# Patient Record
Sex: FEMALE | Race: WHITE | NOT HISPANIC OR LATINO | Employment: OTHER | URBAN - METROPOLITAN AREA
[De-identification: names, ages, dates, MRNs, and addresses within clinical notes are randomized per-mention and may not be internally consistent; named-entity substitution may affect disease eponyms.]

---

## 2017-11-22 ENCOUNTER — OFFICE VISIT (OUTPATIENT)
Dept: URGENT CARE | Facility: CLINIC | Age: 77
End: 2017-11-22
Payer: MEDICARE

## 2017-11-22 VITALS
RESPIRATION RATE: 19 BRPM | HEIGHT: 61 IN | OXYGEN SATURATION: 96 % | WEIGHT: 124 LBS | HEART RATE: 79 BPM | TEMPERATURE: 98 F | SYSTOLIC BLOOD PRESSURE: 148 MMHG | DIASTOLIC BLOOD PRESSURE: 92 MMHG | BODY MASS INDEX: 23.41 KG/M2

## 2017-11-22 DIAGNOSIS — B02.9 HERPES ZOSTER WITHOUT COMPLICATION: Primary | ICD-10-CM

## 2017-11-22 PROCEDURE — 99213 OFFICE O/P EST LOW 20 MIN: CPT | Mod: S$GLB,,, | Performed by: FAMILY MEDICINE

## 2017-11-22 RX ORDER — VALACYCLOVIR HYDROCHLORIDE 1 G/1
1000 TABLET, FILM COATED ORAL 3 TIMES DAILY
Qty: 21 TABLET | Refills: 0 | Status: SHIPPED | OUTPATIENT
Start: 2017-11-22 | End: 2018-05-01

## 2017-11-22 RX ORDER — HYDROCODONE BITARTRATE AND ACETAMINOPHEN 5; 325 MG/1; MG/1
1 TABLET ORAL 3 TIMES DAILY PRN
Qty: 30 TABLET | Refills: 0 | Status: SHIPPED | OUTPATIENT
Start: 2017-11-22 | End: 2017-12-02

## 2017-11-23 NOTE — PROGRESS NOTES
"Subjective:       Patient ID: Gayathri Mack is a 77 y.o. female.    Vitals:  height is 5' 1" (1.549 m) and weight is 56.2 kg (124 lb). Her oral temperature is 97.5 °F (36.4 °C). Her blood pressure is 148/92 (abnormal) and her pulse is 79. Her respiration is 19 and oxygen saturation is 96%.     Chief Complaint: Rash (rash under her lt. breast that she noticed today)    Rash   This is a new problem. The current episode started today. The problem has been gradually worsening since onset. The affected locations include the chest. The rash is characterized by pain, redness, itchiness and burning. Associated symptoms include fatigue. Pertinent negatives include no fever, joint pain, shortness of breath or sore throat.     Review of Systems   Constitution: Positive for fatigue. Negative for chills and fever.   HENT: Negative for sore throat.    Respiratory: Negative for shortness of breath.    Skin: Positive for itching and rash.   Musculoskeletal: Negative for joint pain.       Objective:      Physical Exam   Constitutional: She appears well-developed and well-nourished. No distress.   HENT:   Right Ear: External ear normal.   Left Ear: External ear normal.   Mouth/Throat: Oropharynx is clear and moist. No oropharyngeal exudate.   Cardiovascular: Normal rate, regular rhythm and normal heart sounds.    Pulmonary/Chest: Effort normal and breath sounds normal. No respiratory distress. She has no wheezes.   Skin: Rash noted. Rash is vesicular.        Vitals reviewed.      Assessment:       1. Herpes zoster without complication        Plan:         Herpes zoster without complication    Other orders  -     valACYclovir (VALTREX) 1000 MG tablet; Take 1 tablet (1,000 mg total) by mouth 3 (three) times daily.  Dispense: 21 tablet; Refill: 0  -     hydrocodone-acetaminophen 5-325mg (NORCO) 5-325 mg per tablet; Take 1 tablet by mouth 3 (three) times daily as needed for Pain.  Dispense: 30 tablet; Refill: 0      "

## 2017-11-26 ENCOUNTER — TELEPHONE (OUTPATIENT)
Dept: URGENT CARE | Facility: CLINIC | Age: 77
End: 2017-11-26

## 2018-05-01 ENCOUNTER — OFFICE VISIT (OUTPATIENT)
Dept: URGENT CARE | Facility: CLINIC | Age: 78
End: 2018-05-01
Payer: MEDICARE

## 2018-05-01 VITALS
HEART RATE: 85 BPM | TEMPERATURE: 97 F | RESPIRATION RATE: 16 BRPM | BODY MASS INDEX: 23.41 KG/M2 | OXYGEN SATURATION: 97 % | DIASTOLIC BLOOD PRESSURE: 82 MMHG | WEIGHT: 124 LBS | SYSTOLIC BLOOD PRESSURE: 124 MMHG | HEIGHT: 61 IN

## 2018-05-01 DIAGNOSIS — J32.9 SINUSITIS, UNSPECIFIED CHRONICITY, UNSPECIFIED LOCATION: ICD-10-CM

## 2018-05-01 DIAGNOSIS — J06.9 UPPER RESPIRATORY TRACT INFECTION, UNSPECIFIED TYPE: Primary | ICD-10-CM

## 2018-05-01 PROCEDURE — 99201 PR OFFICE/OUTPT VISIT,NEW,LEVL I: CPT | Mod: S$GLB,,, | Performed by: NURSE PRACTITIONER

## 2018-05-01 RX ORDER — PREDNISONE 20 MG/1
TABLET ORAL
Qty: 10 TABLET | Refills: 0 | Status: SHIPPED | OUTPATIENT
Start: 2018-05-01 | End: 2020-09-20 | Stop reason: ALTCHOICE

## 2018-05-01 RX ORDER — PROMETHAZINE HYDROCHLORIDE 6.25 MG/5ML
6.25 SYRUP ORAL
Qty: 120 ML | Refills: 1 | Status: SHIPPED | OUTPATIENT
Start: 2018-05-01 | End: 2019-05-01

## 2018-05-01 RX ORDER — AZITHROMYCIN 250 MG/1
TABLET, FILM COATED ORAL
Qty: 6 TABLET | Refills: 0 | Status: SHIPPED | OUTPATIENT
Start: 2018-05-01 | End: 2020-09-20 | Stop reason: ALTCHOICE

## 2018-05-01 RX ORDER — ALBUTEROL SULFATE 90 UG/1
2 AEROSOL, METERED RESPIRATORY (INHALATION) EVERY 6 HOURS PRN
Qty: 8 G | Refills: 0 | Status: SHIPPED | OUTPATIENT
Start: 2018-05-01 | End: 2020-09-20

## 2018-05-01 NOTE — PROGRESS NOTES
"Subjective:       Patient ID: Gayathri Mack is a 77 y.o. female.    Vitals:  height is 5' 1" (1.549 m) and weight is 56.2 kg (124 lb). Her oral temperature is 97.1 °F (36.2 °C). Her blood pressure is 124/82 and her pulse is 85. Her respiration is 16 and oxygen saturation is 97%.     Chief Complaint: Cough    Started 3 days, has had sinus prob every 1-2 years.   States she has yellow productive cough    Going out of town Friday.   Smoker for years       Cough   This is a new problem. The current episode started in the past 7 days. The problem has been gradually worsening. The cough is productive of sputum. Associated symptoms include chills, headaches, nasal congestion and postnasal drip. Pertinent negatives include no chest pain, ear pain, eye redness, fever, myalgias, sore throat, shortness of breath or wheezing. Nothing aggravates the symptoms. She has tried nothing for the symptoms. The treatment provided no relief. There is no history of asthma, COPD or pneumonia.     Review of Systems   Constitution: Positive for chills. Negative for fever and malaise/fatigue.   HENT: Positive for postnasal drip. Negative for congestion, ear pain, hoarse voice and sore throat.    Eyes: Negative for discharge and redness.   Cardiovascular: Negative for chest pain, dyspnea on exertion and leg swelling.   Respiratory: Positive for cough and sputum production. Negative for shortness of breath and wheezing.    Musculoskeletal: Negative for myalgias.   Gastrointestinal: Negative for abdominal pain and nausea.   Neurological: Positive for headaches.       Objective:      Physical Exam   Constitutional: She is oriented to person, place, and time. She appears well-developed and well-nourished. She is cooperative.  Non-toxic appearance. She does not appear ill. No distress.   HENT:   Head: Normocephalic and atraumatic.   Right Ear: Tympanic membrane, external ear and ear canal normal.   Left Ear: Tympanic membrane, external ear and ear " canal normal.   Nose: No mucosal edema, rhinorrhea or nasal deformity. No epistaxis. Right sinus exhibits frontal sinus tenderness. Right sinus exhibits no maxillary sinus tenderness. Left sinus exhibits frontal sinus tenderness. Left sinus exhibits no maxillary sinus tenderness.   Mouth/Throat: Uvula is midline, oropharynx is clear and moist and mucous membranes are normal. Normal dentition. No uvula swelling. No posterior oropharyngeal erythema.   Eyes: Conjunctivae and lids are normal. No scleral icterus.   Sclera clear bilat   Neck: Trachea normal, full passive range of motion without pain and phonation normal. Neck supple.   Cardiovascular: Normal rate, regular rhythm, normal heart sounds, intact distal pulses and normal pulses.    Pulmonary/Chest: Effort normal and breath sounds normal. No respiratory distress.   Abdominal: Normal appearance.   Musculoskeletal: Normal range of motion. She exhibits no edema or deformity.   Neurological: She is alert and oriented to person, place, and time. She exhibits normal muscle tone. Coordination normal.   Skin: Skin is warm, dry and intact. She is not diaphoretic. No pallor.   Psychiatric: She has a normal mood and affect. Her speech is normal and behavior is normal. Judgment and thought content normal. Cognition and memory are normal.   Nursing note and vitals reviewed.      Assessment:       1. Upper respiratory tract infection, unspecified type    2. Sinusitis, unspecified chronicity, unspecified location        Plan:         Upper respiratory tract infection, unspecified type    Sinusitis, unspecified chronicity, unspecified location    Other orders  -     albuterol 90 mcg/actuation inhaler; Inhale 2 puffs into the lungs every 6 (six) hours as needed for Wheezing. Rescue  Dispense: 8 g; Refill: 0  -     predniSONE (DELTASONE) 20 MG tablet; Take 40 mg for 2 days, Take 30 mg for 2 days, Take 20 mg for 2 days, Take 10 mg for 2 days  Dispense: 10 tablet; Refill: 0  -      azithromycin (ZITHROMAX Z-XENA) 250 MG tablet; Take 2 tablets (500 mg) on  Day 1,  followed by 1 tablet (250 mg) once daily on Days 2 through 5.  Dispense: 6 tablet; Refill: 0  -     promethazine (PHENERGAN) 6.25 mg/5 mL syrup; Take 5 mLs (6.25 mg total) by mouth every 4 to 6 hours as needed for Nausea (cough).  Dispense: 120 mL; Refill: 1    discussed home relief

## 2018-05-01 NOTE — PATIENT INSTRUCTIONS
Symptomatic treatment: (consider the following unless you are allergic or cannot take the following suggestions)  Alternate Tylenol and Ibuprofen every 3 hrs for pain and fever control   For a sore throat try salt water gargles, honey/lemon water to soothe throat  Cepachol helps to numb the discomfort in throat  Cold-eeze helps to reduce the duration of Upper Respiratory Infection symptoms  Elderberry to reduce duration of URI symptoms  Nasal saline spray reduces inflammation and dryness  Warm face compresses/hot showers as often as you can to open sinuses   Vicks vapor rub at night  Flonase OTC or Nasacort OTC (nasal steroid)  Stay hydrated with increased water intake and simple foods like chicken noodle soup help hydrate and soothe the throat  Drink pedialyte, gatorade or propel.   Delsym helps with coughing at night  Zyrtec or Claritin during the day   Benadryl at night may help if allergy component   You can try breathe right strips at night to help you breathe   A cool mist humidifier in bedroom may help with cough and relieve stuffy nose.   Zantac will help if there is reflux from the post nasal drip         If you experience any:  Chest pain, shortness of breath, wheezing or difficulty breathing  Severe headache, face, neck or ear pain  New rash  Fever over 101.5º F (38.6 C) for more than three days  Confusion, behavior change or seizure  Severe weakness or dizziness  Go to ER

## 2018-05-04 ENCOUNTER — TELEPHONE (OUTPATIENT)
Dept: URGENT CARE | Facility: CLINIC | Age: 78
End: 2018-05-04

## 2018-12-20 ENCOUNTER — OFFICE VISIT (OUTPATIENT)
Dept: URGENT CARE | Facility: CLINIC | Age: 78
End: 2018-12-20
Payer: MEDICARE

## 2018-12-20 VITALS
SYSTOLIC BLOOD PRESSURE: 129 MMHG | TEMPERATURE: 98 F | OXYGEN SATURATION: 96 % | BODY MASS INDEX: 23.41 KG/M2 | DIASTOLIC BLOOD PRESSURE: 90 MMHG | HEART RATE: 103 BPM | WEIGHT: 124 LBS | HEIGHT: 61 IN

## 2018-12-20 DIAGNOSIS — J06.9 ACUTE URI: Primary | ICD-10-CM

## 2018-12-20 PROCEDURE — 99213 OFFICE O/P EST LOW 20 MIN: CPT | Mod: S$GLB,,, | Performed by: FAMILY MEDICINE

## 2018-12-20 RX ORDER — AZITHROMYCIN 250 MG/1
250 TABLET, FILM COATED ORAL DAILY
Qty: 6 TABLET | Refills: 0 | Status: SHIPPED | OUTPATIENT
Start: 2018-12-20 | End: 2018-12-26

## 2018-12-20 NOTE — PATIENT INSTRUCTIONS
Please return here or go to the Emergency Department for any concerns or worsening of condition.  If you were prescribed antibiotics, please take them to completion.  If you were prescribed a narcotic medication, do not drive or operate heavy equipment or machinery while taking these medications.  Please follow up with your primary care doctor or specialist as needed.  If you  smoke, please stop smoking.  Symptomatic treatment:    Tylenol every 4 hours  Ibuprofen ever 6 hours  salt water gargles  Cepachol helps to numb the discomfort  Chloroseptic spray  Nasal saline spray reduces inflammation and dryness  Warm face compresses as often as you can  Vicks vapor rub at night  Flonase OTC or Nasacort OTC  Simple foods like chicken noodle soup help  Delsym helps with coughing at night  Zyrtec/Claritin during the day   Rest as much as you can    Mucinex DM for cough

## 2018-12-20 NOTE — PROGRESS NOTES
"Subjective:       Patient ID: Gayathri Mack is a 78 y.o. female.    Vitals:  height is 5' 1" (1.549 m) and weight is 56.2 kg (124 lb). Her oral temperature is 98.2 °F (36.8 °C). Her blood pressure is 129/90 (abnormal) and her pulse is 103. Her oxygen saturation is 96%.     Chief Complaint: URI    x5 days, No OTC medications today.      URI    This is a new problem. The current episode started in the past 7 days. The problem has been gradually worsening. There has been no fever. Associated symptoms include congestion, coughing, sneezing and wheezing. Pertinent negatives include no ear pain, nausea, rash, sinus pain, sore throat or vomiting. She has tried nothing for the symptoms. The treatment provided no relief.       Constitution: Positive for chills. Negative for sweating, fatigue and fever.   HENT: Positive for congestion. Negative for ear pain, sinus pain, sinus pressure, sore throat and voice change.    Neck: Negative for painful lymph nodes.   Eyes: Negative for eye redness.   Respiratory: Positive for cough, sputum production and wheezing. Negative for chest tightness, bloody sputum, COPD, shortness of breath, stridor and asthma.    Gastrointestinal: Negative for nausea and vomiting.   Musculoskeletal: Negative for muscle ache.   Skin: Negative for rash.   Allergic/Immunologic: Positive for sneezing. Negative for seasonal allergies and asthma.   Hematologic/Lymphatic: Negative for swollen lymph nodes.       Objective:      Physical Exam   Constitutional: She appears well-developed and well-nourished.   HENT:   Head: Normocephalic and atraumatic.   Right Ear: External ear normal.   Left Ear: External ear normal.   Nose: Nose normal.   Mouth/Throat: Oropharynx is clear and moist. No oropharyngeal exudate.   Eyes: Conjunctivae are normal. Right eye exhibits no discharge. Left eye exhibits no discharge.   Cardiovascular: Normal rate, regular rhythm and normal heart sounds.   Pulmonary/Chest: Effort normal and " breath sounds normal. She has no wheezes.   Skin: Skin is warm and dry.   Psychiatric: She has a normal mood and affect. Her behavior is normal.   Vitals reviewed.      Assessment:       1. Acute URI        Plan:         Acute URI    Other orders  -     azithromycin (ZITHROMAX Z-XENA) 250 MG tablet; Take 1 tablet (250 mg total) by mouth once daily. TWO TABS DAY ONE, ONE TAB DAYS TWO-FIVE for 6 days  Dispense: 6 tablet; Refill: 0    mucinex dm for cough

## 2018-12-23 ENCOUNTER — TELEPHONE (OUTPATIENT)
Dept: URGENT CARE | Facility: CLINIC | Age: 78
End: 2018-12-23

## 2019-04-25 ENCOUNTER — OFFICE VISIT (OUTPATIENT)
Dept: URGENT CARE | Facility: CLINIC | Age: 79
End: 2019-04-25
Payer: MEDICARE

## 2019-04-25 VITALS
OXYGEN SATURATION: 96 % | BODY MASS INDEX: 23.41 KG/M2 | HEART RATE: 88 BPM | SYSTOLIC BLOOD PRESSURE: 150 MMHG | TEMPERATURE: 97 F | DIASTOLIC BLOOD PRESSURE: 91 MMHG | WEIGHT: 124 LBS | RESPIRATION RATE: 16 BRPM | HEIGHT: 61 IN

## 2019-04-25 DIAGNOSIS — Z20.89 EXPOSURE TO PNEUMONIA: Primary | ICD-10-CM

## 2019-04-25 DIAGNOSIS — R06.2 WHEEZING: ICD-10-CM

## 2019-04-25 PROCEDURE — 99214 PR OFFICE/OUTPT VISIT, EST, LEVL IV, 30-39 MIN: ICD-10-PCS | Mod: S$GLB,,, | Performed by: FAMILY MEDICINE

## 2019-04-25 PROCEDURE — 99214 OFFICE O/P EST MOD 30 MIN: CPT | Mod: S$GLB,,, | Performed by: FAMILY MEDICINE

## 2019-04-25 RX ORDER — PREDNISONE 20 MG/1
20 TABLET ORAL DAILY
Qty: 5 TABLET | Refills: 0 | Status: SHIPPED | OUTPATIENT
Start: 2019-04-25 | End: 2019-04-30

## 2019-04-25 RX ORDER — ALBUTEROL SULFATE 90 UG/1
2 AEROSOL, METERED RESPIRATORY (INHALATION) EVERY 6 HOURS PRN
Qty: 1 INHALER | Refills: 2 | Status: SHIPPED | OUTPATIENT
Start: 2019-04-25 | End: 2020-09-20

## 2019-04-25 RX ORDER — AZITHROMYCIN 250 MG/1
TABLET, FILM COATED ORAL
Qty: 6 TABLET | Refills: 0 | Status: SHIPPED | OUTPATIENT
Start: 2019-04-25 | End: 2020-09-20 | Stop reason: ALTCHOICE

## 2019-04-25 RX ORDER — BENZONATATE 100 MG/1
100 CAPSULE ORAL EVERY 6 HOURS PRN
Qty: 30 CAPSULE | Refills: 1 | Status: SHIPPED | OUTPATIENT
Start: 2019-04-25 | End: 2020-04-24

## 2019-04-25 NOTE — PROGRESS NOTES
"Subjective:       Patient ID: Gayathri Mack is a 78 y.o. female.    Vitals:  height is 5' 1" (1.549 m) and weight is 56.2 kg (124 lb). Her oral temperature is 97.1 °F (36.2 °C). Her blood pressure is 150/91 (abnormal) and her pulse is 88. Her respiration is 16 and oxygen saturation is 96%.     Chief Complaint: Cough    Pt states she started with sore throat, but it is resolved now, then she started with cough, headaches on Tuesday. Pt also states she was expose to  pneumonia last Friday from daughter.     Cough   This is a new problem. The current episode started in the past 7 days. The problem has been gradually worsening. The problem occurs constantly. Associated symptoms include headaches, nasal congestion and wheezing. Pertinent negatives include no chills, ear pain, eye redness, fever, hemoptysis, myalgias, rash, sore throat or shortness of breath. Nothing aggravates the symptoms. Treatments tried: nyquil capsules. The treatment provided no relief.       Constitution: Positive for fatigue. Negative for chills, sweating and fever.   HENT: Positive for congestion and sinus pressure. Negative for ear pain, sinus pain, sore throat and voice change.    Neck: Negative for painful lymph nodes.   Eyes: Negative for eye redness.   Respiratory: Positive for chest tightness, cough and wheezing. Negative for sputum production, bloody sputum, COPD, shortness of breath, stridor and asthma.    Gastrointestinal: Negative for nausea and vomiting.   Musculoskeletal: Negative for muscle ache.   Skin: Negative for rash.   Allergic/Immunologic: Negative for seasonal allergies and asthma.   Neurological: Positive for headaches.   Hematologic/Lymphatic: Negative for swollen lymph nodes.       Objective:      Physical Exam   Constitutional: She is oriented to person, place, and time. She appears well-developed and well-nourished. She is cooperative.  Non-toxic appearance. She appears ill. No distress.   HENT:   Head: Normocephalic and " atraumatic.   Right Ear: Hearing, tympanic membrane, external ear and ear canal normal.   Left Ear: Hearing, tympanic membrane, external ear and ear canal normal.   Nose: Mucosal edema present. No rhinorrhea or nasal deformity. No epistaxis. Right sinus exhibits no maxillary sinus tenderness and no frontal sinus tenderness. Left sinus exhibits no maxillary sinus tenderness and no frontal sinus tenderness.   Mouth/Throat: Uvula is midline, oropharynx is clear and moist and mucous membranes are normal. No trismus in the jaw. Normal dentition. No uvula swelling. No posterior oropharyngeal erythema.   Eyes: Conjunctivae and lids are normal. No scleral icterus.   Sclera clear bilat   Neck: Trachea normal, full passive range of motion without pain and phonation normal. Neck supple.   Cardiovascular: Normal rate, regular rhythm, normal heart sounds, intact distal pulses and normal pulses.   Pulmonary/Chest: Effort normal. No respiratory distress. She has wheezes.   Abdominal: Soft. Normal appearance and bowel sounds are normal. She exhibits no distension. There is no tenderness.   Musculoskeletal: Normal range of motion. She exhibits no edema or deformity.   Neurological: She is alert and oriented to person, place, and time. She exhibits normal muscle tone. Coordination normal.   Skin: Skin is warm, dry and intact. She is not diaphoretic. No pallor.   Psychiatric: She has a normal mood and affect. Her speech is normal and behavior is normal. Judgment and thought content normal. Cognition and memory are normal.   Nursing note and vitals reviewed.      Assessment:       1. Exposure to pneumonia    2. Wheezing        Plan:         Exposure to pneumonia    Wheezing    Other orders  -     azithromycin (ZITHROMAX) 250 MG tablet; Take 2 pills on day one, then one pill each day until completed  Dispense: 6 tablet; Refill: 0  -     albuterol (PROVENTIL/VENTOLIN HFA) 90 mcg/actuation inhaler; Inhale 2 puffs into the lungs every 6  (six) hours as needed for Wheezing. Rescue  Dispense: 1 Inhaler; Refill: 2  -     benzonatate (TESSALON PERLES) 100 MG capsule; Take 1 capsule (100 mg total) by mouth every 6 (six) hours as needed for Cough.  Dispense: 30 capsule; Refill: 1  -     predniSONE (DELTASONE) 20 MG tablet; Take 1 tablet (20 mg total) by mouth once daily. for 5 days  Dispense: 5 tablet; Refill: 0        Pocket script given to patient in case symptoms fail to improve or worsen. Pt advised on risk of taking medication too soon and verbalized understanding.    Explained r/b and patient v/u to fill only if symptoms progress or worsen after maximizing home remedies sheet given and/or explained during encounter.

## 2019-04-28 ENCOUNTER — TELEPHONE (OUTPATIENT)
Dept: URGENT CARE | Facility: CLINIC | Age: 79
End: 2019-04-28

## 2020-09-20 ENCOUNTER — OFFICE VISIT (OUTPATIENT)
Dept: URGENT CARE | Facility: CLINIC | Age: 80
End: 2020-09-20
Payer: MEDICARE

## 2020-09-20 VITALS
TEMPERATURE: 98 F | HEIGHT: 61 IN | HEART RATE: 70 BPM | DIASTOLIC BLOOD PRESSURE: 87 MMHG | WEIGHT: 123.88 LBS | BODY MASS INDEX: 23.39 KG/M2 | OXYGEN SATURATION: 94 % | SYSTOLIC BLOOD PRESSURE: 140 MMHG | RESPIRATION RATE: 18 BRPM

## 2020-09-20 DIAGNOSIS — Z87.09 HISTORY OF COPD: ICD-10-CM

## 2020-09-20 DIAGNOSIS — J06.9 ACUTE URI: ICD-10-CM

## 2020-09-20 DIAGNOSIS — R05.9 COUGH: Primary | ICD-10-CM

## 2020-09-20 DIAGNOSIS — R06.2 WHEEZING: ICD-10-CM

## 2020-09-20 LAB
CTP QC/QA: YES
SARS-COV-2 RDRP RESP QL NAA+PROBE: NEGATIVE

## 2020-09-20 PROCEDURE — 71046 X-RAY EXAM CHEST 2 VIEWS: CPT | Mod: S$GLB,,, | Performed by: RADIOLOGY

## 2020-09-20 PROCEDURE — 99214 PR OFFICE/OUTPT VISIT, EST, LEVL IV, 30-39 MIN: ICD-10-PCS | Mod: S$GLB,,, | Performed by: PHYSICIAN ASSISTANT

## 2020-09-20 PROCEDURE — 71046 XR CHEST PA AND LATERAL: ICD-10-PCS | Mod: S$GLB,,, | Performed by: RADIOLOGY

## 2020-09-20 PROCEDURE — U0002: ICD-10-PCS | Mod: QW,S$GLB,, | Performed by: PHYSICIAN ASSISTANT

## 2020-09-20 PROCEDURE — U0002 COVID-19 LAB TEST NON-CDC: HCPCS | Mod: QW,S$GLB,, | Performed by: PHYSICIAN ASSISTANT

## 2020-09-20 PROCEDURE — 99214 OFFICE O/P EST MOD 30 MIN: CPT | Mod: S$GLB,,, | Performed by: PHYSICIAN ASSISTANT

## 2020-09-20 RX ORDER — LISINOPRIL 20 MG/1
TABLET ORAL
COMMUNITY
Start: 2020-06-26 | End: 2021-06-14 | Stop reason: SDUPTHER

## 2020-09-20 RX ORDER — ALBUTEROL SULFATE 90 UG/1
2 AEROSOL, METERED RESPIRATORY (INHALATION) EVERY 6 HOURS PRN
Qty: 18 G | Refills: 3 | Status: SHIPPED | OUTPATIENT
Start: 2020-09-20 | End: 2021-02-08

## 2020-09-20 RX ORDER — PREDNISONE 10 MG/1
TABLET ORAL
Qty: 18 TABLET | Refills: 0 | Status: SHIPPED | OUTPATIENT
Start: 2020-09-20 | End: 2021-06-14

## 2020-09-20 RX ORDER — IPRATROPIUM BROMIDE AND ALBUTEROL SULFATE 2.5; .5 MG/3ML; MG/3ML
3 SOLUTION RESPIRATORY (INHALATION) EVERY 6 HOURS PRN
Qty: 25 VIAL | Refills: 2 | Status: SHIPPED | OUTPATIENT
Start: 2020-09-20 | End: 2020-12-15 | Stop reason: SDUPTHER

## 2020-09-20 RX ORDER — ALENDRONATE SODIUM 70 MG/1
70 TABLET ORAL
COMMUNITY
Start: 2020-06-15 | End: 2021-06-14 | Stop reason: SDUPTHER

## 2020-09-20 RX ORDER — CITALOPRAM 20 MG/1
20 TABLET, FILM COATED ORAL DAILY
COMMUNITY
End: 2021-06-14

## 2020-09-20 NOTE — PROGRESS NOTES
"Subjective:       Patient ID: Gayathri Mack is a 80 y.o. female.    Vitals:  height is 5' 1" (1.549 m) and weight is 56.2 kg (123 lb 14.4 oz). Her temperature is 97.5 °F (36.4 °C). Her blood pressure is 140/87 (abnormal) and her pulse is 70. Her respiration is 18 and oxygen saturation is 94% (abnormal).     Chief Complaint: COVID-19 Concerns    Pt presents to the clinic for COVID testing. Pt c/o fatigue, wheezing and dry cough, x 4 weeks. Pt is taking breathing treatments 2x day.     Cough  This is a new problem. The current episode started 1 to 4 weeks ago. The problem has been unchanged. The cough is non-productive (sometimes mucus). Associated symptoms include shortness of breath and wheezing. Pertinent negatives include no chills, ear pain, eye redness, fever, headaches, hemoptysis, myalgias, nasal congestion, postnasal drip, rash or sore throat. Nothing aggravates the symptoms. She has tried ipratropium inhaler for the symptoms. The treatment provided no relief.       Constitution: Negative for chills, sweating, fatigue and fever.   HENT: Positive for sinus pressure. Negative for ear pain, congestion, postnasal drip, sinus pain, sore throat and voice change.    Neck: Negative for painful lymph nodes.   Eyes: Negative for eye redness.   Respiratory: Positive for cough, shortness of breath and wheezing. Negative for chest tightness, sputum production, bloody sputum, COPD, stridor and asthma.    Gastrointestinal: Negative for nausea, vomiting and diarrhea.   Musculoskeletal: Negative for muscle ache.   Skin: Negative for rash.   Allergic/Immunologic: Negative for seasonal allergies and asthma.   Neurological: Negative for headaches.   Hematologic/Lymphatic: Negative for swollen lymph nodes.       Objective:      Physical Exam   Constitutional: She is oriented to person, place, and time. She appears well-developed. She is cooperative.  Non-toxic appearance. She does not appear ill. No distress.   HENT:   Head: " Normocephalic and atraumatic.   Ears:   Right Ear: Hearing, tympanic membrane, external ear and ear canal normal.   Left Ear: Hearing, tympanic membrane, external ear and ear canal normal.   Nose: Nose normal. No mucosal edema, rhinorrhea or nasal deformity. No epistaxis. Right sinus exhibits no maxillary sinus tenderness and no frontal sinus tenderness. Left sinus exhibits no maxillary sinus tenderness and no frontal sinus tenderness.   Mouth/Throat: Uvula is midline, oropharynx is clear and moist and mucous membranes are normal. No trismus in the jaw. Normal dentition. No uvula swelling. No oropharyngeal exudate, posterior oropharyngeal edema or posterior oropharyngeal erythema.   Eyes: Conjunctivae and lids are normal. No scleral icterus.   Neck: Trachea normal, full passive range of motion without pain and phonation normal. Neck supple. No neck rigidity. No edema and no erythema present.   Cardiovascular: Normal rate, regular rhythm, normal heart sounds and normal pulses.   Pulmonary/Chest: Effort normal. No respiratory distress. She has no decreased breath sounds. She has wheezes in the right upper field, the right middle field, the left upper field and the left middle field. She has no rhonchi.   Abdominal: Normal appearance.   Musculoskeletal: Normal range of motion.         General: No deformity.   Neurological: She is alert and oriented to person, place, and time. She exhibits normal muscle tone. Coordination normal.   Skin: Skin is warm, dry, intact, not diaphoretic and not pale. Psychiatric: Her speech is normal and behavior is normal. Judgment and thought content normal.   Nursing note and vitals reviewed.        Assessment:       1. Cough    2. Wheezing    3. Acute URI    4. History of COPD        Plan:         Cough  -     POCT COVID-19 Rapid Screening  -     XR CHEST PA AND LATERAL; Future; Expected date: 09/20/2020  -     albuterol-ipratropium (DUO-NEB) 2.5 mg-0.5 mg/3 mL nebulizer solution; Take 3  mLs by nebulization every 6 (six) hours as needed for Wheezing. Rescue  Dispense: 25 vial; Refill: 2  -     predniSONE (DELTASONE) 10 MG tablet; Take two pills po x 5 days, 1 pill po x 5 days, 1/2 pill po until empty. Start 24 hours after injection.  Dispense: 18 tablet; Refill: 0  -     albuterol (PROVENTIL/VENTOLIN HFA) 90 mcg/actuation inhaler; Inhale 2 puffs into the lungs every 6 (six) hours as needed for Wheezing. Rescue  Dispense: 18 g; Refill: 3    Wheezing  -     XR CHEST PA AND LATERAL; Future; Expected date: 09/20/2020  -     albuterol-ipratropium (DUO-NEB) 2.5 mg-0.5 mg/3 mL nebulizer solution; Take 3 mLs by nebulization every 6 (six) hours as needed for Wheezing. Rescue  Dispense: 25 vial; Refill: 2  -     predniSONE (DELTASONE) 10 MG tablet; Take two pills po x 5 days, 1 pill po x 5 days, 1/2 pill po until empty. Start 24 hours after injection.  Dispense: 18 tablet; Refill: 0  -     albuterol (PROVENTIL/VENTOLIN HFA) 90 mcg/actuation inhaler; Inhale 2 puffs into the lungs every 6 (six) hours as needed for Wheezing. Rescue  Dispense: 18 g; Refill: 3    Acute URI  -     XR CHEST PA AND LATERAL; Future; Expected date: 09/20/2020  -     albuterol-ipratropium (DUO-NEB) 2.5 mg-0.5 mg/3 mL nebulizer solution; Take 3 mLs by nebulization every 6 (six) hours as needed for Wheezing. Rescue  Dispense: 25 vial; Refill: 2  -     predniSONE (DELTASONE) 10 MG tablet; Take two pills po x 5 days, 1 pill po x 5 days, 1/2 pill po until empty. Start 24 hours after injection.  Dispense: 18 tablet; Refill: 0  -     albuterol (PROVENTIL/VENTOLIN HFA) 90 mcg/actuation inhaler; Inhale 2 puffs into the lungs every 6 (six) hours as needed for Wheezing. Rescue  Dispense: 18 g; Refill: 3    History of COPD  -     XR CHEST PA AND LATERAL; Future; Expected date: 09/20/2020  -     albuterol-ipratropium (DUO-NEB) 2.5 mg-0.5 mg/3 mL nebulizer solution; Take 3 mLs by nebulization every 6 (six) hours as needed for Wheezing. Rescue   Dispense: 25 vial; Refill: 2  -     predniSONE (DELTASONE) 10 MG tablet; Take two pills po x 5 days, 1 pill po x 5 days, 1/2 pill po until empty. Start 24 hours after injection.  Dispense: 18 tablet; Refill: 0  -     albuterol (PROVENTIL/VENTOLIN HFA) 90 mcg/actuation inhaler; Inhale 2 puffs into the lungs every 6 (six) hours as needed for Wheezing. Rescue  Dispense: 18 g; Refill: 3    All applicable EKG, medical records, labs, imaging reviewed in Epic and discussed with patient.   Xr Chest Pa And Lateral    Result Date: 9/20/2020  EXAMINATION: XR CHEST PA AND LATERAL CLINICAL HISTORY: Cough TECHNIQUE: PA and lateral views of the chest were performed. COMPARISON: None FINDINGS: The lungs are clear, with normal appearance of pulmonary vasculature and no pleural effusion or pneumothorax. The cardiac silhouette is normal in size. The hilar and mediastinal contours are unremarkable. Bones are intact.     No acute abnormality. Electronically signed by: Jumana Galvan MD Date:    09/20/2020 Time:    14:23   Results for orders placed or performed in visit on 09/20/20   POCT COVID-19 Rapid Screening   Result Value Ref Range    POC Rapid COVID Negative Negative     Acceptable Yes       Patient Instructions       COPD Flare    You have had a flare-up of your COPD.  COPD, or chronic obstructive pulmonary disease, is a common lung disease. It causes your airways to become irritated and narrower. This makes it harder for you to breathe. Emphysema and chronic bronchitis are both types of COPD. This is a chronic condition, which means you always have it. Sometimes it gets worse. When this happens, it is called a flare-up.  Symptoms of COPD  People with COPD may have symptoms most of the time. In a flare-up, your symptoms get worse. These symptoms may mean you are having a flare-up:  · Shortness of breath, shallow or rapid breathing, or wheezing that gets worse  · Lung infection  · Cough that gets worse  · More  mucus, thicker mucus or mucus of a different color  · Tiredness, decreased energy, or trouble doing your usual activities  · Fever  · Chest tightness  · Your symptoms dont get better even when you use your usual medicines, inhalers, and nebulizer  · Trouble talking  · You feel confused  Causes of flare-ups  Unfortunately, a flare-up can happen even though you did everything right, and you followed your doctors instructions. Some causes of flare-ups are:  · Smoking or secondhand smoke  · Colds, the flu, or respiratory infections  · Air pollution  · Sudden change in the weather  · Dust, irritating chemicals, or strong fumes  · Not taking your medicines as prescribed  Home care  Here are some things you can do at home to treat a flare-up:  · Try not to panic. This makes it harder to breathe, and keeps you from doing the right things.  · Dont smoke or be around others who are smoking.  · Try to drink more fluids than usual during a flare-up, unless your doctor has told you not to because of heart and kidney problems. More fluids can help loosen the mucus.  · Use your inhalers and nebulizer, if you have one, as you have been told to.  · If you were given antibiotics, take them until they are used up or your doctor tells you to stop. Its important to finish the antibiotics, even though you feel better. This will make sure the infection has cleared.  · If you were given prednisone or another steroid, finish it even if you feel better.  Preventing a flare-up  Even though flare-ups happen, the best way to treat one is to prevent it before it starts. Here are some pointers:  · Dont smoke or be around others who are smoking.  · Take your medicines as you have been told.  · Talk with your doctor about getting a flu shot every year. Also find out if you need a pneumonia shot.  · If there is a weather advisory warning to stay indoors, try to stay inside when possible.  · Try to eat healthy and get plenty of sleep.  · Try to  avoid things that usually set you off, like dust, chemical fumes, hairsprays, or strong perfumes.  Follow-up care  Follow up with your healthcare provider, or as advised.  If a culture was done, you will be told if your treatment needs to be changed. You can call as directed for the results.  If X-rays were done, you will be notified of any new findings that may affect your care.  Call 911  Call 911 if any of these occur:  · You have trouble breathing  · You feel confused or its difficult to wake you up  · You faint or lose consciousness  · You have a rapid heart rate  · You have new pain in your chest, arm, shoulder, neck or upper back  When to seek medical advice  Call your healthcare provider right away if any of these occur:  · Wheezing or shortness of breath gets worse  · You need to use your inhalers more often than usual without relief  · Fever of 100.4°F (38ºC) or higher, or as directed by your healthcare provider  · Coughing up lots of dark-colored or bloody mucus (sputum)  · Chest pain with each breath  · You do not start to get better within 24 hours  · Swelling of your ankles gets worse  · Dizziness or weakness  Date Last Reviewed: 9/1/2016  © 1357-1702 [employer] [address] [place] [address], [place] [address]. All rights reserved. This information is not intended as a substitute for professional medical care. Always follow your healthcare professional's instructions.      Using a Nebulizer (Adult)    A nebulizer turns medicine into a mist. You breathe the mist in through a mask or a mouthpiece. To use your nebulizer, follow the steps below.  With a mask  · Put the correct dose of medicine in the cup. Attach the top as directed.  · Connect one end of the tubing to the cup and the other end to the nebulizer.  · Attach the mask to the cup.  · Place the mask over your nose and mouth. Make sure it fits securely and comfortably.  · Turn on the nebulizer.  · Take slow, deep breaths, keeping the  nebulizer upright, until all the medicine is gone. This takes 10-15 minutes.  Be sure to follow directions you are given for cleaning the nebulizer and the mask.   With a mouthpiece    · Put the correct dose of medicine in the cup. Attach the top as directed.  · Connect one end of the tubing to the cup and the other end to the nebulizer.  · Attach the mouthpiece to the cup.  · Put the mouthpiece between your teeth and close your lips around it. Keep your tongue below the mouthpiece.  · Turn on the nebulizer.  · Take slow, deep breaths through the mouthpiece, keeping the nebulizer upright, until all the medicine is gone. This takes 10-15 minutes.  Be sure to follow directions you are given for cleaning the nebulizer and the mouthpiece.   Date Last Reviewed: 10/1/2016  © 5591-3540 [employer] [address] [place] [address]. All rights reserved. This information is not intended as a substitute for professional medical care. Always follow your healthcare professional's instructions.    Thank you for enrolling in [facility]. Please follow the instructions below to securely access your online medical record. [facility] allows you to send messages to your doctor, view your test results, renew your prescriptions, schedule appointments, and more.     How Do I Sign Up?  1. In your Internet browser, go to [email or web address].  2. In the lower right of the page, click the Activate Now link located under the Have Access Code? Title.  3. Enter your [facility] Access Code exactly as it appears below. You will not need to use this code after youve completed the sign-up process. If you do not sign up before the expiration date, you must request a new code.  [facility] Access Code: Activation code not generated  Current Patient Portal Status: Account disabled    4. Enter Date of Birth (mm/dd/yyyy) as indicated and click the Next button. You will be taken to the next sign-up page.  5. Create a [facility] ID. This will  be your new [facility] login ID and cannot be changed, so think of one that is secure and easy to remember.  6. Create a [facility] password.  Your password must be at least 8 characters long and contain at least 1 letter and 1 number.  You can change your password at any time.  7. Enter your Password Reset Question and Answer, then click the Next button.   8. Enter your e-mail address. You will receive e-mail notification when new information is available in [facility].  9. Click Sign Up. You can now view your medical record.     Additional Information  If you have questions, you can email [email or web address] or call [phone]  to talk to our [facility] staff. Remember, [facility] is NOT to be used for urgent needs. For medical emergencies, dial 911.     If not allergic,take tylenol (acetominophen) for fever control, chills, or body aches every 4 hours. Do not exceed 4000 mg/ day.If not allergic, take Motrin (Ibuprofen) every 4 hours for fever, chills, pain or inflammation. Do not exceed 2400 mg/day. You can alternate taking tylenol and motrin.    If you were prescribed a narcotic medication, do not drive or operate heavy equipment or machinery while taking these medications.  You must understand that you've received an Urgent Care treatment only and that you may be released before all your medical problems are known or treated. You, the patient, will arrange for follow up care as instructed.  Follow up with your PCP or specialty clinic as directed in the next 1-2 weeks if not improved or as needed.  You can call [phone] to schedule an appointment with the appropriate provider.  If your condition worsens we recommend that you receive another evaluation at the emergency room immediately or contact your primary medical clinics after hours call service to discuss your concerns.    Please return here or go to the Emergency Department for any concerns or worsening of condition.    If you have been referred to  another provider and wish to call to check on the status of your referral, please call [facility] at [phone]

## 2020-09-20 NOTE — PATIENT INSTRUCTIONS
COPD Flare    You have had a flare-up of your COPD.  COPD, or chronic obstructive pulmonary disease, is a common lung disease. It causes your airways to become irritated and narrower. This makes it harder for you to breathe. Emphysema and chronic bronchitis are both types of COPD. This is a chronic condition, which means you always have it. Sometimes it gets worse. When this happens, it is called a flare-up.  Symptoms of COPD  People with COPD may have symptoms most of the time. In a flare-up, your symptoms get worse. These symptoms may mean you are having a flare-up:  · Shortness of breath, shallow or rapid breathing, or wheezing that gets worse  · Lung infection  · Cough that gets worse  · More mucus, thicker mucus or mucus of a different color  · Tiredness, decreased energy, or trouble doing your usual activities  · Fever  · Chest tightness  · Your symptoms dont get better even when you use your usual medicines, inhalers, and nebulizer  · Trouble talking  · You feel confused  Causes of flare-ups  Unfortunately, a flare-up can happen even though you did everything right, and you followed your doctors instructions. Some causes of flare-ups are:  · Smoking or secondhand smoke  · Colds, the flu, or respiratory infections  · Air pollution  · Sudden change in the weather  · Dust, irritating chemicals, or strong fumes  · Not taking your medicines as prescribed  Home care  Here are some things you can do at home to treat a flare-up:  · Try not to panic. This makes it harder to breathe, and keeps you from doing the right things.  · Dont smoke or be around others who are smoking.  · Try to drink more fluids than usual during a flare-up, unless your doctor has told you not to because of heart and kidney problems. More fluids can help loosen the mucus.  · Use your inhalers and nebulizer, if you have one, as you have been told to.  · If you were given antibiotics, take them until they are used up or your doctor tells you  to stop. Its important to finish the antibiotics, even though you feel better. This will make sure the infection has cleared.  · If you were given prednisone or another steroid, finish it even if you feel better.  Preventing a flare-up  Even though flare-ups happen, the best way to treat one is to prevent it before it starts. Here are some pointers:  · Dont smoke or be around others who are smoking.  · Take your medicines as you have been told.  · Talk with your doctor about getting a flu shot every year. Also find out if you need a pneumonia shot.  · If there is a weather advisory warning to stay indoors, try to stay inside when possible.  · Try to eat healthy and get plenty of sleep.  · Try to avoid things that usually set you off, like dust, chemical fumes, hairsprays, or strong perfumes.  Follow-up care  Follow up with your healthcare provider, or as advised.  If a culture was done, you will be told if your treatment needs to be changed. You can call as directed for the results.  If X-rays were done, you will be notified of any new findings that may affect your care.  Call 911  Call 911 if any of these occur:  · You have trouble breathing  · You feel confused or its difficult to wake you up  · You faint or lose consciousness  · You have a rapid heart rate  · You have new pain in your chest, arm, shoulder, neck or upper back  When to seek medical advice  Call your healthcare provider right away if any of these occur:  · Wheezing or shortness of breath gets worse  · You need to use your inhalers more often than usual without relief  · Fever of 100.4°F (38ºC) or higher, or as directed by your healthcare provider  · Coughing up lots of dark-colored or bloody mucus (sputum)  · Chest pain with each breath  · You do not start to get better within 24 hours  · Swelling of your ankles gets worse  · Dizziness or weakness  Date Last Reviewed: 9/1/2016  © 5845-1210 [employer]. [address],  [place] [address]. All rights reserved. This information is not intended as a substitute for professional medical care. Always follow your healthcare professional's instructions.      Using a Nebulizer (Adult)    A nebulizer turns medicine into a mist. You breathe the mist in through a mask or a mouthpiece. To use your nebulizer, follow the steps below.  With a mask  · Put the correct dose of medicine in the cup. Attach the top as directed.  · Connect one end of the tubing to the cup and the other end to the nebulizer.  · Attach the mask to the cup.  · Place the mask over your nose and mouth. Make sure it fits securely and comfortably.  · Turn on the nebulizer.  · Take slow, deep breaths, keeping the nebulizer upright, until all the medicine is gone. This takes 10-15 minutes.  Be sure to follow directions you are given for cleaning the nebulizer and the mask.   With a mouthpiece    · Put the correct dose of medicine in the cup. Attach the top as directed.  · Connect one end of the tubing to the cup and the other end to the nebulizer.  · Attach the mouthpiece to the cup.  · Put the mouthpiece between your teeth and close your lips around it. Keep your tongue below the mouthpiece.  · Turn on the nebulizer.  · Take slow, deep breaths through the mouthpiece, keeping the nebulizer upright, until all the medicine is gone. This takes 10-15 minutes.  Be sure to follow directions you are given for cleaning the nebulizer and the mouthpiece.   Date Last Reviewed: 10/1/2016  © 0744-4405 [employer]. [address] [place] [address], [place] [address]. All rights reserved. This information is not intended as a substitute for professional medical care. Always follow your healthcare professional's instructions.    Thank you for enrolling in [facility]. Please follow the instructions below to securely access your online medical record. My allows you to send messages to your doctor, view your test results, renew your  prescriptions, schedule appointments, and more.     How Do I Sign Up?  1. In your Internet browser, go to [email or web address].  2. In the lower right of the page, click the Activate Now link located under the Have Access Code? Title.  3. Enter your [facility] Access Code exactly as it appears below. You will not need to use this code after youve completed the sign-up process. If you do not sign up before the expiration date, you must request a new code.  [facility] Access Code: Activation code not generated  Current Patient Portal Status: Account disabled    4. Enter Date of Birth (mm/dd/yyyy) as indicated and click the Next button. You will be taken to the next sign-up page.  5. Create a [facility] ID. This will be your new [facility] login ID and cannot be changed, so think of one that is secure and easy to remember.  6. Create a [facility] password.  Your password must be at least 8 characters long and contain at least 1 letter and 1 number.  You can change your password at any time.  7. Enter your Password Reset Question and Answer, then click the Next button.   8. Enter your e-mail address. You will receive e-mail notification when new information is available in [facility].  9. Click Sign Up. You can now view your medical record.     Additional Information  If you have questions, you can email [email or web address] or call [phone]  to talk to our [facility] staff. Remember, [facility] is NOT to be used for urgent needs. For medical emergencies, dial 911.     If not allergic,take tylenol (acetominophen) for fever control, chills, or body aches every 4 hours. Do not exceed 4000 mg/ day.If not allergic, take Motrin (Ibuprofen) every 4 hours for fever, chills, pain or inflammation. Do not exceed 2400 mg/day. You can alternate taking tylenol and motrin.    If you were prescribed a narcotic medication, do not drive or operate heavy equipment or machinery while taking these medications.  You must understand that  you've received an Urgent Care treatment only and that you may be released before all your medical problems are known or treated. You, the patient, will arrange for follow up care as instructed.  Follow up with your PCP or specialty clinic as directed in the next 1-2 weeks if not improved or as needed.  You can call [phone] to schedule an appointment with the appropriate provider.  If your condition worsens we recommend that you receive another evaluation at the emergency room immediately or contact your primary medical clinics after hours call service to discuss your concerns.    Please return here or go to the Emergency Department for any concerns or worsening of condition.    If you have been referred to another provider and wish to call to check on the status of your referral, please call [facility] Sched[facility] at [phone]

## 2020-10-30 ENCOUNTER — OFFICE VISIT (OUTPATIENT)
Dept: URGENT CARE | Facility: CLINIC | Age: 80
End: 2020-10-30
Payer: MEDICARE

## 2020-10-30 VITALS
TEMPERATURE: 97 F | RESPIRATION RATE: 18 BRPM | OXYGEN SATURATION: 97 % | HEIGHT: 61 IN | DIASTOLIC BLOOD PRESSURE: 79 MMHG | HEART RATE: 105 BPM | BODY MASS INDEX: 23.22 KG/M2 | WEIGHT: 123 LBS | SYSTOLIC BLOOD PRESSURE: 106 MMHG

## 2020-10-30 DIAGNOSIS — R05.9 COUGH: ICD-10-CM

## 2020-10-30 DIAGNOSIS — B96.89 ACUTE BACTERIAL BRONCHITIS: Primary | ICD-10-CM

## 2020-10-30 DIAGNOSIS — J20.8 ACUTE BACTERIAL BRONCHITIS: Primary | ICD-10-CM

## 2020-10-30 DIAGNOSIS — J98.01 BRONCHOSPASM: ICD-10-CM

## 2020-10-30 LAB
CTP QC/QA: YES
SARS-COV-2 RDRP RESP QL NAA+PROBE: NEGATIVE

## 2020-10-30 PROCEDURE — 99214 OFFICE O/P EST MOD 30 MIN: CPT | Mod: S$GLB,,, | Performed by: PHYSICIAN ASSISTANT

## 2020-10-30 PROCEDURE — U0002: ICD-10-PCS | Mod: QW,S$GLB,, | Performed by: PHYSICIAN ASSISTANT

## 2020-10-30 PROCEDURE — 99214 PR OFFICE/OUTPT VISIT, EST, LEVL IV, 30-39 MIN: ICD-10-PCS | Mod: S$GLB,,, | Performed by: PHYSICIAN ASSISTANT

## 2020-10-30 PROCEDURE — U0002 COVID-19 LAB TEST NON-CDC: HCPCS | Mod: QW,S$GLB,, | Performed by: PHYSICIAN ASSISTANT

## 2020-10-30 RX ORDER — PREDNISONE 20 MG/1
20 TABLET ORAL DAILY
Qty: 5 TABLET | Refills: 0 | Status: SHIPPED | OUTPATIENT
Start: 2020-10-30 | End: 2020-11-04

## 2020-10-30 RX ORDER — IPRATROPIUM BROMIDE 0.5 MG/2.5ML
500 SOLUTION RESPIRATORY (INHALATION) 4 TIMES DAILY
Qty: 3 BOX | Refills: 0 | Status: SHIPPED | OUTPATIENT
Start: 2020-10-30 | End: 2021-06-14

## 2020-10-30 RX ORDER — BENZONATATE 100 MG/1
200 CAPSULE ORAL 3 TIMES DAILY PRN
Qty: 20 CAPSULE | Refills: 0 | Status: SHIPPED | OUTPATIENT
Start: 2020-10-30 | End: 2022-02-09

## 2020-10-30 RX ORDER — AMOXICILLIN AND CLAVULANATE POTASSIUM 875; 125 MG/1; MG/1
1 TABLET, FILM COATED ORAL 2 TIMES DAILY
Qty: 20 TABLET | Refills: 0 | Status: SHIPPED | OUTPATIENT
Start: 2020-10-30 | End: 2020-11-09

## 2020-10-30 RX ORDER — ALBUTEROL SULFATE 1.25 MG/3ML
1.25 SOLUTION RESPIRATORY (INHALATION) EVERY 6 HOURS PRN
Qty: 3 BOX | Refills: 0 | Status: SHIPPED | OUTPATIENT
Start: 2020-10-30 | End: 2020-12-08 | Stop reason: ALTCHOICE

## 2020-10-30 RX ORDER — AZELASTINE 1 MG/ML
1 SPRAY, METERED NASAL 2 TIMES DAILY
Qty: 30 ML | Refills: 0 | Status: SHIPPED | OUTPATIENT
Start: 2020-10-30 | End: 2023-10-24

## 2020-10-30 NOTE — PROGRESS NOTES
"Subjective:       Patient ID: Gayathri Mack is a 80 y.o. female.    Vitals:  height is 5' 1" (1.549 m) and weight is 55.8 kg (123 lb). Her temperature is 97.1 °F (36.2 °C). Her blood pressure is 106/79 and her pulse is 105. Her respiration is 18 and oxygen saturation is 97%.     Chief Complaint: Wheezing    Patient presents to clinic with complaints of wheezing, coughing x 2 months on/off. She complains of a tickle in her throat that causes her to cough. Afebrile. Denies chest pain, SOB, GI upset, or abdominal pain. Denies any sick contacts that she is aware of. She has been using her Duo-Neb nebulizer machine 2-3 x daily. She lives with in [place] and currently spends her time between [facility] and [place]. She is unsure if there is an allergen that is contributing to her symptoms. She believes she may have undiagnosed COPD.     Wheezing   This is a recurrent problem. The current episode started more than 1 month ago. The problem occurs intermittently. The problem has been unchanged. Associated symptoms include coughing. Pertinent negatives include no abdominal pain, chest pain, chills, coryza, diarrhea, ear pain, fever, headaches, hemoptysis, neck pain, rash, rhinorrhea, shortness of breath, sore throat, sputum production, swollen glands or vomiting. Nothing aggravates the symptoms. She has tried ipratropium inhalers and OTC cough suppressant for the symptoms. The treatment provided mild relief.       Constitution: Negative for chills, fatigue and fever.   HENT: Negative for ear pain, congestion and sore throat.    Neck: Negative for neck pain and painful lymph nodes.   Cardiovascular: Negative for chest pain and leg swelling.   Eyes: Negative for double vision and blurred vision.   Respiratory: Positive for cough and wheezing. Negative for sputum production, bloody sputum and shortness of breath.    Gastrointestinal: Negative for abdominal pain, nausea, vomiting and diarrhea.   Genitourinary: Negative for dysuria, " frequency, urgency and history of kidney stones.   Musculoskeletal: Negative for joint pain, joint swelling, muscle cramps and muscle ache.   Skin: Negative for color change, pale, rash and bruising.   Allergic/Immunologic: Negative for seasonal allergies.   Neurological: Negative for dizziness, history of vertigo, light-headedness, passing out and headaches.   Hematologic/Lymphatic: Negative for swollen lymph nodes.   Psychiatric/Behavioral: Negative for nervous/anxious, sleep disturbance and depression. The patient is not nervous/anxious.        Objective:      Physical Exam   Constitutional: She is oriented to person, place, and time. She appears well-developed. She is cooperative.  Non-toxic appearance. She does not appear ill. No distress.   HENT:   Head: Normocephalic and atraumatic.   Ears:   Right Ear: Hearing, tympanic membrane, external ear and ear canal normal. Tympanic membrane is not erythematous and not bulging. impacted cerumen  Left Ear: Hearing, tympanic membrane, external ear and ear canal normal. Tympanic membrane is not erythematous and not bulging. impacted cerumen  Nose: No mucosal edema, rhinorrhea, nasal deformity or congestion. No epistaxis. Right sinus exhibits maxillary sinus tenderness. Right sinus exhibits no frontal sinus tenderness. Left sinus exhibits maxillary sinus tenderness. Left sinus exhibits no frontal sinus tenderness.   Mouth/Throat: Uvula is midline, oropharynx is clear and moist and mucous membranes are normal. No trismus in the jaw. Normal dentition. No uvula swelling. No oropharyngeal exudate, posterior oropharyngeal edema, posterior oropharyngeal erythema, tonsillar abscesses or cobblestoning. No tonsillar exudate.   Eyes: Conjunctivae and lids are normal. Right eye exhibits no discharge. Left eye exhibits no discharge. No scleral icterus.   Neck: Trachea normal, full passive range of motion without pain and phonation normal. Neck supple. No neck rigidity. No edema and  no erythema present.   Cardiovascular: Normal rate, regular rhythm, normal heart sounds and normal pulses. Exam reveals no gallop and no friction rub.   No murmur heard.  Pulmonary/Chest: Effort normal. No accessory muscle usage or stridor. No tachypnea. No respiratory distress. She has no decreased breath sounds. She has wheezes (mild in bilateral middle and lower lobes with prolonged expiration) in the right middle field, the right lower field, the left middle field and the left lower field. She has no rhonchi.   Abdominal: Normal appearance.   Musculoskeletal: Normal range of motion.         General: No deformity.   Lymphadenopathy:        Head (right side): No submandibular and no tonsillar adenopathy present.        Head (left side): No submandibular and no tonsillar adenopathy present.     She has no cervical adenopathy.        Right cervical: No superficial cervical and no posterior cervical adenopathy present.       Left cervical: No superficial cervical and no posterior cervical adenopathy present.   Neurological: She is alert and oriented to person, place, and time. She exhibits normal muscle tone. Coordination normal.   Skin: Skin is warm, dry, intact, not diaphoretic and not pale. Psychiatric: Her speech is normal and behavior is normal. Judgment and thought content normal.   Nursing note and vitals reviewed.        Results for orders placed or performed in visit on 10/30/20   POCT COVID-19 Rapid Screening   Result Value Ref Range    POC Rapid COVID Negative Negative     Acceptable Yes        Assessment:       1. Acute bacterial bronchitis    2. Cough    3. Bronchospasm        Plan:       Covid testing negative at this time and reviewed results with patient. Refills called in for nebulizer solution. Based on exam findings will cover with Augmentin for bacterial bronchitis based on length of symptoms and maxillary sinus TTP. No chest XR available today in clinic. Discussed to return to   in 1 week should symptoms persist. Further evaluation warranted due to chronicity of symptoms and discussed that a pulmonology referral will be placed today. Patient verbalized understanding and all of her questions were answered.     Acute bacterial bronchitis  -     amoxicillin-clavulanate 875-125mg (AUGMENTIN) 875-125 mg per tablet; Take 1 tablet by mouth 2 (two) times daily. for 10 days  Dispense: 20 tablet; Refill: 0  -     azelastine (ASTELIN) 137 mcg (0.1 %) nasal spray; 1 spray (137 mcg total) by Nasal route 2 (two) times daily.  Dispense: 30 mL; Refill: 0    Cough  -     POCT COVID-19 Rapid Screening  -     benzonatate (TESSALON PERLES) 100 MG capsule; Take 2 capsules (200 mg total) by mouth 3 (three) times daily as needed for Cough.  Dispense: 20 capsule; Refill: 0    Bronchospasm  -     predniSONE (DELTASONE) 20 MG tablet; Take 1 tablet (20 mg total) by mouth once daily. for 5 days  Dispense: 5 tablet; Refill: 0  -     albuterol (ACCUNEB) 1.25 mg/3 mL Nebu; Take 3 mLs (1.25 mg total) by nebulization every 6 (six) hours as needed. Rescue  Dispense: 3 Box; Refill: 0  -     ipratropium (ATROVENT) 0.02 % nebulizer solution; Take 2.5 mLs (500 mcg total) by nebulization 4 (four) times daily. Rescue  Dispense: 3 Box; Refill: 0  -     Ambulatory referral/consult to Pulmonology      Patient Instructions     Bronchitis with Wheezing (Viral or Bacterial: Adult)    Bronchitis is an infection of the air passages. It often occurs during a cold and is usually caused by a virus. Symptoms include cough with mucus (phlegm) and low-grade fever. This illness is contagious during the first few days and is spread through the air by coughing and sneezing, or by direct contact (touching the sick person and then touching your own eyes, nose, or mouth).  If there is a lot of inflammation, air flow is restricted. The air passages may also go into spasm, especially if you have asthma. This causes wheezing and difficulty breathing  even in people who do not have asthma.  Bronchitis usually lasts 7 to 14 days. The wheezing should improve with treatment during the first week. An inhaler is often prescribed to relax the air passages and stop wheezing. Antibiotics will be prescribed if your doctor thinks there is also a secondary bacterial infection.  Home care  · If symptoms are severe, rest at home for the first 2 to 3 days. When you go back to your usual activities, don't let yourself get too tired.  · Do not smoke. Also avoid being exposed to secondhand smoke.  · You may use over-the-counter medicine to control fever or pain, unless another medicine was prescribed. Note: If you have chronic liver or kidney disease or have ever had a stomach ulcer or gastrointestinal bleeding, talk with your healthcare provider before using these medicines. Also talk to your provider if you are taking medicine to prevent blood clots.) Aspirin should never be given to anyone younger than 18 years of age who is ill with a viral infection or fever. It may cause severe liver or brain damage.  · Your appetite may be poor, so a light diet is fine. Avoid dehydration by drinking 6 to 8 glasses of fluids per day (such as water, soft drinks, sports drinks, juices, tea, or soup). Extra fluids will help loosen secretions in the nose and lungs.  · Over-the-counter cough, cold, and sore-throat medicines will not shorten the length of the illness, but they may be helpful to reduce symptoms. (Note: Do not use decongestants if you have high blood pressure.)  · If you were given an inhaler, use it exactly as directed. If you need to use it more often than prescribed, your condition may be worsening. If this happens, contact your healthcare provider.  · If prescribed, finish all antibiotic medicine, even if you are feeling better after only a few days.  Follow-up care  Follow up with your healthcare provider, or as advised. If you had an X-ray or ECG (electrocardiogram), a  specialist will review it. You will be notified of any new findings that may affect your care.  Note: If you are age 65 or older, or if you have a chronic lung disease or condition that affects your immune system, or you smoke, talk to your healthcare provider about having a pneumococcal vaccinations and a yearly influenza vaccination (flu shot).  When to seek medical advice  Call your healthcare provider right away if any of these occur:  · Fever of 100.4°F (38°C) or higher  · Coughing up increasing amounts of colored sputum  · Weakness, drowsiness, headache, facial pain, ear pain, or a stiff neck  Call 911, or get immediate medical care  Contact emergency services right away if any of these occur.  · Coughing up blood  · Worsening weakness, drowsiness, headache, or stiff neck  · Increased wheezing not helped with medication, shortness of breath, or pain with breathing  Date Last Reviewed: 9/13/2015  © 8788-4171 [employer] [address] [place] [address]. All rights reserved. This information is not intended as a substitute for professional medical care. Always follow your healthcare professional's instructions.        Bronchospasm (Adult)    Bronchospasm occurs when the airways (bronchial tubes) go into spasm and contract. This makes it hard to breathe and causes wheezing (a high-pitched whistling sound). Bronchospasm can also cause frequent coughing without wheezing.  Bronchospasm is due to irritation, inflammation, or allergic reaction of the airways. People with asthma get bronchospasm. However, not everyone with bronchospasm has asthma.  Being exposed to harmful fumes, a recent case of bronchitis, exercise, or a flare-up of chronic obstructive pulmonary disease (COPD) may cause the airways to spasm. An episode of bronchospasm may last 7 to 14 days. Medicine may be prescribed to relax the airways and prevent wheezing. Antibiotics will be prescribed only if your healthcare provider  thinks there is a bacterial infection. Antibiotics do not help a viral infection.  Home care  · Drink lots of water or other fluids (at least 10 glasses a day) during an attack. This will loosen lung secretions and make it easier to breathe. If you have heart or kidney disease, check with your doctor before you drink extra fluids.  · Take prescribed medicine exactly at the times advised. If you take an inhaled medicine to help with breathing, do not use it more than once every 4 hours, unless told to do so. If prescribed an antibiotic or prednisone, take all of the medicine, even if you are feeling better after a few days.  · Do not smoke. Also avoid being exposed to secondhand smoke.  · If you were given an inhaler, use it exactly as directed. If you need to use it more often than prescribed, your condition may be getting worse. Contact your healthcare provider.  Follow-up care  Follow up with your healthcare provider, or as advised.  Note: If you are age 65 or older, have a chronic lung disease or condition that affects your immune system, or you smoke, we recommend getting pneumococcal vaccinations, as well as an influenza vaccination (flu shot) every autumn. Ask your healthcare provider about this.  When to seek medical advice  Call your healthcare provider right away if any of these occur:  · You need to use your inhalers more often than usual.  · You develop a fever of 100.4°F (38°C) or higher.  · You are coughing up lots of dark-colored sputum (mucus).  · You do not start to improve within 24 hours.  Call 911, or get immediate medical care  Contact emergency services if any of these occur:  · Coughing up bloody sputum (mucus)  · Chest pain with each breath  · Increased wheezing or shortness of breath  Date Last Reviewed: 9/13/2015  © 7914-0506 [employer]. [address] [place] [address], [place] [address]. All rights reserved. This information is not intended as a substitute for professional medical  care. Always follow your healthcare professional's instructions.      Be sure to use your nebulizer every 6-8 hours as needed for any cough, wheeze or shortness of breath.  Make sure you have your rescue inhaler with you and you may use your inhaler as needed every 4-6 hours. Take 2 puffs for any cough, wheeze or shortness of breath.      A referral to [facility] has been placed for you.  Please call [phone] to make an appt    Please follow up with your Primary care provider within 2-5 days if your signs and symptoms have not resolved or worsen.     If your condition worsens or fails to improve we recommend that you receive another evaluation at the emergency room immediately or contact your primary medical clinic to discuss your concerns.   You must understand that you have received an Urgent Care treatment only and that you may be released before all of your medical problems are known or treated. You, the patient, will arrange for follow up care as instructed.     RED FLAGS/WARNING SYMPTOMS DISCUSSED WITH PATIENT THAT WOULD WARRANT EMERGENT MEDICAL ATTENTION. PATIENT VERBALIZED UNDERSTANDING.

## 2020-10-30 NOTE — PATIENT INSTRUCTIONS
Bronchitis with Wheezing (Viral or Bacterial: Adult)    Bronchitis is an infection of the air passages. It often occurs during a cold and is usually caused by a virus. Symptoms include cough with mucus (phlegm) and low-grade fever. This illness is contagious during the first few days and is spread through the air by coughing and sneezing, or by direct contact (touching the sick person and then touching your own eyes, nose, or mouth).  If there is a lot of inflammation, air flow is restricted. The air passages may also go into spasm, especially if you have asthma. This causes wheezing and difficulty breathing even in people who do not have asthma.  Bronchitis usually lasts 7 to 14 days. The wheezing should improve with treatment during the first week. An inhaler is often prescribed to relax the air passages and stop wheezing. Antibiotics will be prescribed if your doctor thinks there is also a secondary bacterial infection.  Home care  · If symptoms are severe, rest at home for the first 2 to 3 days. When you go back to your usual activities, don't let yourself get too tired.  · Do not smoke. Also avoid being exposed to secondhand smoke.  · You may use over-the-counter medicine to control fever or pain, unless another medicine was prescribed. Note: If you have chronic liver or kidney disease or have ever had a stomach ulcer or gastrointestinal bleeding, talk with your healthcare provider before using these medicines. Also talk to your provider if you are taking medicine to prevent blood clots.) Aspirin should never be given to anyone younger than 18 years of age who is ill with a viral infection or fever. It may cause severe liver or brain damage.  · Your appetite may be poor, so a light diet is fine. Avoid dehydration by drinking 6 to 8 glasses of fluids per day (such as water, soft drinks, sports drinks, juices, tea, or soup). Extra fluids will help loosen secretions in the nose and lungs.  · Over-the-counter  cough, cold, and sore-throat medicines will not shorten the length of the illness, but they may be helpful to reduce symptoms. (Note: Do not use decongestants if you have high blood pressure.)  · If you were given an inhaler, use it exactly as directed. If you need to use it more often than prescribed, your condition may be worsening. If this happens, contact your healthcare provider.  · If prescribed, finish all antibiotic medicine, even if you are feeling better after only a few days.  Follow-up care  Follow up with your healthcare provider, or as advised. If you had an X-ray or ECG (electrocardiogram), a specialist will review it. You will be notified of any new findings that may affect your care.  Note: If you are age 65 or older, or if you have a chronic lung disease or condition that affects your immune system, or you smoke, talk to your healthcare provider about having a pneumococcal vaccinations and a yearly influenza vaccination (flu shot).  When to seek medical advice  Call your healthcare provider right away if any of these occur:  · Fever of 100.4°F (38°C) or higher  · Coughing up increasing amounts of colored sputum  · Weakness, drowsiness, headache, facial pain, ear pain, or a stiff neck  Call 911, or get immediate medical care  Contact emergency services right away if any of these occur.  · Coughing up blood  · Worsening weakness, drowsiness, headache, or stiff neck  · Increased wheezing not helped with medication, shortness of breath, or pain with breathing  Date Last Reviewed: 9/13/2015  © 5459-1764 [employer]. [address] [place] [address], [place] [address]. All rights reserved. This information is not intended as a substitute for professional medical care. Always follow your healthcare professional's instructions.        Bronchospasm (Adult)    Bronchospasm occurs when the airways (bronchial tubes) go into spasm and contract. This makes it hard to breathe and causes wheezing (a  high-pitched whistling sound). Bronchospasm can also cause frequent coughing without wheezing.  Bronchospasm is due to irritation, inflammation, or allergic reaction of the airways. People with asthma get bronchospasm. However, not everyone with bronchospasm has asthma.  Being exposed to harmful fumes, a recent case of bronchitis, exercise, or a flare-up of chronic obstructive pulmonary disease (COPD) may cause the airways to spasm. An episode of bronchospasm may last 7 to 14 days. Medicine may be prescribed to relax the airways and prevent wheezing. Antibiotics will be prescribed only if your healthcare provider thinks there is a bacterial infection. Antibiotics do not help a viral infection.  Home care  · Drink lots of water or other fluids (at least 10 glasses a day) during an attack. This will loosen lung secretions and make it easier to breathe. If you have heart or kidney disease, check with your doctor before you drink extra fluids.  · Take prescribed medicine exactly at the times advised. If you take an inhaled medicine to help with breathing, do not use it more than once every 4 hours, unless told to do so. If prescribed an antibiotic or prednisone, take all of the medicine, even if you are feeling better after a few days.  · Do not smoke. Also avoid being exposed to secondhand smoke.  · If you were given an inhaler, use it exactly as directed. If you need to use it more often than prescribed, your condition may be getting worse. Contact your healthcare provider.  Follow-up care  Follow up with your healthcare provider, or as advised.  Note: If you are age 65 or older, have a chronic lung disease or condition that affects your immune system, or you smoke, we recommend getting pneumococcal vaccinations, as well as an influenza vaccination (flu shot) every autumn. Ask your healthcare provider about this.  When to seek medical advice  Call your healthcare provider right away if any of these occur:  · You need  to use your inhalers more often than usual.  · You develop a fever of 100.4°F (38°C) or higher.  · You are coughing up lots of dark-colored sputum (mucus).  · You do not start to improve within 24 hours.  Call 911, or get immediate medical care  Contact emergency services if any of these occur:  · Coughing up bloody sputum (mucus)  · Chest pain with each breath  · Increased wheezing or shortness of breath  Date Last Reviewed: 9/13/2015 © 2000-2017 [employer]. [address] [place] [address]. All rights reserved. This information is not intended as a substitute for professional medical care. Always follow your healthcare professional's instructions.      Be sure to use your nebulizer every 6-8 hours as needed for any cough, wheeze or shortness of breath.  Make sure you have your rescue inhaler with you and you may use your inhaler as needed every 4-6 hours. Take 2 puffs for any cough, wheeze or shortness of breath.      A referral to [facility] has been placed for you.  Please call [phone] to make an appt    Please follow up with your Primary care provider within 2-5 days if your signs and symptoms have not resolved or worsen.     If your condition worsens or fails to improve we recommend that you receive another evaluation at the emergency room immediately or contact your primary medical clinic to discuss your concerns.   You must understand that you have received an Urgent Care treatment only and that you may be released before all of your medical problems are known or treated. You, the patient, will arrange for follow up care as instructed.     RED FLAGS/WARNING SYMPTOMS DISCUSSED WITH PATIENT THAT WOULD WARRANT EMERGENT MEDICAL ATTENTION. PATIENT VERBALIZED UNDERSTANDING.

## 2020-12-08 ENCOUNTER — OFFICE VISIT (OUTPATIENT)
Dept: PULMONOLOGY | Facility: CLINIC | Age: 80
End: 2020-12-08
Payer: MEDICARE

## 2020-12-08 ENCOUNTER — TELEPHONE (OUTPATIENT)
Dept: PULMONOLOGY | Facility: CLINIC | Age: 80
End: 2020-12-08

## 2020-12-08 VITALS
HEIGHT: 61 IN | WEIGHT: 147.69 LBS | SYSTOLIC BLOOD PRESSURE: 110 MMHG | DIASTOLIC BLOOD PRESSURE: 82 MMHG | HEART RATE: 98 BPM | BODY MASS INDEX: 27.88 KG/M2 | OXYGEN SATURATION: 96 %

## 2020-12-08 DIAGNOSIS — J44.9 CHRONIC OBSTRUCTIVE PULMONARY DISEASE, UNSPECIFIED COPD TYPE: Primary | ICD-10-CM

## 2020-12-08 DIAGNOSIS — R05.9 COUGH: ICD-10-CM

## 2020-12-08 DIAGNOSIS — J44.1 CHRONIC OBSTRUCTIVE PULMONARY DISEASE WITH ACUTE EXACERBATION: ICD-10-CM

## 2020-12-08 PROCEDURE — 99214 OFFICE O/P EST MOD 30 MIN: CPT | Mod: S$PBB,ICN,, | Performed by: NURSE PRACTITIONER

## 2020-12-08 PROCEDURE — 99999 PR PBB SHADOW E&M-EST. PATIENT-LVL III: ICD-10-PCS | Mod: PBBFAC,,, | Performed by: NURSE PRACTITIONER

## 2020-12-08 PROCEDURE — 99999 PR PBB SHADOW E&M-EST. PATIENT-LVL III: CPT | Mod: PBBFAC,,, | Performed by: NURSE PRACTITIONER

## 2020-12-08 PROCEDURE — 99213 OFFICE O/P EST LOW 20 MIN: CPT | Mod: PBBFAC,PO | Performed by: NURSE PRACTITIONER

## 2020-12-08 PROCEDURE — 99214 PR OFFICE/OUTPT VISIT, EST, LEVL IV, 30-39 MIN: ICD-10-PCS | Mod: S$PBB,ICN,, | Performed by: NURSE PRACTITIONER

## 2020-12-08 RX ORDER — UMECLIDINIUM 62.5 UG/1
62.5 AEROSOL, POWDER ORAL DAILY
Qty: 1 EACH | Refills: 2 | Status: SHIPPED | OUTPATIENT
Start: 2020-12-08 | End: 2021-01-12

## 2020-12-08 RX ORDER — MONTELUKAST SODIUM 10 MG/1
10 TABLET ORAL NIGHTLY
Qty: 90 TABLET | Refills: 3 | Status: SHIPPED | OUTPATIENT
Start: 2020-12-08 | End: 2021-12-20 | Stop reason: SDUPTHER

## 2020-12-08 RX ORDER — PREDNISONE 20 MG/1
TABLET ORAL
Qty: 18 TABLET | Refills: 1 | Status: SHIPPED | OUTPATIENT
Start: 2020-12-08 | End: 2021-06-14

## 2020-12-08 RX ORDER — GUAIFENESIN 600 MG/1
1200 TABLET, EXTENDED RELEASE ORAL 2 TIMES DAILY
Qty: 60 TABLET | Refills: 2 | Status: SHIPPED | OUTPATIENT
Start: 2020-12-08 | End: 2021-06-14

## 2020-12-08 NOTE — PROGRESS NOTES
12/8/2020    Gayathri Mack  New Patient Consult    Chief Complaint   Patient presents with    COPD     new patient    Cough     worsen     Wheezing       HPI: 12/8/2020- onset 3 months, seen at [facility] for bronchitis, tx with albuterol rescue inhaler, oral steroids, nebulizer budesonide, and antibiotics.  First occurences in lifetime  In clinic with daughter, complaint of SOB- onset 1 year, worse with exertion, improves with rest, worse with cold weather, associated with sinus complaints, associated with chest tightness and wheeze.   Cough- onset 3 months, associated with post nasal drip, worse in early morning and late evening, improves with sipping water and cough drops, productive in mornings clear nickel size, having coughing fits  Fatigue- sleeps when she does not want to. Takes daily naps. States having morning headaches, no mental cloudiness.   Social Hx: lives with daughter and pet dog, Retired , possible mold exposure from work site, no known Asbestosis exposure, Smoking Hx: quit 1 month, 60 pack years.   Family Hx: Mother Lung Cancer, Sister COPD, no Asthma, no MADELAINE  Medical Hx: no previous pneumonia ; no previous shoulder/chest surgery        The chief compliant  problem is new to me  PFSH:  Past Medical History:   Diagnosis Date    Cancer          No past surgical history on file.  Social History     Tobacco Use    Smoking status: Light Tobacco Smoker     Packs/day: 0.50     Types: Cigarettes    Smokeless tobacco: Never Used   Substance Use Topics    Alcohol use: No    Drug use: Not on file     No family history on file.  Review of patient's allergies indicates:  No Known Allergies  I have reviewed past medical, family, and social history. I have reviewed previous nurse notes.    Performance Status:The patient's activity level is functions out of house.          Review of Systems   Constitutional: Negative for activity change, appetite change, chills, diaphoresis, fatigue,  "fever and unexpected weight change.   HENT: Negative for dental problem, postnasal drip, rhinorrhea, sinus pressure, sinus pain, sneezing, sore throat, trouble swallowing.  Positive for hoarse voice  Respiratory: Negative for apnea,  and stridor.  Positive for cough, chest tightness, shortness of breath, wheezing, snoring  Cardiovascular: Negative for chest pain, palpitations and leg swelling.   Gastrointestinal: Negative for abdominal distention, abdominal pain, constipation and nausea.   Musculoskeletal: Negative for gait problem, myalgias.  Skin: Negative for color change and pallor.   Allergic/Immunologic: Negative for environmental allergies and food allergies.   Neurological: Negative for dizziness, speech difficulty, weakness, light-headedness, numbness. Positive for headaches  Hematological: Negative for adenopathy. Does not bruise/bleed easily.   Psychiatric/Behavioral: Negative for dysphoric mood and sleep disturbance. The patient is not nervous/anxious.           Exam:Comprehensive exam done. /82 (BP Location: Left arm, Patient Position: Sitting, BP Method: Medium (Automatic))   Pulse 98   Ht 5' 1" (1.549 m)   Wt 67 kg (147 lb 11.3 oz)   SpO2 96% Comment: on room air at rest  BMI 27.91 kg/m²   Exam included Vitals as listed  Constitutional:  oriented to person, place, and time.  appears well-developed. No distress.   Nose: Nose normal.   Mouth/Throat: Uvula is midline, oropharynx is clear and moist and mucous membranes are normal. No dental caries. No oropharyngeal exudate, posterior oropharyngeal edema, posterior oropharyngeal erythema or tonsillar abscesses.  Mallapatti (M) score 2  Eyes: Pupils are equal, round, and reactive to light.   Neck: No JVD present. No thyromegaly present.   Cardiovascular: Normal rate, regular rhythm and normal heart sounds. Exam reveals no gallop and no friction rub.   No murmur heard.  Pulmonary/Chest: Effort normal and breath sounds abnormal: bilateral " expiratory wheeze. No accessory muscle usage or stridor. No apnea and no tachypnea. No respiratory distress, decreased breath sounds, rhonchi, rales, or tenderness.   Abdominal: Soft.  exhibits no mass. There is no tenderness. No hepatosplenomegaly, hernias and normoactive bowel sounds  Musculoskeletal: Normal range of motion. exhibits no edema.   Lymphadenopathy:      no cervical adenopathy.    no axillary adenopathy.   Neurological:  alert and oriented to person, place, and time. not disoriented.   Skin: Skin is warm and dry. Capillary refill takes less 2 sec. No cyanosis or erythema. No pallor. Nails show no clubbing.   Psychiatric: normal mood and affect. behavior is normal. Judgment and thought content normal.       Radiographs (ct chest and cxr) reviewed: results reviewed    XR CHEST PA AND LATERAL  09/20/2020   The lungs are clear, with normal appearance of pulmonary vasculature and no pleural effusion or pneumothorax.     The cardiac silhouette is normal in size. The hilar and mediastinal contours are unremarkable.        Labs none available    No results found for: WBC, RBC, HGB, HCT, MCV, MCH, MCHC, RDW, PLT, MPV, GRAN, LYMPH, MONO, EOS, BASO, EOSINOPHIL, BASOPHIL    PFT will be done and results to be reviewed  Pulmonary Functions Testing Results:        Plan:  Clinical impression is apparently straight forward and impression with management as below.    Gayathri was seen today for copd, cough and wheezing.    Diagnoses and all orders for this visit:    Chronic obstructive pulmonary disease, unspecified COPD type  -     umeclidinium (INCRUSE ELLIPTA) 62.5 mcg/actuation inhalation capsule; Inhale 1 capsule (63 mcg total) into the lungs once daily. Controller  -     Complete PFT with bronchodilator; Future  -     guaiFENesin (MUCINEX) 600 mg 12 hr tablet; Take 2 tablets (1,200 mg total) by mouth 2 (two) times daily.    Cough  -     guaiFENesin (MUCINEX) 600 mg 12 hr tablet; Take 2 tablets (1,200 mg total) by  mouth 2 (two) times daily.  -     montelukast (SINGULAIR) 10 mg tablet; Take 1 tablet (10 mg total) by mouth every evening.    Chronic obstructive pulmonary disease with acute exacerbation  -     predniSONE (DELTASONE) 20 MG tablet; 3 pills for 3 days, 2 pills for 3 days, 1 pill for 3 days        Follow up in about 4 weeks (around 1/5/2021), or if symptoms worsen or fail to improve.    Discussed with patient above for education the following:      Patient Instructions   Ordering lung strength test    Starting new medication  Arnuity 1 puff once a day every day in morning    Use Albuterol rescue inhaler 2 puffs before activity and when you feel short or breath, cough, chest tightness, or wheeze.    Use nebulizer up to 4 times a day, use at least once a day with mucinex pills three days a week.

## 2020-12-08 NOTE — PATIENT INSTRUCTIONS
Ordering lung strength test    Starting new medication  Arnuity 1 puff once a day every day in morning    Use Albuterol rescue inhaler 2 puffs before activity and when you feel short or breath, cough, chest tightness, or wheeze.    Use nebulizer up to 4 times a day, use at least once a day with mucinex pills three days a week.

## 2020-12-08 NOTE — TELEPHONE ENCOUNTER
Attempted to contact patients daughter, no answer, vm full.     ----- Message from Jenniffer Frank sent at 12/8/2020  2:09 PM CST -----  Type: Needs Medical Advice  Who Called:  Beatrice Mack (Daughter)  Pharmacy name and phone #:    [id number] - [place] [address] & [address] [address]  Phone: [phone] Fax: [phone]  Best Call Back Number: [phone]  Additional Information: The patient's prescription for her nebulizer solution ventolin was not sent to the pharmacy along with her other medications. They are requesting that it be sent as well.

## 2020-12-15 DIAGNOSIS — R06.2 WHEEZING: ICD-10-CM

## 2020-12-15 DIAGNOSIS — Z87.09 HISTORY OF COPD: ICD-10-CM

## 2020-12-15 DIAGNOSIS — R05.9 COUGH: ICD-10-CM

## 2020-12-15 DIAGNOSIS — J06.9 ACUTE URI: ICD-10-CM

## 2020-12-15 RX ORDER — IPRATROPIUM BROMIDE AND ALBUTEROL SULFATE 2.5; .5 MG/3ML; MG/3ML
3 SOLUTION RESPIRATORY (INHALATION) EVERY 6 HOURS PRN
Qty: 25 VIAL | Refills: 2 | Status: SHIPPED | OUTPATIENT
Start: 2020-12-15 | End: 2022-02-09

## 2021-01-12 ENCOUNTER — OFFICE VISIT (OUTPATIENT)
Dept: PULMONOLOGY | Facility: CLINIC | Age: 81
End: 2021-01-12
Payer: MEDICARE

## 2021-01-12 VITALS
WEIGHT: 151.81 LBS | BODY MASS INDEX: 28.66 KG/M2 | SYSTOLIC BLOOD PRESSURE: 123 MMHG | DIASTOLIC BLOOD PRESSURE: 77 MMHG | HEIGHT: 61 IN | OXYGEN SATURATION: 96 % | HEART RATE: 73 BPM

## 2021-01-12 DIAGNOSIS — J44.89 COPD WITH ASTHMA: Primary | ICD-10-CM

## 2021-01-12 DIAGNOSIS — G47.00 INSOMNIA, UNSPECIFIED TYPE: ICD-10-CM

## 2021-01-12 PROCEDURE — 99214 OFFICE O/P EST MOD 30 MIN: CPT | Mod: PBBFAC,PO | Performed by: NURSE PRACTITIONER

## 2021-01-12 PROCEDURE — 99999 PR PBB SHADOW E&M-EST. PATIENT-LVL IV: ICD-10-PCS | Mod: PBBFAC,,, | Performed by: NURSE PRACTITIONER

## 2021-01-12 PROCEDURE — 99999 PR PBB SHADOW E&M-EST. PATIENT-LVL IV: CPT | Mod: PBBFAC,,, | Performed by: NURSE PRACTITIONER

## 2021-01-12 PROCEDURE — 99213 PR OFFICE/OUTPT VISIT, EST, LEVL III, 20-29 MIN: ICD-10-PCS | Mod: S$PBB,,, | Performed by: NURSE PRACTITIONER

## 2021-01-12 PROCEDURE — 99213 OFFICE O/P EST LOW 20 MIN: CPT | Mod: S$PBB,,, | Performed by: NURSE PRACTITIONER

## 2021-01-12 RX ORDER — FLUTICASONE FUROATE, UMECLIDINIUM BROMIDE AND VILANTEROL TRIFENATATE 200; 62.5; 25 UG/1; UG/1; UG/1
1 POWDER RESPIRATORY (INHALATION) DAILY
Qty: 60 EACH | Refills: 11 | Status: SHIPPED | OUTPATIENT
Start: 2021-01-12 | End: 2022-01-19 | Stop reason: SDUPTHER

## 2021-01-12 RX ORDER — ASCORBIC ACID 500 MG
300 TABLET ORAL NIGHTLY PRN
COMMUNITY
Start: 2021-01-12 | End: 2023-09-20

## 2021-01-13 ENCOUNTER — HOSPITAL ENCOUNTER (OUTPATIENT)
Dept: PULMONOLOGY | Facility: HOSPITAL | Age: 81
Discharge: HOME | End: 2021-01-13
Attending: NURSE PRACTITIONER
Payer: MEDICARE

## 2021-01-13 DIAGNOSIS — J44.9 CHRONIC OBSTRUCTIVE PULMONARY DISEASE, UNSPECIFIED COPD TYPE: ICD-10-CM

## 2021-01-13 PROCEDURE — 94060 PR EVAL OF BRONCHOSPASM: ICD-10-PCS | Mod: 26,,, | Performed by: INTERNAL MEDICINE

## 2021-01-13 PROCEDURE — 94060 EVALUATION OF WHEEZING: CPT | Mod: 26,,, | Performed by: INTERNAL MEDICINE

## 2021-01-13 PROCEDURE — 94060 EVALUATION OF WHEEZING: CPT

## 2021-01-13 PROCEDURE — 94729 DIFFUSING CAPACITY: CPT | Mod: 26,,, | Performed by: INTERNAL MEDICINE

## 2021-01-13 PROCEDURE — 94729 PR C02/MEMBANE DIFFUSE CAPACITY: ICD-10-PCS | Mod: 26,,, | Performed by: INTERNAL MEDICINE

## 2021-01-13 PROCEDURE — 94729 DIFFUSING CAPACITY: CPT

## 2021-01-13 PROCEDURE — 94727 GAS DIL/WSHOT DETER LNG VOL: CPT | Mod: 26,,, | Performed by: INTERNAL MEDICINE

## 2021-01-13 PROCEDURE — 94727 PR PULM FUNCTION TEST BY GAS: ICD-10-PCS | Mod: 26,,, | Performed by: INTERNAL MEDICINE

## 2021-01-13 PROCEDURE — 94727 GAS DIL/WSHOT DETER LNG VOL: CPT

## 2021-01-15 ENCOUNTER — TELEPHONE (OUTPATIENT)
Dept: PULMONOLOGY | Facility: CLINIC | Age: 81
End: 2021-01-15

## 2021-01-19 LAB
BRPFT: NORMAL
DLCO ADJ PRE: 10.77 ML/(MIN*MMHG)
DLCO SINGLE BREATH LLN: 12.39
DLCO SINGLE BREATH PRE REF: 59.4 %
DLCO SINGLE BREATH REF: 18.12
DLCOC SBVA LLN: 2.5
DLCOC SBVA PRE REF: 64.9 %
DLCOC SBVA REF: 4.08
DLCOC SINGLE BREATH LLN: 12.39
DLCOC SINGLE BREATH PRE REF: 59.4 %
DLCOC SINGLE BREATH REF: 18.12
DLCOVA LLN: 2.5
DLCOVA PRE REF: 64.9 %
DLCOVA PRE: 2.65 ML/(MIN*MMHG*L)
DLCOVA REF: 4.08
DLVAADJ PRE: 2.65 ML/(MIN*MMHG*L)
ERVN2 LLN: -16449.52
ERVN2 PRE REF: 56.1 %
ERVN2 PRE: 0.27 L
ERVN2 REF: 0.48
FEF 25 75 CHG: 2.9 %
FEF 25 75 LLN: 0.61
FEF 25 75 POST REF: 59 %
FEF 25 75 PRE REF: 57.3 %
FEF 25 75 REF: 1.46
FET100 CHG: -20.9 %
FEV1 CHG: 2.9 %
FEV1 FVC CHG: 1.7 %
FEV1 FVC LLN: 63
FEV1 FVC POST REF: 89.5 %
FEV1 FVC PRE REF: 88.1 %
FEV1 FVC REF: 77
FEV1 LLN: 1.23
FEV1 POST REF: 80.5 %
FEV1 PRE REF: 78.2 %
FEV1 REF: 1.76
FRCN2 LLN: 1.73
FRCN2 PRE REF: 103.6 %
FRCN2 REF: 2.55
FVC CHG: 1.2 %
FVC LLN: 1.61
FVC POST REF: 88.9 %
FVC PRE REF: 87.8 %
FVC REF: 2.29
IVC PRE: 2 L
IVC SINGLE BREATH LLN: 1.61
IVC SINGLE BREATH PRE REF: 87.2 %
IVC SINGLE BREATH REF: 2.29
MVV LLN: 53
MVV PRE REF: 74.3 %
MVV REF: 62
PEF CHG: 44.3 %
PEF LLN: 2.88
PEF POST REF: 115 %
PEF PRE REF: 79.7 %
PEF REF: 4.44
POST FEF 25 75: 0.86 L/S
POST FET 100: 4.82 SEC
POST FEV1 FVC: 69.33 %
POST FEV1: 1.41 L
POST FVC: 2.04 L
POST PEF: 5.11 L/S
PRE DLCO: 10.77 ML/(MIN*MMHG)
PRE FEF 25 75: 0.84 L/S
PRE FET 100: 6.1 SEC
PRE FEV1 FVC: 68.21 %
PRE FEV1: 1.37 L
PRE FRC N2: 2.64 L
PRE FVC: 2.01 L
PRE MVV: 46 L/MIN
PRE PEF: 3.54 L/S
RVN2 LLN: 1.49
RVN2 PRE REF: 102.3 %
RVN2 PRE: 2.12 L
RVN2 REF: 2.07
RVN2TLCN2 LLN: 36.23
RVN2TLCN2 PRE REF: 111.9 %
RVN2TLCN2 PRE: 51.27 %
RVN2TLCN2 REF: 45.82
TLCN2 LLN: 3.45
TLCN2 PRE REF: 93 %
TLCN2 PRE: 4.13 L
TLCN2 REF: 4.44
VA PRE: 4.08 L
VA SINGLE BREATH LLN: 4.29
VA SINGLE BREATH PRE REF: 95 %
VA SINGLE BREATH REF: 4.29
VCMAXN2 LLN: 1.61
VCMAXN2 PRE REF: 87.8 %
VCMAXN2 PRE: 2.01 L
VCMAXN2 REF: 2.29

## 2021-02-02 ENCOUNTER — TELEPHONE (OUTPATIENT)
Dept: PULMONOLOGY | Facility: CLINIC | Age: 81
End: 2021-02-02

## 2021-04-27 ENCOUNTER — TELEPHONE (OUTPATIENT)
Dept: PULMONOLOGY | Facility: CLINIC | Age: 81
End: 2021-04-27

## 2021-05-05 ENCOUNTER — OFFICE VISIT (OUTPATIENT)
Dept: PULMONOLOGY | Facility: CLINIC | Age: 81
End: 2021-05-05
Payer: MEDICARE

## 2021-05-05 VITALS
SYSTOLIC BLOOD PRESSURE: 161 MMHG | WEIGHT: 156.5 LBS | OXYGEN SATURATION: 95 % | HEIGHT: 61 IN | DIASTOLIC BLOOD PRESSURE: 99 MMHG | HEART RATE: 80 BPM | BODY MASS INDEX: 29.55 KG/M2

## 2021-05-05 DIAGNOSIS — R05.3 PERSISTENT COUGH: ICD-10-CM

## 2021-05-05 DIAGNOSIS — J44.9 CHRONIC OBSTRUCTIVE PULMONARY DISEASE, UNSPECIFIED COPD TYPE: ICD-10-CM

## 2021-05-05 DIAGNOSIS — R05.9 COUGH: Primary | ICD-10-CM

## 2021-05-05 PROCEDURE — 1159F MED LIST DOCD IN RCRD: CPT | Mod: S$GLB,,, | Performed by: NURSE PRACTITIONER

## 2021-05-05 PROCEDURE — 3288F PR FALLS RISK ASSESSMENT DOCUMENTED: ICD-10-PCS | Mod: S$GLB,,, | Performed by: NURSE PRACTITIONER

## 2021-05-05 PROCEDURE — 99999 PR PBB SHADOW E&M-EST. PATIENT-LVL V: ICD-10-PCS | Mod: PBBFAC,,, | Performed by: NURSE PRACTITIONER

## 2021-05-05 PROCEDURE — 1101F PR PT FALLS ASSESS DOC 0-1 FALLS W/OUT INJ PAST YR: ICD-10-PCS | Mod: S$GLB,,, | Performed by: NURSE PRACTITIONER

## 2021-05-05 PROCEDURE — 99999 PR PBB SHADOW E&M-EST. PATIENT-LVL V: CPT | Mod: PBBFAC,,, | Performed by: NURSE PRACTITIONER

## 2021-05-05 PROCEDURE — 1101F PT FALLS ASSESS-DOCD LE1/YR: CPT | Mod: S$GLB,,, | Performed by: NURSE PRACTITIONER

## 2021-05-05 PROCEDURE — 3288F FALL RISK ASSESSMENT DOCD: CPT | Mod: S$GLB,,, | Performed by: NURSE PRACTITIONER

## 2021-05-05 PROCEDURE — 1126F PR PAIN SEVERITY QUANTIFIED, NO PAIN PRESENT: ICD-10-PCS | Mod: S$GLB,,, | Performed by: NURSE PRACTITIONER

## 2021-05-05 PROCEDURE — 99214 PR OFFICE/OUTPT VISIT, EST, LEVL IV, 30-39 MIN: ICD-10-PCS | Mod: S$GLB,,, | Performed by: NURSE PRACTITIONER

## 2021-05-05 PROCEDURE — 99214 OFFICE O/P EST MOD 30 MIN: CPT | Mod: S$GLB,,, | Performed by: NURSE PRACTITIONER

## 2021-05-05 PROCEDURE — 1159F PR MEDICATION LIST DOCUMENTED IN MEDICAL RECORD: ICD-10-PCS | Mod: S$GLB,,, | Performed by: NURSE PRACTITIONER

## 2021-05-05 PROCEDURE — 1126F AMNT PAIN NOTED NONE PRSNT: CPT | Mod: S$GLB,,, | Performed by: NURSE PRACTITIONER

## 2021-05-05 RX ORDER — AZITHROMYCIN 250 MG/1
TABLET, FILM COATED ORAL
COMMUNITY
End: 2021-06-14

## 2021-05-05 RX ORDER — MONTELUKAST SODIUM 10 MG/1
10 TABLET ORAL DAILY
COMMUNITY
Start: 2021-03-06 | End: 2021-06-14

## 2021-05-05 RX ORDER — FLUTICASONE PROPIONATE 50 MCG
SPRAY, SUSPENSION (ML) NASAL
COMMUNITY
End: 2021-06-14

## 2021-05-28 ENCOUNTER — OFFICE VISIT (OUTPATIENT)
Dept: URGENT CARE | Facility: CLINIC | Age: 81
End: 2021-05-28
Payer: MEDICARE

## 2021-05-28 VITALS
OXYGEN SATURATION: 97 % | BODY MASS INDEX: 29.45 KG/M2 | RESPIRATION RATE: 18 BRPM | DIASTOLIC BLOOD PRESSURE: 78 MMHG | HEART RATE: 83 BPM | SYSTOLIC BLOOD PRESSURE: 126 MMHG | HEIGHT: 61 IN | WEIGHT: 156 LBS | TEMPERATURE: 97 F

## 2021-05-28 DIAGNOSIS — Z76.0 MEDICATION REFILL: Primary | ICD-10-CM

## 2021-05-28 PROCEDURE — 99214 OFFICE O/P EST MOD 30 MIN: CPT | Mod: S$GLB,,, | Performed by: PHYSICIAN ASSISTANT

## 2021-05-28 PROCEDURE — 99214 PR OFFICE/OUTPT VISIT, EST, LEVL IV, 30-39 MIN: ICD-10-PCS | Mod: S$GLB,,, | Performed by: PHYSICIAN ASSISTANT

## 2021-05-28 RX ORDER — LISINOPRIL 20 MG/1
20 TABLET ORAL DAILY
Qty: 30 TABLET | Refills: 0 | Status: SHIPPED | OUTPATIENT
Start: 2021-05-28 | End: 2021-06-14 | Stop reason: SDUPTHER

## 2021-06-14 ENCOUNTER — OFFICE VISIT (OUTPATIENT)
Dept: FAMILY MEDICINE | Facility: CLINIC | Age: 81
End: 2021-06-14
Payer: MEDICARE

## 2021-06-14 VITALS
SYSTOLIC BLOOD PRESSURE: 112 MMHG | WEIGHT: 158.5 LBS | BODY MASS INDEX: 29.92 KG/M2 | HEIGHT: 61 IN | DIASTOLIC BLOOD PRESSURE: 80 MMHG

## 2021-06-14 DIAGNOSIS — M81.0 OSTEOPOROSIS, UNSPECIFIED OSTEOPOROSIS TYPE, UNSPECIFIED PATHOLOGICAL FRACTURE PRESENCE: ICD-10-CM

## 2021-06-14 DIAGNOSIS — E78.2 MIXED HYPERLIPIDEMIA: ICD-10-CM

## 2021-06-14 DIAGNOSIS — R63.5 WEIGHT GAIN: ICD-10-CM

## 2021-06-14 DIAGNOSIS — E78.5 DYSLIPIDEMIA: ICD-10-CM

## 2021-06-14 DIAGNOSIS — E55.9 VITAMIN D DEFICIENCY: ICD-10-CM

## 2021-06-14 DIAGNOSIS — Z00.00 MEDICARE ANNUAL WELLNESS VISIT, SUBSEQUENT: ICD-10-CM

## 2021-06-14 DIAGNOSIS — L65.9 ALOPECIA: ICD-10-CM

## 2021-06-14 DIAGNOSIS — R79.89 ABNORMAL CBC: ICD-10-CM

## 2021-06-14 DIAGNOSIS — I10 ESSENTIAL HYPERTENSION: Primary | ICD-10-CM

## 2021-06-14 DIAGNOSIS — J44.9 CHRONIC OBSTRUCTIVE PULMONARY DISEASE, UNSPECIFIED COPD TYPE: ICD-10-CM

## 2021-06-14 PROCEDURE — 1159F PR MEDICATION LIST DOCUMENTED IN MEDICAL RECORD: ICD-10-PCS | Mod: S$GLB,,, | Performed by: INTERNAL MEDICINE

## 2021-06-14 PROCEDURE — 99999 PR PBB SHADOW E&M-EST. PATIENT-LVL III: ICD-10-PCS | Mod: PBBFAC,,, | Performed by: INTERNAL MEDICINE

## 2021-06-14 PROCEDURE — 1126F PR PAIN SEVERITY QUANTIFIED, NO PAIN PRESENT: ICD-10-PCS | Mod: S$GLB,,, | Performed by: INTERNAL MEDICINE

## 2021-06-14 PROCEDURE — 3288F FALL RISK ASSESSMENT DOCD: CPT | Mod: S$GLB,,, | Performed by: INTERNAL MEDICINE

## 2021-06-14 PROCEDURE — 99204 PR OFFICE/OUTPT VISIT, NEW, LEVL IV, 45-59 MIN: ICD-10-PCS | Mod: S$GLB,,, | Performed by: INTERNAL MEDICINE

## 2021-06-14 PROCEDURE — 3079F PR MOST RECENT DIASTOLIC BLOOD PRESSURE 80-89 MM HG: ICD-10-PCS | Mod: S$GLB,,, | Performed by: INTERNAL MEDICINE

## 2021-06-14 PROCEDURE — 1101F PR PT FALLS ASSESS DOC 0-1 FALLS W/OUT INJ PAST YR: ICD-10-PCS | Mod: S$GLB,,, | Performed by: INTERNAL MEDICINE

## 2021-06-14 PROCEDURE — 3079F DIAST BP 80-89 MM HG: CPT | Mod: S$GLB,,, | Performed by: INTERNAL MEDICINE

## 2021-06-14 PROCEDURE — 3074F SYST BP LT 130 MM HG: CPT | Mod: S$GLB,,, | Performed by: INTERNAL MEDICINE

## 2021-06-14 PROCEDURE — 3288F PR FALLS RISK ASSESSMENT DOCUMENTED: ICD-10-PCS | Mod: S$GLB,,, | Performed by: INTERNAL MEDICINE

## 2021-06-14 PROCEDURE — 99999 PR PBB SHADOW E&M-EST. PATIENT-LVL III: CPT | Mod: PBBFAC,,, | Performed by: INTERNAL MEDICINE

## 2021-06-14 PROCEDURE — 1101F PT FALLS ASSESS-DOCD LE1/YR: CPT | Mod: S$GLB,,, | Performed by: INTERNAL MEDICINE

## 2021-06-14 PROCEDURE — 1159F MED LIST DOCD IN RCRD: CPT | Mod: S$GLB,,, | Performed by: INTERNAL MEDICINE

## 2021-06-14 PROCEDURE — 99204 OFFICE O/P NEW MOD 45 MIN: CPT | Mod: S$GLB,,, | Performed by: INTERNAL MEDICINE

## 2021-06-14 PROCEDURE — 3074F PR MOST RECENT SYSTOLIC BLOOD PRESSURE < 130 MM HG: ICD-10-PCS | Mod: S$GLB,,, | Performed by: INTERNAL MEDICINE

## 2021-06-14 PROCEDURE — 1126F AMNT PAIN NOTED NONE PRSNT: CPT | Mod: S$GLB,,, | Performed by: INTERNAL MEDICINE

## 2021-06-14 RX ORDER — ALENDRONATE SODIUM 70 MG/1
70 TABLET ORAL
Qty: 12 TABLET | Refills: 2 | Status: SHIPPED | OUTPATIENT
Start: 2021-06-14 | End: 2022-02-09 | Stop reason: SDUPTHER

## 2021-06-14 RX ORDER — LISINOPRIL 20 MG/1
20 TABLET ORAL DAILY
Qty: 90 TABLET | Refills: 2 | Status: SHIPPED | OUTPATIENT
Start: 2021-06-14 | End: 2022-02-14 | Stop reason: SDUPTHER

## 2021-06-16 ENCOUNTER — LAB VISIT (OUTPATIENT)
Dept: LAB | Facility: HOSPITAL | Age: 81
End: 2021-06-16
Attending: INTERNAL MEDICINE
Payer: MEDICARE

## 2021-06-16 DIAGNOSIS — E55.9 VITAMIN D DEFICIENCY: ICD-10-CM

## 2021-06-16 DIAGNOSIS — Z00.00 MEDICARE ANNUAL WELLNESS VISIT, SUBSEQUENT: ICD-10-CM

## 2021-06-16 DIAGNOSIS — R79.89 ABNORMAL CBC: ICD-10-CM

## 2021-06-16 DIAGNOSIS — M81.0 OSTEOPOROSIS, UNSPECIFIED OSTEOPOROSIS TYPE, UNSPECIFIED PATHOLOGICAL FRACTURE PRESENCE: ICD-10-CM

## 2021-06-16 DIAGNOSIS — E78.5 DYSLIPIDEMIA: ICD-10-CM

## 2021-06-16 DIAGNOSIS — R63.5 WEIGHT GAIN: ICD-10-CM

## 2021-06-16 DIAGNOSIS — I10 ESSENTIAL HYPERTENSION: ICD-10-CM

## 2021-06-16 LAB
25(OH)D3+25(OH)D2 SERPL-MCNC: 37 NG/ML (ref 30–96)
ALBUMIN SERPL BCP-MCNC: 3.8 G/DL (ref 3.5–5.2)
ALP SERPL-CCNC: 66 U/L (ref 55–135)
ALT SERPL W/O P-5'-P-CCNC: 23 U/L (ref 10–44)
ANION GAP SERPL CALC-SCNC: 11 MMOL/L (ref 8–16)
AST SERPL-CCNC: 21 U/L (ref 10–40)
BASOPHILS # BLD AUTO: 0.08 K/UL (ref 0–0.2)
BASOPHILS NFR BLD: 1 % (ref 0–1.9)
BILIRUB SERPL-MCNC: 0.5 MG/DL (ref 0.1–1)
BUN SERPL-MCNC: 20 MG/DL (ref 8–23)
CALCIUM SERPL-MCNC: 10 MG/DL (ref 8.7–10.5)
CHLORIDE SERPL-SCNC: 103 MMOL/L (ref 95–110)
CHOLEST SERPL-MCNC: 233 MG/DL (ref 120–199)
CHOLEST/HDLC SERPL: 4.2 {RATIO} (ref 2–5)
CO2 SERPL-SCNC: 27 MMOL/L (ref 23–29)
CREAT SERPL-MCNC: 0.9 MG/DL (ref 0.5–1.4)
DIFFERENTIAL METHOD: ABNORMAL
EOSINOPHIL # BLD AUTO: 0.1 K/UL (ref 0–0.5)
EOSINOPHIL NFR BLD: 1.8 % (ref 0–8)
ERYTHROCYTE [DISTWIDTH] IN BLOOD BY AUTOMATED COUNT: 13.2 % (ref 11.5–14.5)
EST. GFR  (AFRICAN AMERICAN): >60 ML/MIN/1.73 M^2
EST. GFR  (NON AFRICAN AMERICAN): >60 ML/MIN/1.73 M^2
GLUCOSE SERPL-MCNC: 96 MG/DL (ref 70–110)
HCT VFR BLD AUTO: 44.6 % (ref 37–48.5)
HDLC SERPL-MCNC: 55 MG/DL (ref 40–75)
HDLC SERPL: 23.6 % (ref 20–50)
HGB BLD-MCNC: 14 G/DL (ref 12–16)
IMM GRANULOCYTES # BLD AUTO: 0.05 K/UL (ref 0–0.04)
IMM GRANULOCYTES NFR BLD AUTO: 0.6 % (ref 0–0.5)
LDLC SERPL CALC-MCNC: 155.6 MG/DL (ref 63–159)
LYMPHOCYTES # BLD AUTO: 1.9 K/UL (ref 1–4.8)
LYMPHOCYTES NFR BLD: 24.7 % (ref 18–48)
MCH RBC QN AUTO: 28.4 PG (ref 27–31)
MCHC RBC AUTO-ENTMCNC: 31.4 G/DL (ref 32–36)
MCV RBC AUTO: 91 FL (ref 82–98)
MONOCYTES # BLD AUTO: 0.5 K/UL (ref 0.3–1)
MONOCYTES NFR BLD: 6.3 % (ref 4–15)
NEUTROPHILS # BLD AUTO: 5.1 K/UL (ref 1.8–7.7)
NEUTROPHILS NFR BLD: 65.6 % (ref 38–73)
NONHDLC SERPL-MCNC: 178 MG/DL
NRBC BLD-RTO: 0 /100 WBC
PLATELET # BLD AUTO: 314 K/UL (ref 150–450)
PMV BLD AUTO: 11.4 FL (ref 9.2–12.9)
POTASSIUM SERPL-SCNC: 5.1 MMOL/L (ref 3.5–5.1)
PROT SERPL-MCNC: 6.8 G/DL (ref 6–8.4)
RBC # BLD AUTO: 4.93 M/UL (ref 4–5.4)
SODIUM SERPL-SCNC: 141 MMOL/L (ref 136–145)
T4 FREE SERPL-MCNC: 0.97 NG/DL (ref 0.71–1.51)
TRIGL SERPL-MCNC: 112 MG/DL (ref 30–150)
TSH SERPL DL<=0.005 MIU/L-ACNC: 1.4 UIU/ML (ref 0.4–4)
WBC # BLD AUTO: 7.78 K/UL (ref 3.9–12.7)

## 2021-06-16 PROCEDURE — 84443 ASSAY THYROID STIM HORMONE: CPT | Performed by: INTERNAL MEDICINE

## 2021-06-16 PROCEDURE — 80053 COMPREHEN METABOLIC PANEL: CPT | Performed by: INTERNAL MEDICINE

## 2021-06-16 PROCEDURE — 85025 COMPLETE CBC W/AUTO DIFF WBC: CPT | Performed by: INTERNAL MEDICINE

## 2021-06-16 PROCEDURE — 80061 LIPID PANEL: CPT | Performed by: INTERNAL MEDICINE

## 2021-06-16 PROCEDURE — 82306 VITAMIN D 25 HYDROXY: CPT | Performed by: INTERNAL MEDICINE

## 2021-06-16 PROCEDURE — 36415 COLL VENOUS BLD VENIPUNCTURE: CPT | Mod: PN | Performed by: INTERNAL MEDICINE

## 2021-06-16 PROCEDURE — 84439 ASSAY OF FREE THYROXINE: CPT | Performed by: INTERNAL MEDICINE

## 2021-07-28 ENCOUNTER — HOSPITAL ENCOUNTER (OUTPATIENT)
Dept: RADIOLOGY | Facility: HOSPITAL | Age: 81
Discharge: HOME | End: 2021-07-28
Attending: NURSE PRACTITIONER
Payer: MEDICARE

## 2021-07-28 DIAGNOSIS — R05.9 COUGH: ICD-10-CM

## 2021-07-28 PROCEDURE — 71250 CT CHEST WITHOUT CONTRAST: ICD-10-PCS | Mod: 26,,, | Performed by: RADIOLOGY

## 2021-07-28 PROCEDURE — 71250 CT THORAX DX C-: CPT | Mod: TC

## 2021-07-28 PROCEDURE — 71250 CT THORAX DX C-: CPT | Mod: 26,,, | Performed by: RADIOLOGY

## 2021-08-03 ENCOUNTER — PATIENT MESSAGE (OUTPATIENT)
Dept: PULMONOLOGY | Facility: CLINIC | Age: 81
End: 2021-08-03

## 2021-08-03 ENCOUNTER — TELEPHONE (OUTPATIENT)
Dept: PULMONOLOGY | Facility: CLINIC | Age: 81
End: 2021-08-03

## 2021-08-09 ENCOUNTER — PATIENT MESSAGE (OUTPATIENT)
Dept: PULMONOLOGY | Facility: CLINIC | Age: 81
End: 2021-08-09

## 2021-08-09 ENCOUNTER — TELEPHONE (OUTPATIENT)
Dept: PULMONOLOGY | Facility: CLINIC | Age: 81
End: 2021-08-09

## 2021-09-03 ENCOUNTER — TELEPHONE (OUTPATIENT)
Dept: PULMONOLOGY | Facility: CLINIC | Age: 81
End: 2021-09-03

## 2021-09-23 ENCOUNTER — TELEPHONE (OUTPATIENT)
Dept: PULMONOLOGY | Facility: CLINIC | Age: 81
End: 2021-09-23

## 2021-10-13 ENCOUNTER — OFFICE VISIT (OUTPATIENT)
Dept: PULMONOLOGY | Facility: CLINIC | Age: 81
End: 2021-10-13
Payer: MEDICARE

## 2021-10-13 VITALS
DIASTOLIC BLOOD PRESSURE: 88 MMHG | BODY MASS INDEX: 30.08 KG/M2 | TEMPERATURE: 98 F | SYSTOLIC BLOOD PRESSURE: 133 MMHG | HEART RATE: 70 BPM | HEIGHT: 61 IN | WEIGHT: 159.31 LBS | OXYGEN SATURATION: 96 %

## 2021-10-13 DIAGNOSIS — J43.1 PANLOBULAR EMPHYSEMA: ICD-10-CM

## 2021-10-13 DIAGNOSIS — R91.1 SOLITARY LUNG NODULE: Primary | ICD-10-CM

## 2021-10-13 PROCEDURE — 1101F PT FALLS ASSESS-DOCD LE1/YR: CPT | Mod: S$GLB,,, | Performed by: NURSE PRACTITIONER

## 2021-10-13 PROCEDURE — 1101F PR PT FALLS ASSESS DOC 0-1 FALLS W/OUT INJ PAST YR: ICD-10-PCS | Mod: S$GLB,,, | Performed by: NURSE PRACTITIONER

## 2021-10-13 PROCEDURE — 3075F PR MOST RECENT SYSTOLIC BLOOD PRESS GE 130-139MM HG: ICD-10-PCS | Mod: S$GLB,,, | Performed by: NURSE PRACTITIONER

## 2021-10-13 PROCEDURE — 99999 PR PBB SHADOW E&M-EST. PATIENT-LVL IV: CPT | Mod: PBBFAC,,, | Performed by: NURSE PRACTITIONER

## 2021-10-13 PROCEDURE — 99214 PR OFFICE/OUTPT VISIT, EST, LEVL IV, 30-39 MIN: ICD-10-PCS | Mod: S$GLB,,, | Performed by: NURSE PRACTITIONER

## 2021-10-13 PROCEDURE — 3079F PR MOST RECENT DIASTOLIC BLOOD PRESSURE 80-89 MM HG: ICD-10-PCS | Mod: S$GLB,,, | Performed by: NURSE PRACTITIONER

## 2021-10-13 PROCEDURE — 3288F FALL RISK ASSESSMENT DOCD: CPT | Mod: S$GLB,,, | Performed by: NURSE PRACTITIONER

## 2021-10-13 PROCEDURE — 1159F MED LIST DOCD IN RCRD: CPT | Mod: S$GLB,,, | Performed by: NURSE PRACTITIONER

## 2021-10-13 PROCEDURE — 1126F AMNT PAIN NOTED NONE PRSNT: CPT | Mod: S$GLB,,, | Performed by: NURSE PRACTITIONER

## 2021-10-13 PROCEDURE — 99214 OFFICE O/P EST MOD 30 MIN: CPT | Mod: S$GLB,,, | Performed by: NURSE PRACTITIONER

## 2021-10-13 PROCEDURE — 3075F SYST BP GE 130 - 139MM HG: CPT | Mod: S$GLB,,, | Performed by: NURSE PRACTITIONER

## 2021-10-13 PROCEDURE — 3079F DIAST BP 80-89 MM HG: CPT | Mod: S$GLB,,, | Performed by: NURSE PRACTITIONER

## 2021-10-13 PROCEDURE — 99999 PR PBB SHADOW E&M-EST. PATIENT-LVL IV: ICD-10-PCS | Mod: PBBFAC,,, | Performed by: NURSE PRACTITIONER

## 2021-10-13 PROCEDURE — 3288F PR FALLS RISK ASSESSMENT DOCUMENTED: ICD-10-PCS | Mod: S$GLB,,, | Performed by: NURSE PRACTITIONER

## 2021-10-13 PROCEDURE — 1159F PR MEDICATION LIST DOCUMENTED IN MEDICAL RECORD: ICD-10-PCS | Mod: S$GLB,,, | Performed by: NURSE PRACTITIONER

## 2021-10-13 PROCEDURE — 1126F PR PAIN SEVERITY QUANTIFIED, NO PAIN PRESENT: ICD-10-PCS | Mod: S$GLB,,, | Performed by: NURSE PRACTITIONER

## 2021-12-20 DIAGNOSIS — R05.9 COUGH: ICD-10-CM

## 2021-12-20 RX ORDER — MONTELUKAST SODIUM 10 MG/1
10 TABLET ORAL NIGHTLY
Qty: 90 TABLET | Refills: 3 | Status: SHIPPED | OUTPATIENT
Start: 2021-12-20 | End: 2022-01-19

## 2022-01-19 DIAGNOSIS — J44.89 COPD WITH ASTHMA: ICD-10-CM

## 2022-01-19 RX ORDER — FLUTICASONE FUROATE, UMECLIDINIUM BROMIDE AND VILANTEROL TRIFENATATE 200; 62.5; 25 UG/1; UG/1; UG/1
1 POWDER RESPIRATORY (INHALATION) DAILY
Qty: 60 EACH | Refills: 11 | Status: SHIPPED | OUTPATIENT
Start: 2022-01-19 | End: 2022-02-11 | Stop reason: SDUPTHER

## 2022-01-26 ENCOUNTER — LAB VISIT (OUTPATIENT)
Dept: LAB | Facility: HOSPITAL | Age: 82
End: 2022-01-26
Payer: MEDICARE

## 2022-01-26 DIAGNOSIS — E78.2 MIXED HYPERLIPIDEMIA: ICD-10-CM

## 2022-01-26 DIAGNOSIS — Z00.00 MEDICARE ANNUAL WELLNESS VISIT, SUBSEQUENT: ICD-10-CM

## 2022-01-26 DIAGNOSIS — R79.89 ABNORMAL CBC: ICD-10-CM

## 2022-01-26 LAB
ALBUMIN SERPL BCP-MCNC: 4 G/DL (ref 3.5–5.2)
ALP SERPL-CCNC: 68 U/L (ref 55–135)
ALT SERPL W/O P-5'-P-CCNC: 31 U/L (ref 10–44)
ANION GAP SERPL CALC-SCNC: 10 MMOL/L (ref 8–16)
AST SERPL-CCNC: 26 U/L (ref 10–40)
BASOPHILS # BLD AUTO: 0.08 K/UL (ref 0–0.2)
BASOPHILS NFR BLD: 0.9 % (ref 0–1.9)
BILIRUB SERPL-MCNC: 0.5 MG/DL (ref 0.1–1)
BUN SERPL-MCNC: 25 MG/DL (ref 8–23)
CALCIUM SERPL-MCNC: 10 MG/DL (ref 8.7–10.5)
CHLORIDE SERPL-SCNC: 102 MMOL/L (ref 95–110)
CHOLEST SERPL-MCNC: 256 MG/DL (ref 120–199)
CHOLEST/HDLC SERPL: 3.9 {RATIO} (ref 2–5)
CO2 SERPL-SCNC: 27 MMOL/L (ref 23–29)
CREAT SERPL-MCNC: 1 MG/DL (ref 0.5–1.4)
DIFFERENTIAL METHOD: ABNORMAL
EOSINOPHIL # BLD AUTO: 0.2 K/UL (ref 0–0.5)
EOSINOPHIL NFR BLD: 2.2 % (ref 0–8)
ERYTHROCYTE [DISTWIDTH] IN BLOOD BY AUTOMATED COUNT: 13.8 % (ref 11.5–14.5)
EST. GFR  (AFRICAN AMERICAN): >60 ML/MIN/1.73 M^2
EST. GFR  (NON AFRICAN AMERICAN): 53.3 ML/MIN/1.73 M^2
GLUCOSE SERPL-MCNC: 90 MG/DL (ref 70–110)
HCT VFR BLD AUTO: 45.5 % (ref 37–48.5)
HDLC SERPL-MCNC: 65 MG/DL (ref 40–75)
HDLC SERPL: 25.4 % (ref 20–50)
HGB BLD-MCNC: 13.9 G/DL (ref 12–16)
IMM GRANULOCYTES # BLD AUTO: 0.03 K/UL (ref 0–0.04)
IMM GRANULOCYTES NFR BLD AUTO: 0.4 % (ref 0–0.5)
LDLC SERPL CALC-MCNC: 169.6 MG/DL (ref 63–159)
LYMPHOCYTES # BLD AUTO: 2.1 K/UL (ref 1–4.8)
LYMPHOCYTES NFR BLD: 24.7 % (ref 18–48)
MCH RBC QN AUTO: 27.6 PG (ref 27–31)
MCHC RBC AUTO-ENTMCNC: 30.5 G/DL (ref 32–36)
MCV RBC AUTO: 90 FL (ref 82–98)
MONOCYTES # BLD AUTO: 0.7 K/UL (ref 0.3–1)
MONOCYTES NFR BLD: 7.9 % (ref 4–15)
NEUTROPHILS # BLD AUTO: 5.4 K/UL (ref 1.8–7.7)
NEUTROPHILS NFR BLD: 63.9 % (ref 38–73)
NONHDLC SERPL-MCNC: 191 MG/DL
NRBC BLD-RTO: 0 /100 WBC
PLATELET # BLD AUTO: 304 K/UL (ref 150–450)
PMV BLD AUTO: 11.7 FL (ref 9.2–12.9)
POTASSIUM SERPL-SCNC: 5 MMOL/L (ref 3.5–5.1)
PROT SERPL-MCNC: 7.3 G/DL (ref 6–8.4)
RBC # BLD AUTO: 5.04 M/UL (ref 4–5.4)
SODIUM SERPL-SCNC: 139 MMOL/L (ref 136–145)
TRIGL SERPL-MCNC: 107 MG/DL (ref 30–150)
WBC # BLD AUTO: 8.45 K/UL (ref 3.9–12.7)

## 2022-01-26 PROCEDURE — 85025 COMPLETE CBC W/AUTO DIFF WBC: CPT | Performed by: INTERNAL MEDICINE

## 2022-01-26 PROCEDURE — 80053 COMPREHEN METABOLIC PANEL: CPT | Performed by: INTERNAL MEDICINE

## 2022-01-26 PROCEDURE — 80061 LIPID PANEL: CPT | Performed by: INTERNAL MEDICINE

## 2022-01-26 PROCEDURE — 36415 COLL VENOUS BLD VENIPUNCTURE: CPT | Mod: PN | Performed by: INTERNAL MEDICINE

## 2022-02-02 ENCOUNTER — HOSPITAL ENCOUNTER (OUTPATIENT)
Dept: RADIOLOGY | Facility: HOSPITAL | Age: 82
Discharge: HOME | End: 2022-02-02
Attending: NURSE PRACTITIONER
Payer: MEDICARE

## 2022-02-02 DIAGNOSIS — R91.1 SOLITARY LUNG NODULE: ICD-10-CM

## 2022-02-02 PROCEDURE — 71250 CT THORAX DX C-: CPT | Mod: TC

## 2022-02-02 PROCEDURE — 71250 CT CHEST WITHOUT CONTRAST: ICD-10-PCS | Mod: 26,,, | Performed by: RADIOLOGY

## 2022-02-02 PROCEDURE — 71250 CT THORAX DX C-: CPT | Mod: 26,,, | Performed by: RADIOLOGY

## 2022-02-04 ENCOUNTER — PATIENT MESSAGE (OUTPATIENT)
Dept: PULMONOLOGY | Facility: CLINIC | Age: 82
End: 2022-02-04
Payer: MEDICARE

## 2022-02-09 ENCOUNTER — TELEPHONE (OUTPATIENT)
Dept: FAMILY MEDICINE | Facility: CLINIC | Age: 82
End: 2022-02-09
Payer: MEDICARE

## 2022-02-09 ENCOUNTER — OFFICE VISIT (OUTPATIENT)
Dept: FAMILY MEDICINE | Facility: CLINIC | Age: 82
End: 2022-02-09
Payer: MEDICARE

## 2022-02-09 ENCOUNTER — LAB VISIT (OUTPATIENT)
Dept: LAB | Facility: HOSPITAL | Age: 82
End: 2022-02-09
Attending: INTERNAL MEDICINE
Payer: MEDICARE

## 2022-02-09 VITALS
BODY MASS INDEX: 29.51 KG/M2 | DIASTOLIC BLOOD PRESSURE: 80 MMHG | SYSTOLIC BLOOD PRESSURE: 110 MMHG | WEIGHT: 156.31 LBS | HEIGHT: 61 IN

## 2022-02-09 DIAGNOSIS — J43.2 CENTRILOBULAR EMPHYSEMA: ICD-10-CM

## 2022-02-09 DIAGNOSIS — Z85.828 HISTORY OF BASAL CELL CANCER: ICD-10-CM

## 2022-02-09 DIAGNOSIS — E04.2 MULTIPLE THYROID NODULES: ICD-10-CM

## 2022-02-09 DIAGNOSIS — E78.5 HYPERLIPIDEMIA, UNSPECIFIED HYPERLIPIDEMIA TYPE: ICD-10-CM

## 2022-02-09 DIAGNOSIS — M81.0 AGE RELATED OSTEOPOROSIS, UNSPECIFIED PATHOLOGICAL FRACTURE PRESENCE: ICD-10-CM

## 2022-02-09 DIAGNOSIS — Z00.00 MEDICARE ANNUAL WELLNESS VISIT, SUBSEQUENT: ICD-10-CM

## 2022-02-09 DIAGNOSIS — I25.10 CORONARY ARTERY DISEASE INVOLVING NATIVE CORONARY ARTERY OF NATIVE HEART WITHOUT ANGINA PECTORIS: Primary | ICD-10-CM

## 2022-02-09 DIAGNOSIS — L98.9 SKIN LESION: ICD-10-CM

## 2022-02-09 DIAGNOSIS — I77.810 ASCENDING AORTA DILATATION: ICD-10-CM

## 2022-02-09 DIAGNOSIS — Z78.0 MENOPAUSE: ICD-10-CM

## 2022-02-09 LAB
T4 FREE SERPL-MCNC: 0.96 NG/DL (ref 0.71–1.51)
TSH SERPL DL<=0.005 MIU/L-ACNC: 1.06 UIU/ML (ref 0.4–4)

## 2022-02-09 PROCEDURE — 84439 ASSAY OF FREE THYROXINE: CPT | Performed by: INTERNAL MEDICINE

## 2022-02-09 PROCEDURE — 84443 ASSAY THYROID STIM HORMONE: CPT | Performed by: INTERNAL MEDICINE

## 2022-02-09 PROCEDURE — 99214 PR OFFICE/OUTPT VISIT, EST, LEVL IV, 30-39 MIN: ICD-10-PCS | Mod: S$PBB,,, | Performed by: INTERNAL MEDICINE

## 2022-02-09 PROCEDURE — 99999 PR PBB SHADOW E&M-EST. PATIENT-LVL IV: ICD-10-PCS | Mod: PBBFAC,,, | Performed by: INTERNAL MEDICINE

## 2022-02-09 PROCEDURE — 99214 OFFICE O/P EST MOD 30 MIN: CPT | Mod: S$PBB,,, | Performed by: INTERNAL MEDICINE

## 2022-02-09 PROCEDURE — 86376 MICROSOMAL ANTIBODY EACH: CPT | Performed by: INTERNAL MEDICINE

## 2022-02-09 PROCEDURE — 99214 OFFICE O/P EST MOD 30 MIN: CPT | Mod: PBBFAC,PN | Performed by: INTERNAL MEDICINE

## 2022-02-09 PROCEDURE — 36415 COLL VENOUS BLD VENIPUNCTURE: CPT | Mod: PN | Performed by: INTERNAL MEDICINE

## 2022-02-09 PROCEDURE — 99999 PR PBB SHADOW E&M-EST. PATIENT-LVL IV: CPT | Mod: PBBFAC,,, | Performed by: INTERNAL MEDICINE

## 2022-02-09 RX ORDER — ALENDRONATE SODIUM 70 MG/1
70 TABLET ORAL
Qty: 12 TABLET | Refills: 3 | Status: SHIPPED | OUTPATIENT
Start: 2022-02-09 | End: 2023-01-23

## 2022-02-09 RX ORDER — EZETIMIBE 10 MG/1
10 TABLET ORAL DAILY
Qty: 90 TABLET | Refills: 3 | Status: SHIPPED | OUTPATIENT
Start: 2022-02-09 | End: 2022-12-01

## 2022-02-09 NOTE — PROGRESS NOTES
"Subjective:    here to establish moved recently    Patient ID: Gayathri Mack is a 81 y.o. female.    Chief Complaint: Annual Exam  Gyn: no defers  MMG:  No defers  Dexa: 2020 get old records//[place]   Colonoscopy:  never   Immunizations: Flu:  Yes Tdap:  Get old record Pneumovax: 2018 Prevnar 13:  2014 Shingles: yes Covid: yes   Smoker: former - quit 2020     HPI     Has skin lesion left upper forehead - like referral for derm hx of basal cell.   Reviewed CT chest found to have thyroid nodule will need US -and labs done.   ascending thoracic aorta is ectatic at 4.3 cm //calcification of the thoracic aorta and coronary arteries: will refer to cardio- never on chol meds.     PULM:   COPD emphysema:  Stop smoking 2020// [facility] [place]./  PFT FEV1 60 Trilogy inhaler. 2022: CT + right apex nodule.     Cochlear implant lift -- hearing loss since child   Hypertension:  Controlled Rx lisinopril 20  Osteoporosis: last testing 2020  Fosamax 70  History of alopecia wears a wig    Review of Systems:  Review of Systems   Constitutional: Negative for appetite change.   HENT: Negative for nosebleeds.    Eyes: Negative for pain.   Respiratory: Negative for choking.    Cardiovascular: Negative for chest pain.   Gastrointestinal: Negative for blood in stool.   Genitourinary: Negative for hematuria.   Musculoskeletal: Negative for joint swelling.   Skin: Negative for pallor.   Neurological: Negative for facial asymmetry.   Hematological: Does not bruise/bleed easily.   Psychiatric/Behavioral: Negative for confusion.       Objective:     Vitals:    02/09/22 1246   BP: 110/80   BP Location: Left arm   Weight: 70.9 kg (156 lb 4.9 oz)   Height: 5' 1" (1.549 m)          Physical Exam  Vitals reviewed.   Constitutional:       Appearance: Normal appearance.   HENT:      Head: Normocephalic and atraumatic.        Comments: Forehead skin lesion      Ears:      Comments: Hard of hearing     Mouth/Throat:      Pharynx: Oropharynx is clear. "   Eyes:      Extraocular Movements: Extraocular movements intact.      Conjunctiva/sclera: Conjunctivae normal.      Pupils: Pupils are equal, round, and reactive to light.   Cardiovascular:      Rate and Rhythm: Normal rate and regular rhythm.      Heart sounds: Normal heart sounds.   Pulmonary:      Effort: Pulmonary effort is normal.      Breath sounds: Normal breath sounds.   Abdominal:      General: Bowel sounds are normal.      Palpations: Abdomen is soft.   Musculoskeletal:         General: Normal range of motion.      Cervical back: Normal range of motion and neck supple.   Skin:     General: Skin is warm and dry.   Neurological:      General: No focal deficit present.      Mental Status: She is alert and oriented to person, place, and time.   Psychiatric:         Mood and Affect: Mood normal.         Medication List with Changes/Refills   New Medications    EZETIMIBE (ZETIA) 10 MG TABLET    Take 1 tablet (10 mg total) by mouth once daily.   Current Medications    AZELASTINE (ASTELIN) 137 MCG (0.1 %) NASAL SPRAY    1 spray (137 mcg total) by Nasal route 2 (two) times daily.    FLUTICASONE-UMECLIDIN-VILANTER (TRELEGY ELLIPTA) 200-62.5-25 MCG INHALER    Inhale 1 puff into the lungs once daily.    LISINOPRIL (PRINIVIL,ZESTRIL) 20 MG TABLET    Take 1 tablet (20 mg total) by mouth once daily.    MELATONIN 300 MCG TAB    Take 300 mcg by mouth nightly as needed (insomnia).    VENTOLIN HFA 90 MCG/ACTUATION INHALER    INHALE 2 PUFFS INTO THE LUNGS EVERY 6 HOURS AS NEEDED FOR WHEEZING   Changed and/or Refilled Medications    Modified Medication Previous Medication    ALENDRONATE (FOSAMAX) 70 MG TABLET alendronate (FOSAMAX) 70 MG tablet       Take 1 tablet (70 mg total) by mouth every 7 days.    Take 1 tablet (70 mg total) by mouth every 7 days.   Discontinued Medications    ALBUTEROL-IPRATROPIUM (DUO-NEB) 2.5 MG-0.5 MG/3 ML NEBULIZER SOLUTION    Take 3 mLs by nebulization every 6 (six) hours as needed for Wheezing.  Rescue    BENZONATATE (TESSALON PERLES) 100 MG CAPSULE    Take 2 capsules (200 mg total) by mouth 3 (three) times daily as needed for Cough.       Assessment & Plan:  1. Coronary artery disease involving native coronary artery of native heart without angina pectoris  - Ambulatory referral/consult to Cardiology; Future    2. Skin lesion  - Ambulatory referral/consult to Dermatology; Future    3. Centrilobular emphysema    4. Multiple thyroid nodules  - US Thyroid; Future  - TSH; Future  - T4, Free; Future  - Thyroid Peroxidase Antibody; Future  - Anti-Thyroglobulin Antibody; Future    5. Hyperlipidemia, unspecified hyperlipidemia type  - Lipid Panel; Future  - Comprehensive Metabolic Panel; Future    6. Ascending aorta dilatation  - Ambulatory referral/consult to Cardiology; Future    7. History of basal cell cancer  - Ambulatory referral/consult to Dermatology; Future    8. Menopause  - DXA Bone Density Spine And Hip; Future    9. Age related osteoporosis, unspecified pathological fracture presence  - DXA Bone Density Spine And Hip; Future    10. Medicare annual wellness visit, subsequent  - Lipid Panel; Future  - Comprehensive Metabolic Panel; Future     Coronary artery disease involving native coronary artery of native heart without angina pectoris  -     Ambulatory referral/consult to Cardiology; Future; Expected date: 02/16/2022    Skin lesion  -     Ambulatory referral/consult to Dermatology; Future; Expected date: 02/16/2022    Centrilobular emphysema    Multiple thyroid nodules  -     US Thyroid; Future; Expected date: 02/09/2022  -     TSH; Future; Expected date: 02/09/2022  -     T4, Free; Future; Expected date: 02/09/2022  -     Thyroid Peroxidase Antibody; Future; Expected date: 02/09/2022  -     Anti-Thyroglobulin Antibody; Future; Expected date: 02/09/2022    Hyperlipidemia, unspecified hyperlipidemia type  -     Lipid Panel; Future; Expected date: 02/09/2022  -     Comprehensive Metabolic Panel;  Future; Expected date: 02/09/2022    Ascending aorta dilatation  -     Ambulatory referral/consult to Cardiology; Future; Expected date: 02/16/2022    History of basal cell cancer  -     Ambulatory referral/consult to Dermatology; Future; Expected date: 02/16/2022    Menopause  -     DXA Bone Density Spine And Hip; Future; Expected date: 02/09/2022    Age related osteoporosis, unspecified pathological fracture presence  -     DXA Bone Density Spine And Hip; Future; Expected date: 02/09/2022    Medicare annual wellness visit, subsequent  -     Lipid Panel; Future; Expected date: 02/09/2022  -     Comprehensive Metabolic Panel; Future; Expected date: 02/09/2022    Other orders  -     alendronate (FOSAMAX) 70 MG tablet; Take 1 tablet (70 mg total) by mouth every 7 days.  Dispense: 12 tablet; Refill: 3  -     ezetimibe (ZETIA) 10 mg tablet; Take 1 tablet (10 mg total) by mouth once daily.  Dispense: 90 tablet; Refill: 3        Continue to work on regular exercise, maintain healthy weight, balanced diet. Avoid unhealthy habits: smoking, excessive alcohol intake.

## 2022-02-10 LAB — THYROPEROXIDASE IGG SERPL-ACNC: <6 IU/ML

## 2022-02-11 DIAGNOSIS — J44.89 COPD WITH ASTHMA: ICD-10-CM

## 2022-02-11 PROBLEM — I77.810 ASCENDING AORTA DILATATION: Status: ACTIVE | Noted: 2022-02-11

## 2022-02-11 PROBLEM — E04.2 MULTIPLE THYROID NODULES: Status: ACTIVE | Noted: 2022-02-11

## 2022-02-11 RX ORDER — FLUTICASONE FUROATE, UMECLIDINIUM BROMIDE AND VILANTEROL TRIFENATATE 200; 62.5; 25 UG/1; UG/1; UG/1
1 POWDER RESPIRATORY (INHALATION) DAILY
Qty: 60 EACH | Refills: 11 | Status: SHIPPED | OUTPATIENT
Start: 2022-02-11 | End: 2022-11-07 | Stop reason: SDUPTHER

## 2022-02-11 NOTE — TELEPHONE ENCOUNTER
Sent message to provider.     ----- Message from Afshin Duong Patient Care Assistant sent at 2/11/2022  9:11 AM CST -----  Contact: Pt  Type: Needs Medical Advice    Who Called: Pt  Best Call Back Number: [phone]    Inquiry/Question: Pt is calling to provide the fax number for [facility] [phone]. Pt would like to have her prescriptions sent to this mail in pharmacy. Please call back and advise Thank you~

## 2022-02-14 RX ORDER — LISINOPRIL 20 MG/1
20 TABLET ORAL DAILY
Qty: 90 TABLET | Refills: 2 | Status: SHIPPED | OUTPATIENT
Start: 2022-02-14 | End: 2022-10-05

## 2022-02-14 NOTE — TELEPHONE ENCOUNTER
No new care gaps identified.  Powered by [employer]. Reference number: [id number].   2/14/2022 8:24:35 AM CST

## 2022-02-21 ENCOUNTER — HOSPITAL ENCOUNTER (OUTPATIENT)
Dept: RADIOLOGY | Facility: HOSPITAL | Age: 82
Discharge: HOME | End: 2022-02-21
Attending: INTERNAL MEDICINE
Payer: MEDICARE

## 2022-02-21 DIAGNOSIS — E04.2 MULTIPLE THYROID NODULES: ICD-10-CM

## 2022-02-21 PROCEDURE — 76536 US EXAM OF HEAD AND NECK: CPT | Mod: TC,PO

## 2022-02-23 ENCOUNTER — HOSPITAL ENCOUNTER (OUTPATIENT)
Dept: RADIOLOGY | Facility: HOSPITAL | Age: 82
Discharge: HOME | End: 2022-02-23
Attending: INTERNAL MEDICINE
Payer: MEDICARE

## 2022-02-23 DIAGNOSIS — Z78.0 MENOPAUSE: ICD-10-CM

## 2022-02-23 DIAGNOSIS — M81.0 AGE RELATED OSTEOPOROSIS, UNSPECIFIED PATHOLOGICAL FRACTURE PRESENCE: ICD-10-CM

## 2022-02-23 PROCEDURE — 77080 DXA BONE DENSITY AXIAL: CPT | Mod: TC,PO

## 2022-02-24 DIAGNOSIS — E04.2 MULTIPLE THYROID NODULES: Primary | ICD-10-CM

## 2022-04-12 ENCOUNTER — TELEPHONE (OUTPATIENT)
Dept: CARDIOLOGY | Facility: CLINIC | Age: 82
End: 2022-04-12
Payer: MEDICARE

## 2022-04-18 ENCOUNTER — TELEPHONE (OUTPATIENT)
Dept: CARDIOLOGY | Facility: CLINIC | Age: 82
End: 2022-04-18

## 2022-04-18 NOTE — TELEPHONE ENCOUNTER
----- Message from Allie Barrow sent at 4/18/2022 10:27 AM CDT -----  Regarding: reschedule appt  Contact: pt  Pt requesting to reschedule Appointment    Please call    Phone [phone]

## 2022-04-19 NOTE — TELEPHONE ENCOUNTER
Pt states she has a large Aorta that isn't  Currently causing her any problems. This is a new pt I informed her we currently have nothing until June which I cant currently schedule. Suggested to follow up with pcp and I would send a recall letter.

## 2022-04-22 ENCOUNTER — OFFICE VISIT (OUTPATIENT)
Dept: ENDOCRINOLOGY | Facility: CLINIC | Age: 82
End: 2022-04-22
Payer: MEDICARE

## 2022-04-22 ENCOUNTER — LAB VISIT (OUTPATIENT)
Dept: LAB | Facility: HOSPITAL | Age: 82
End: 2022-04-22
Attending: PHYSICIAN ASSISTANT
Payer: MEDICARE

## 2022-04-22 VITALS
DIASTOLIC BLOOD PRESSURE: 86 MMHG | HEIGHT: 61 IN | HEART RATE: 112 BPM | SYSTOLIC BLOOD PRESSURE: 126 MMHG | TEMPERATURE: 98 F | WEIGHT: 158.75 LBS | BODY MASS INDEX: 29.97 KG/M2 | OXYGEN SATURATION: 96 %

## 2022-04-22 DIAGNOSIS — E04.2 MULTIPLE THYROID NODULES: ICD-10-CM

## 2022-04-22 DIAGNOSIS — E04.2 MULTIPLE THYROID NODULES: Primary | ICD-10-CM

## 2022-04-22 DIAGNOSIS — E55.9 HYPOVITAMINOSIS D: ICD-10-CM

## 2022-04-22 DIAGNOSIS — M81.0 OSTEOPOROSIS, UNSPECIFIED OSTEOPOROSIS TYPE, UNSPECIFIED PATHOLOGICAL FRACTURE PRESENCE: ICD-10-CM

## 2022-04-22 LAB
25(OH)D3+25(OH)D2 SERPL-MCNC: 56 NG/ML (ref 30–96)
CA-I BLDV-SCNC: 1.26 MMOL/L (ref 1.06–1.42)
PTH-INTACT SERPL-MCNC: 121.5 PG/ML (ref 9–77)

## 2022-04-22 PROCEDURE — 83970 ASSAY OF PARATHORMONE: CPT | Performed by: PHYSICIAN ASSISTANT

## 2022-04-22 PROCEDURE — 99203 OFFICE O/P NEW LOW 30 MIN: CPT | Mod: S$PBB,,, | Performed by: PHYSICIAN ASSISTANT

## 2022-04-22 PROCEDURE — 84165 PATHOLOGIST INTERPRETATION SPE: ICD-10-PCS | Mod: 26,,, | Performed by: PATHOLOGY

## 2022-04-22 PROCEDURE — 99999 PR PBB SHADOW E&M-EST. PATIENT-LVL V: ICD-10-PCS | Mod: PBBFAC,,, | Performed by: PHYSICIAN ASSISTANT

## 2022-04-22 PROCEDURE — 82306 VITAMIN D 25 HYDROXY: CPT | Performed by: PHYSICIAN ASSISTANT

## 2022-04-22 PROCEDURE — 82330 ASSAY OF CALCIUM: CPT | Performed by: PHYSICIAN ASSISTANT

## 2022-04-22 PROCEDURE — 99203 PR OFFICE/OUTPT VISIT, NEW, LEVL III, 30-44 MIN: ICD-10-PCS | Mod: S$PBB,,, | Performed by: PHYSICIAN ASSISTANT

## 2022-04-22 PROCEDURE — 84165 PROTEIN E-PHORESIS SERUM: CPT | Performed by: PHYSICIAN ASSISTANT

## 2022-04-22 PROCEDURE — 99215 OFFICE O/P EST HI 40 MIN: CPT | Mod: PBBFAC,PO | Performed by: PHYSICIAN ASSISTANT

## 2022-04-22 PROCEDURE — 86800 THYROGLOBULIN ANTIBODY: CPT | Performed by: PHYSICIAN ASSISTANT

## 2022-04-22 PROCEDURE — 84165 PROTEIN E-PHORESIS SERUM: CPT | Mod: 26,,, | Performed by: PATHOLOGY

## 2022-04-22 PROCEDURE — 99999 PR PBB SHADOW E&M-EST. PATIENT-LVL V: CPT | Mod: PBBFAC,,, | Performed by: PHYSICIAN ASSISTANT

## 2022-04-22 PROCEDURE — 36415 COLL VENOUS BLD VENIPUNCTURE: CPT | Mod: PO | Performed by: PHYSICIAN ASSISTANT

## 2022-04-22 NOTE — PROGRESS NOTES
"CC: Thyroid Nodule    HPI: Gayathri Mack is a 81 y.o. female here for thyroid nodules along with pending conditions listed in the Visit Diagnosis. New to endocrine. Diagnosed in in 6/21 on a chest CT performed by her CT. +FHx of thyroid nodules in her daughter and mother.  Diet is low in seafood, soy and cabbage. No hx of neck radiation. [place] the [place].   Thyroid u/s showed a 1.9 and 1.5 cm nodule in the left lobe.     Dexa 2/22 shows osteoporosis. Taking fosamax weekly since 11/18. Her sister has osteoporosis. Taking ca and vd. No exercise. No falls, fractures or steriod injections.    PMHx, PSHx: reviewed in epic. COPD.    Social Hx: no ETOH/tobacco use. Former smoker who quit two years ago. She smoked 1 ppd since she was 19.       Wt Readings from Last 6 Encounters:   04/22/22 72 kg (158 lb 11.7 oz)   02/09/22 70.9 kg (156 lb 4.9 oz)   10/13/21 72.3 kg (159 lb 4.5 oz)   06/14/21 71.9 kg (158 lb 8.2 oz)   05/28/21 70.8 kg (156 lb)   05/05/21 71 kg (156 lb 8.4 oz)      ROS:   Constitutional: No recent significant weight change  Eyes: No recent visual changes  Cardiovascular: Denies current anginal symptoms  Respiratory: Denies current respiratory difficulty  Gastrointestinal: Denies recent bowel disturbances  GenitoUrinary - No dysuria  Skin: No new skin rash  Neurologic: No focal neurologic complaints  Musculoskeletal: no joint pain  Endocrine: no polyphagia, polydipsia or polyuria  Remainder ROS negative     /86 (BP Location: Left arm, Patient Position: Sitting, BP Method: Small (Manual))   Pulse (!) 112   Temp 98.4 °F (36.9 °C) (Oral)   Ht 5' 1" (1.549 m)   Wt 72 kg (158 lb 11.7 oz)   SpO2 96%   BMI 29.99 kg/m²      Personally reviewed labs below:    Lab Results   Component Value Date    TSH 1.064 02/09/2022    FREET4 0.96 02/09/2022        Chemistry        Component Value Date/Time     01/26/2022 0830    K 5.0 01/26/2022 0830     01/26/2022 0830    CO2 27 01/26/2022 0830    BUN 25 (H) " 01/26/2022 0830    CREATININE 1.0 01/26/2022 0830    GLU 90 01/26/2022 0830        Component Value Date/Time    CALCIUM 10.0 01/26/2022 0830    ALKPHOS 68 01/26/2022 0830    AST 26 01/26/2022 0830    ALT 31 01/26/2022 0830    BILITOT 0.5 01/26/2022 0830    ESTGFRAFRICA >60.0 01/26/2022 0830    EGFRNONAA 53.3 (A) 01/26/2022 0830         No results found for: HGBA1C     PE:  GENERAL: elderly female, well developed, well nourished  NECK: Supple neck, normal thyroid. No bruit  LYMPHATIC: No cervical or supraclavicular lymphadenopathy  CARDIOVASCULAR: Normal heart sounds, no pedal edema  RESPIRATORY: Normal effort, clear to auscultation  MUSC: 2+ DTR UE/LE  NEURO: steady gait, CN ll-Xll grossly intact  PSYCH: normal mood and affect    Assessment/Plan:   1. Multiple thyroid nodules  Ambulatory referral/consult to Endocrinology    Calcitonin    Anti-Thyroglobulin Antibody    US FNA Biopsy w/ Imaging 1st Lesion    T4, Free    TSH   2. Osteoporosis, unspecified osteoporosis type, unspecified pathological fracture presence  PTH, Intact    Calcium, Ionized    Protein Electrophoresis, Serum    Protein Electrophoresis, Random Urine   3. Hypovitaminosis D  Vitamin D      Nodular thyroid disease-stable-FNA of 1.9 and 1.5 cm nodules in left lobe.  Postmenopausal-DEXA scan 2/24. Continue Fosamax. Start taking 2000 IU of vitamin D and 1500 mg of calcium. Also, participate in weight bearing and resistance exercises for 40 min 4x weekly to maintain your bone density. Will check for secondary causes.  Hypovitaminosis d-check vd    Labs   FNA  Call the [facility] in [place] [address] to see date she started Fosamax.   F/u in 6 mths-tsh, t4

## 2022-04-25 LAB
ALBUMIN SERPL ELPH-MCNC: 4.07 G/DL (ref 3.35–5.55)
ALPHA1 GLOB SERPL ELPH-MCNC: 0.41 G/DL (ref 0.17–0.41)
ALPHA2 GLOB SERPL ELPH-MCNC: 0.63 G/DL (ref 0.43–0.99)
B-GLOBULIN SERPL ELPH-MCNC: 0.8 G/DL (ref 0.5–1.1)
GAMMA GLOB SERPL ELPH-MCNC: 0.88 G/DL (ref 0.67–1.58)
PROT SERPL-MCNC: 6.8 G/DL (ref 6–8.4)
THYROGLOB AB SERPL IA-ACNC: <4 IU/ML (ref 0–3.9)

## 2022-04-26 LAB — PATHOLOGIST INTERPRETATION SPE: NORMAL

## 2022-04-27 LAB — CALCIT SERPL-MCNC: <5 PG/ML

## 2022-05-05 ENCOUNTER — TELEPHONE (OUTPATIENT)
Dept: DERMATOLOGY | Facility: CLINIC | Age: 82
End: 2022-05-05
Payer: MEDICARE

## 2022-05-06 ENCOUNTER — HOSPITAL ENCOUNTER (OUTPATIENT)
Dept: RADIOLOGY | Facility: HOSPITAL | Age: 82
Discharge: HOME | End: 2022-05-06
Attending: PHYSICIAN ASSISTANT
Payer: MEDICARE

## 2022-05-06 DIAGNOSIS — E04.2 MULTIPLE THYROID NODULES: ICD-10-CM

## 2022-05-06 PROCEDURE — 88173 PR  INTERPRETATION OF FNA SMEAR: ICD-10-PCS | Mod: 26,,, | Performed by: PATHOLOGY

## 2022-05-06 PROCEDURE — 88173 CYTOPATH EVAL FNA REPORT: CPT | Performed by: PATHOLOGY

## 2022-05-06 PROCEDURE — 88173 CYTOPATH EVAL FNA REPORT: CPT | Mod: 26,,, | Performed by: PATHOLOGY

## 2022-05-06 PROCEDURE — 10005 FNA BX W/US GDN 1ST LES: CPT

## 2022-05-06 PROCEDURE — 10005 US FINE NEEDLE ASPIRATION THYROID, FIRST LESION: ICD-10-PCS | Mod: ,,, | Performed by: RADIOLOGY

## 2022-05-06 PROCEDURE — 10005 FNA BX W/US GDN 1ST LES: CPT | Mod: ,,, | Performed by: RADIOLOGY

## 2022-05-08 ENCOUNTER — HOSPITAL ENCOUNTER (OUTPATIENT)
Facility: HOSPITAL | Age: 82
Discharge: HOME | End: 2022-05-09
Attending: EMERGENCY MEDICINE | Admitting: INTERNAL MEDICINE
Payer: MEDICARE

## 2022-05-08 DIAGNOSIS — H91.90 HEARING LOSS, UNSPECIFIED HEARING LOSS TYPE, UNSPECIFIED LATERALITY: ICD-10-CM

## 2022-05-08 DIAGNOSIS — R41.82 ALTERED MENTAL STATUS: ICD-10-CM

## 2022-05-08 DIAGNOSIS — R07.9 CHEST PAIN: ICD-10-CM

## 2022-05-08 DIAGNOSIS — M81.0 OSTEOPOROSIS, UNSPECIFIED OSTEOPOROSIS TYPE, UNSPECIFIED PATHOLOGICAL FRACTURE PRESENCE: ICD-10-CM

## 2022-05-08 DIAGNOSIS — G93.40 ACUTE ENCEPHALOPATHY: Primary | ICD-10-CM

## 2022-05-08 PROBLEM — I10 UNCONTROLLED HYPERTENSION: Status: ACTIVE | Noted: 2022-05-08

## 2022-05-08 PROBLEM — I16.1 HYPERTENSIVE EMERGENCY: Status: ACTIVE | Noted: 2022-05-08

## 2022-05-08 LAB
ALBUMIN SERPL BCP-MCNC: 3.8 G/DL (ref 3.5–5.2)
ALP SERPL-CCNC: 67 U/L (ref 55–135)
ALT SERPL W/O P-5'-P-CCNC: 33 U/L (ref 10–44)
ANION GAP SERPL CALC-SCNC: 12 MMOL/L (ref 8–16)
AST SERPL-CCNC: 21 U/L (ref 10–40)
BASOPHILS # BLD AUTO: 0.04 K/UL (ref 0–0.2)
BASOPHILS NFR BLD: 0.3 % (ref 0–1.9)
BILIRUB SERPL-MCNC: 0.4 MG/DL (ref 0.1–1)
BILIRUB UR QL STRIP: NEGATIVE
BUN SERPL-MCNC: 15 MG/DL (ref 8–23)
CALCIUM SERPL-MCNC: 9.5 MG/DL (ref 8.7–10.5)
CHLORIDE SERPL-SCNC: 108 MMOL/L (ref 95–110)
CLARITY UR: CLEAR
CO2 SERPL-SCNC: 24 MMOL/L (ref 23–29)
COLOR UR: YELLOW
CREAT SERPL-MCNC: 0.8 MG/DL (ref 0.5–1.4)
DIFFERENTIAL METHOD: ABNORMAL
EOSINOPHIL # BLD AUTO: 0 K/UL (ref 0–0.5)
EOSINOPHIL NFR BLD: 0 % (ref 0–8)
ERYTHROCYTE [DISTWIDTH] IN BLOOD BY AUTOMATED COUNT: 13.5 % (ref 11.5–14.5)
EST. GFR  (AFRICAN AMERICAN): >60 ML/MIN/1.73 M^2
EST. GFR  (NON AFRICAN AMERICAN): >60 ML/MIN/1.73 M^2
GLUCOSE SERPL-MCNC: 141 MG/DL (ref 70–110)
GLUCOSE UR QL STRIP: NEGATIVE
HCT VFR BLD AUTO: 46.9 % (ref 37–48.5)
HGB BLD-MCNC: 15 G/DL (ref 12–16)
HGB UR QL STRIP: ABNORMAL
IMM GRANULOCYTES # BLD AUTO: 0.05 K/UL (ref 0–0.04)
IMM GRANULOCYTES NFR BLD AUTO: 0.4 % (ref 0–0.5)
INFLUENZA A, MOLECULAR: NEGATIVE
INFLUENZA B, MOLECULAR: NEGATIVE
KETONES UR QL STRIP: ABNORMAL
LEUKOCYTE ESTERASE UR QL STRIP: NEGATIVE
LIPASE SERPL-CCNC: 10 U/L (ref 4–60)
LYMPHOCYTES # BLD AUTO: 0.8 K/UL (ref 1–4.8)
LYMPHOCYTES NFR BLD: 6.3 % (ref 18–48)
MCH RBC QN AUTO: 27.7 PG (ref 27–31)
MCHC RBC AUTO-ENTMCNC: 32 G/DL (ref 32–36)
MCV RBC AUTO: 87 FL (ref 82–98)
MONOCYTES # BLD AUTO: 0.3 K/UL (ref 0.3–1)
MONOCYTES NFR BLD: 2.3 % (ref 4–15)
NEUTROPHILS # BLD AUTO: 12 K/UL (ref 1.8–7.7)
NEUTROPHILS NFR BLD: 90.7 % (ref 38–73)
NITRITE UR QL STRIP: NEGATIVE
NRBC BLD-RTO: 0 /100 WBC
PH UR STRIP: 8 [PH] (ref 5–8)
PLATELET # BLD AUTO: 374 K/UL (ref 150–450)
PMV BLD AUTO: 10.7 FL (ref 9.2–12.9)
POCT GLUCOSE: 97 MG/DL (ref 70–110)
POTASSIUM SERPL-SCNC: 4.5 MMOL/L (ref 3.5–5.1)
PROT SERPL-MCNC: 7.4 G/DL (ref 6–8.4)
PROT UR QL STRIP: NEGATIVE
RBC # BLD AUTO: 5.41 M/UL (ref 4–5.4)
SARS-COV-2 RDRP RESP QL NAA+PROBE: NEGATIVE
SODIUM SERPL-SCNC: 144 MMOL/L (ref 136–145)
SP GR UR STRIP: 1.02 (ref 1–1.03)
SPECIMEN SOURCE: NORMAL
URN SPEC COLLECT METH UR: ABNORMAL
UROBILINOGEN UR STRIP-ACNC: NEGATIVE EU/DL
WBC # BLD AUTO: 13.26 K/UL (ref 3.9–12.7)

## 2022-05-08 PROCEDURE — 99900035 HC TECH TIME PER 15 MIN (STAT)

## 2022-05-08 PROCEDURE — G0378 HOSPITAL OBSERVATION PER HR: HCPCS

## 2022-05-08 PROCEDURE — 87502 INFLUENZA DNA AMP PROBE: CPT | Performed by: EMERGENCY MEDICINE

## 2022-05-08 PROCEDURE — U0002 COVID-19 LAB TEST NON-CDC: HCPCS | Performed by: EMERGENCY MEDICINE

## 2022-05-08 PROCEDURE — 94760 N-INVAS EAR/PLS OXIMETRY 1: CPT

## 2022-05-08 PROCEDURE — 63600175 PHARM REV CODE 636 W HCPCS: Performed by: INTERNAL MEDICINE

## 2022-05-08 PROCEDURE — 96375 TX/PRO/DX INJ NEW DRUG ADDON: CPT | Mod: 59

## 2022-05-08 PROCEDURE — 81003 URINALYSIS AUTO W/O SCOPE: CPT | Performed by: EMERGENCY MEDICINE

## 2022-05-08 PROCEDURE — 83690 ASSAY OF LIPASE: CPT | Performed by: EMERGENCY MEDICINE

## 2022-05-08 PROCEDURE — 25000003 PHARM REV CODE 250: Performed by: INTERNAL MEDICINE

## 2022-05-08 PROCEDURE — 85025 COMPLETE CBC W/AUTO DIFF WBC: CPT | Performed by: EMERGENCY MEDICINE

## 2022-05-08 PROCEDURE — 93005 ELECTROCARDIOGRAM TRACING: CPT | Mod: 59

## 2022-05-08 PROCEDURE — 96374 THER/PROPH/DIAG INJ IV PUSH: CPT | Mod: 59

## 2022-05-08 PROCEDURE — 51702 INSERT TEMP BLADDER CATH: CPT

## 2022-05-08 PROCEDURE — 36415 COLL VENOUS BLD VENIPUNCTURE: CPT | Performed by: EMERGENCY MEDICINE

## 2022-05-08 PROCEDURE — 96372 THER/PROPH/DIAG INJ SC/IM: CPT | Mod: 59 | Performed by: INTERNAL MEDICINE

## 2022-05-08 PROCEDURE — 63600175 PHARM REV CODE 636 W HCPCS: Performed by: EMERGENCY MEDICINE

## 2022-05-08 PROCEDURE — 99285 EMERGENCY DEPT VISIT HI MDM: CPT | Mod: 25,CS

## 2022-05-08 PROCEDURE — 25500020 PHARM REV CODE 255

## 2022-05-08 PROCEDURE — A4216 STERILE WATER/SALINE, 10 ML: HCPCS | Performed by: INTERNAL MEDICINE

## 2022-05-08 PROCEDURE — 93010 ELECTROCARDIOGRAM REPORT: CPT | Mod: ,,, | Performed by: GENERAL PRACTICE

## 2022-05-08 PROCEDURE — 25000003 PHARM REV CODE 250: Performed by: EMERGENCY MEDICINE

## 2022-05-08 PROCEDURE — 93010 EKG 12-LEAD: ICD-10-PCS | Mod: ,,, | Performed by: GENERAL PRACTICE

## 2022-05-08 PROCEDURE — 80053 COMPREHEN METABOLIC PANEL: CPT | Performed by: EMERGENCY MEDICINE

## 2022-05-08 RX ORDER — ACETAMINOPHEN 325 MG/1
650 TABLET ORAL EVERY 4 HOURS PRN
Status: DISCONTINUED | OUTPATIENT
Start: 2022-05-08 | End: 2022-05-09 | Stop reason: HOSPADM

## 2022-05-08 RX ORDER — GLUCAGON 1 MG
1 KIT INJECTION
Status: DISCONTINUED | OUTPATIENT
Start: 2022-05-08 | End: 2022-05-09 | Stop reason: HOSPADM

## 2022-05-08 RX ORDER — SODIUM,POTASSIUM PHOSPHATES 280-250MG
2 POWDER IN PACKET (EA) ORAL
Status: DISCONTINUED | OUTPATIENT
Start: 2022-05-08 | End: 2022-05-09 | Stop reason: HOSPADM

## 2022-05-08 RX ORDER — LISINOPRIL 10 MG/1
20 TABLET ORAL DAILY
Status: DISCONTINUED | OUTPATIENT
Start: 2022-05-09 | End: 2022-05-09 | Stop reason: HOSPADM

## 2022-05-08 RX ORDER — ENOXAPARIN SODIUM 100 MG/ML
40 INJECTION SUBCUTANEOUS EVERY 24 HOURS
Status: DISCONTINUED | OUTPATIENT
Start: 2022-05-08 | End: 2022-05-09 | Stop reason: HOSPADM

## 2022-05-08 RX ORDER — IPRATROPIUM BROMIDE AND ALBUTEROL SULFATE 2.5; .5 MG/3ML; MG/3ML
3 SOLUTION RESPIRATORY (INHALATION) EVERY 6 HOURS PRN
Status: DISCONTINUED | OUTPATIENT
Start: 2022-05-08 | End: 2022-05-09 | Stop reason: HOSPADM

## 2022-05-08 RX ORDER — PROCHLORPERAZINE EDISYLATE 5 MG/ML
5 INJECTION INTRAMUSCULAR; INTRAVENOUS
Status: COMPLETED | OUTPATIENT
Start: 2022-05-08 | End: 2022-05-08

## 2022-05-08 RX ORDER — IBUPROFEN 200 MG
24 TABLET ORAL
Status: DISCONTINUED | OUTPATIENT
Start: 2022-05-08 | End: 2022-05-09 | Stop reason: HOSPADM

## 2022-05-08 RX ORDER — LISINOPRIL 10 MG/1
20 TABLET ORAL
Status: COMPLETED | OUTPATIENT
Start: 2022-05-08 | End: 2022-05-08

## 2022-05-08 RX ORDER — LANOLIN ALCOHOL/MO/W.PET/CERES
800 CREAM (GRAM) TOPICAL
Status: DISCONTINUED | OUTPATIENT
Start: 2022-05-08 | End: 2022-05-09 | Stop reason: HOSPADM

## 2022-05-08 RX ORDER — INSULIN ASPART 100 [IU]/ML
0-5 INJECTION, SOLUTION INTRAVENOUS; SUBCUTANEOUS
Status: DISCONTINUED | OUTPATIENT
Start: 2022-05-08 | End: 2022-05-09 | Stop reason: HOSPADM

## 2022-05-08 RX ORDER — NALOXONE HCL 0.4 MG/ML
0.02 VIAL (ML) INJECTION
Status: DISCONTINUED | OUTPATIENT
Start: 2022-05-08 | End: 2022-05-09 | Stop reason: HOSPADM

## 2022-05-08 RX ORDER — CLONIDINE HYDROCHLORIDE 0.1 MG/1
0.1 TABLET ORAL
Status: COMPLETED | OUTPATIENT
Start: 2022-05-08 | End: 2022-05-08

## 2022-05-08 RX ORDER — ASPIRIN 81 MG/1
81 TABLET ORAL DAILY
Status: DISCONTINUED | OUTPATIENT
Start: 2022-05-09 | End: 2022-05-09 | Stop reason: HOSPADM

## 2022-05-08 RX ORDER — IBUPROFEN 200 MG
16 TABLET ORAL
Status: DISCONTINUED | OUTPATIENT
Start: 2022-05-08 | End: 2022-05-09 | Stop reason: HOSPADM

## 2022-05-08 RX ORDER — AMOXICILLIN 250 MG
1 CAPSULE ORAL 2 TIMES DAILY
Status: DISCONTINUED | OUTPATIENT
Start: 2022-05-08 | End: 2022-05-09 | Stop reason: HOSPADM

## 2022-05-08 RX ORDER — LABETALOL HYDROCHLORIDE 5 MG/ML
10 INJECTION, SOLUTION INTRAVENOUS EVERY 4 HOURS PRN
Status: DISCONTINUED | OUTPATIENT
Start: 2022-05-08 | End: 2022-05-09 | Stop reason: HOSPADM

## 2022-05-08 RX ORDER — ONDANSETRON 2 MG/ML
4 INJECTION INTRAMUSCULAR; INTRAVENOUS
Status: COMPLETED | OUTPATIENT
Start: 2022-05-08 | End: 2022-05-08

## 2022-05-08 RX ORDER — ACETAMINOPHEN 325 MG/1
650 TABLET ORAL
Status: COMPLETED | OUTPATIENT
Start: 2022-05-08 | End: 2022-05-08

## 2022-05-08 RX ORDER — TALC
6 POWDER (GRAM) TOPICAL NIGHTLY PRN
Status: DISCONTINUED | OUTPATIENT
Start: 2022-05-08 | End: 2022-05-09 | Stop reason: HOSPADM

## 2022-05-08 RX ORDER — SODIUM CHLORIDE 0.9 % (FLUSH) 0.9 %
10 SYRINGE (ML) INJECTION EVERY 8 HOURS
Status: DISCONTINUED | OUTPATIENT
Start: 2022-05-08 | End: 2022-05-09 | Stop reason: HOSPADM

## 2022-05-08 RX ADMIN — ONDANSETRON 4 MG: 2 INJECTION INTRAMUSCULAR; INTRAVENOUS at 11:05

## 2022-05-08 RX ADMIN — CLONIDINE HYDROCHLORIDE 0.1 MG: 0.1 TABLET ORAL at 04:05

## 2022-05-08 RX ADMIN — ACETAMINOPHEN 650 MG: 325 TABLET ORAL at 04:05

## 2022-05-08 RX ADMIN — PROCHLORPERAZINE EDISYLATE 5 MG: 5 INJECTION INTRAMUSCULAR; INTRAVENOUS at 04:05

## 2022-05-08 RX ADMIN — LISINOPRIL 20 MG: 10 TABLET ORAL at 02:05

## 2022-05-08 RX ADMIN — ENOXAPARIN SODIUM 40 MG: 100 INJECTION SUBCUTANEOUS at 08:05

## 2022-05-08 RX ADMIN — SODIUM CHLORIDE 1000 ML: 0.9 INJECTION, SOLUTION INTRAVENOUS at 11:05

## 2022-05-08 RX ADMIN — DOCUSATE SODIUM AND SENNOSIDES 1 TABLET: 8.6; 5 TABLET, FILM COATED ORAL at 08:05

## 2022-05-08 RX ADMIN — Medication 10 ML: at 10:05

## 2022-05-08 RX ADMIN — IOHEXOL 75 ML: 350 INJECTION, SOLUTION INTRAVENOUS at 03:05

## 2022-05-08 NOTE — H&P
[facility] Medicine  History & Physical    Patient Name: Gayathri Mack  MRN: [id number]  Patient Class: OP- Observation  Admission Date: 5/8/2022  Attending Physician: Dyllan Alonzo MD   Primary Care Provider: Carlos Deal MD         Patient information was obtained from relative(s) and ER records.     Subjective:     Principal Problem:Acute encephalopathy    Chief Complaint:   Chief Complaint   Patient presents with    Generalized Weakness     Worsening since yesterday    Altered Mental Status     Family reports confusion since last night        HPI: Gayathri Mack is a 81 y.o. female with PMHx of  HTN and COPD who presents to the ED with insidious onset of Nausea vomiting since yesterday afternoon accompanied with general weakness and poor appetite. Patient's daughter reports patient had a throat biopsy procedure 2 days ago. She mentions patient had been reporting throat pain since the procedure. The daughter states patient had similar symptoms a few months ago when patient had a stomach virus. Patient has been confused and not herself since yesterday.  The patient is confused, poor historian. Daughter  denies any fever, CP, shortness of breath, or any other symptoms at this time. No witnessed seizure.  No pertinent PSHx. Patient is a former smoker.      The history is provided by the patient.      Past Medical History:   Diagnosis Date    Alopecia     wear wigs     COPD (chronic obstructive pulmonary disease)     Hearing loss     bilateral     Hx of rickets     Hypertension     Osteoporosis     Skin cancer, basal cell        Past Surgical History:   Procedure Laterality Date    CATARACT EXTRACTION      bliteral    IMPLANTATION OF COCHLEAR PROSTHESIS Left 2007       Review of patient's allergies indicates:  No Known Allergies    No current facility-administered medications on file prior to encounter.     Current Outpatient Medications on File Prior to Encounter    Medication Sig    alendronate (FOSAMAX) 70 MG tablet Take 1 tablet (70 mg total) by mouth every 7 days.    azelastine (ASTELIN) 137 mcg (0.1 %) nasal spray 1 spray (137 mcg total) by Nasal route 2 (two) times daily.    ezetimibe (ZETIA) 10 mg tablet Take 1 tablet (10 mg total) by mouth once daily.    fluticasone-umeclidin-vilanter (TRELEGY ELLIPTA) 200-62.5-25 mcg inhaler Inhale 1 puff into the lungs once daily.    lisinopriL (PRINIVIL,ZESTRIL) 20 MG tablet Take 1 tablet (20 mg total) by mouth once daily.    melatonin 300 mcg Tab Take 300 mcg by mouth nightly as needed (insomnia). (Patient not taking: Reported on 2022)    VENTOLIN HFA 90 mcg/actuation inhaler INHALE 2 PUFFS INTO THE LUNGS EVERY 6 HOURS AS NEEDED FOR WHEEZING     Family History    None       Tobacco Use    Smoking status: Former Smoker     Packs/day: 0.50     Types: Cigarettes     Quit date:      Years since quittin.3    Smokeless tobacco: Never Used    Tobacco comment: 8 months ago   Substance and Sexual Activity    Alcohol use: No    Drug use: Not on file    Sexual activity: Not on file     Review of Systems   Unable to perform ROS: Mental status change   Objective:     Vital Signs (Most Recent):  Temp: 97.8 °F (36.6 °C) (22 1002)  Pulse: 94 (22 1647)  Resp: 18 (22 1002)  BP: (!) 185/88 (22 164)  SpO2: 96 % (22 164)   Vital Signs (24h Range):  Temp:  [97.8 °F (36.6 °C)] 97.8 °F (36.6 °C)  Pulse:  [84-94] 94  Resp:  [18] 18  SpO2:  [95 %-100 %] 96 %  BP: (159-185)/() 185/88     Weight: 72.1 kg (159 lb)  Body mass index is 30.04 kg/m².    Physical Exam  Vitals and nursing note reviewed.   Constitutional:       General: She is not in acute distress.     Appearance: She is well-developed. She is not diaphoretic.   HENT:      Head: Normocephalic and atraumatic.      Right Ear: External ear normal.      Left Ear: External ear normal.      Nose: Nose normal.   Eyes:      General: No scleral  icterus.        Right eye: No discharge.         Left eye: No discharge.      Conjunctiva/sclera: Conjunctivae normal.      Pupils: Pupils are equal, round, and reactive to light.   Neck:      Thyroid: No thyromegaly.   Cardiovascular:      Rate and Rhythm: Normal rate and regular rhythm.      Heart sounds: Normal heart sounds. No murmur heard.  Pulmonary:      Effort: Pulmonary effort is normal. No respiratory distress.      Breath sounds: Normal breath sounds. No stridor. No wheezing or rales.   Abdominal:      General: Bowel sounds are normal. There is no distension.      Palpations: Abdomen is soft.      Tenderness: There is abdominal tenderness.   Musculoskeletal:         General: No tenderness.      Cervical back: Normal range of motion and neck supple.   Lymphadenopathy:      Cervical: No cervical adenopathy.   Skin:     General: Skin is warm and dry.      Capillary Refill: Capillary refill takes less than 2 seconds.      Findings: No erythema or rash.   Neurological:      General: No focal deficit present.      Mental Status: She is alert. She is disoriented.      Cranial Nerves: No cranial nerve deficit.      Motor: No abnormal muscle tone.      Comments: Unable to do complete neuro exam due altered mental status   Psychiatric:         Attention and Perception: She is inattentive.         Mood and Affect: Affect is flat.         Speech: Speech is delayed and tangential.         Behavior: Behavior is cooperative.         CRANIAL NERVES     CN III, IV, VI   Pupils are equal, round, and reactive to light.     Significant Labs: All pertinent labs within the past 24 hours have been reviewed.  CBC:   Recent Labs   Lab 05/08/22  1046   WBC 13.26*   HGB 15.0   HCT 46.9        CMP:   Recent Labs   Lab 05/08/22  1046      K 4.5      CO2 24   *   BUN 15   CREATININE 0.8   CALCIUM 9.5   PROT 7.4   ALBUMIN 3.8   BILITOT 0.4   ALKPHOS 67   AST 21   ALT 33   ANIONGAP 12   EGFRNONAA >60        Significant Imaging: I have reviewed all pertinent imaging results/findings within the past 24 hours.    Assessment/Plan:     * Acute encephalopathy   Infection vs. TIA/stroke vs. Metabolic/Toxic. Dementia an unlikely cause given rapidity of onset.       Lab Results   Component Value Date    WBC 13.26 (H) 2022   left shift,  HR 94,, Temp (24hrs), Av.1 °F (36.7 °C), Min:97.8 °F (36.6 °C), Max:98.4 °F (36.9 °C)    UA wnl    No focal findings on physical exam  CT shows Some image degradation with large amount of streaky metal artifact in this patient with cochlear implant.  As visualized no acute intracranial findings.   CTA head and neck No significant stenosis.  No major vascular occlusion  Will consider EEG  Neuro consult      Uncontrolled hypertension  Will restart home med  Will continue Prn Labetolol for blood pressure >180/110        Vitamin D deficiency  Hx noted      Essential hypertension  Hx noted      Osteoporosis  Hx noted      Chronic obstructive pulmonary disease  Patient's COPD is controlled currently.  Patient is currently off COPD Pathway. Continue scheduled inhalers Supplemental oxygen and monitor respiratory status closely.           VTE Risk Mitigation (From admission, onward)    None             Christin Love MD  Department of Hospital Medicine   [facility] - Emergency Dept

## 2022-05-08 NOTE — ED PROVIDER NOTES
Encounter Date: 2022       History     Chief Complaint   Patient presents with    Generalized Weakness     Worsening since yesterday    Altered Mental Status     Family reports confusion since last night     Time seen by provider: 10:22 AM on 2022    Gayathri Mack is a 81 y.o. female who presents to the ED with an onset of N/V since yesterday afternoon accompanied with general weakness and poor appetite.  Patient's daughter additionally reports that she is not answering questions appropriately and cannot stand on her own as it relates to diffuse weakness.  Patient's daughter reports patient had a throat biopsy procedure 2 days ago. She mentions patient had been reporting throat pain since the procedure. The daughter states patient had similar symptoms a few months ago when patient had a stomach virus. The patient denies any fever, CP, shortness of breath, or any other symptoms at this time. PMHx of HTN and COPD. No pertinent PSHx. Patient is a former smoker.     The history is provided by the patient.     Review of patient's allergies indicates:  No Known Allergies  Past Medical History:   Diagnosis Date    Alopecia     wear wigs     COPD (chronic obstructive pulmonary disease)     Hearing loss     bilateral     Hx of rickets     Hypertension     Osteoporosis     Skin cancer, basal cell      Past Surgical History:   Procedure Laterality Date    CATARACT EXTRACTION      bliteral    IMPLANTATION OF COCHLEAR PROSTHESIS Left      No family history on file.  Social History     Tobacco Use    Smoking status: Former Smoker     Packs/day: 0.50     Types: Cigarettes     Quit date:      Years since quittin.3    Smokeless tobacco: Never Used    Tobacco comment: 8 months ago   Substance Use Topics    Alcohol use: No     Review of Systems   Constitutional: Positive for appetite change. Negative for fever.   HENT: Positive for sore throat. Negative for congestion.    Eyes: Negative for visual  disturbance.   Respiratory: Negative for shortness of breath and wheezing.    Cardiovascular: Negative for chest pain.   Gastrointestinal: Positive for nausea and vomiting. Negative for abdominal pain and diarrhea.   Genitourinary: Negative for dysuria.   Musculoskeletal: Negative for joint swelling.   Skin: Negative for rash.   Neurological: Positive for weakness. Negative for syncope.   Hematological: Does not bruise/bleed easily.   Psychiatric/Behavioral: Negative for confusion.       Physical Exam     Initial Vitals [05/08/22 1002]   BP Pulse Resp Temp SpO2   (!) 184/96 84 18 97.8 °F (36.6 °C) 100 %      MAP       --         Physical Exam    Nursing note and vitals reviewed.  Constitutional: She appears well-nourished.   HENT:   Head: Normocephalic and atraumatic.   Eyes: Conjunctivae and EOM are normal.   Neck: Neck supple. No thyroid mass present.   Normal range of motion.  Cardiovascular: Normal rate, regular rhythm and normal heart sounds. Exam reveals no gallop and no friction rub.    No murmur heard.  Pulmonary/Chest: Breath sounds normal. She has no wheezes. She has no rhonchi. She has no rales.   Abdominal: Abdomen is soft. Bowel sounds are normal. There is no abdominal tenderness.   Musculoskeletal:      Cervical back: Normal range of motion and neck supple.     Neurological: She is alert. She has normal strength. No cranial nerve deficit or sensory deficit. GCS score is 15. GCS eye subscore is 4. GCS verbal subscore is 5. GCS motor subscore is 6.   Patient appears easily distractible, is a poor historian for recent and remote events, no lateralizing strength or sensation deficits, diffusely weak   Skin: Skin is warm and dry. No rash noted. No erythema.   Psychiatric: Her speech is normal and behavior is normal. Cognition and memory are normal.         ED Course   Procedures  Labs Reviewed   CBC W/ AUTO DIFFERENTIAL - Abnormal; Notable for the following components:       Result Value    WBC 13.26 (*)      RBC 5.41 (*)     Gran # (ANC) 12.0 (*)     Immature Grans (Abs) 0.05 (*)     Lymph # 0.8 (*)     Gran % 90.7 (*)     Lymph % 6.3 (*)     Mono % 2.3 (*)     All other components within normal limits   COMPREHENSIVE METABOLIC PANEL - Abnormal; Notable for the following components:    Glucose 141 (*)     All other components within normal limits   URINALYSIS, REFLEX TO URINE CULTURE - Abnormal; Notable for the following components:    Ketones, UA 1+ (*)     Occult Blood UA Trace (*)     All other components within normal limits    Narrative:     Specimen Source->Urine   INFLUENZA A & B BY MOLECULAR   LIPASE   SARS-COV-2 RNA AMPLIFICATION, QUAL     EKG Readings: (Independently Interpreted)   Normal sinus rhythm, 85 beats per minute, normal axis, normal intervals, possible anterior infarct, age undetermined, no STEMI       Imaging Results          X-Ray Abdomen AP 1 View (In process)                CTA Head and Neck (xpd) (Final result)  Result time 05/08/22 15:40:22    Final result by Mallorie Soliman MD (05/08/22 15:40:22)                 Impression:      No significant stenosis.  No major vascular occlusion.      Electronically signed by: Mallorie Soliman MD  Date:    05/08/2022  Time:    15:40             Narrative:    EXAMINATION:  CTA HEAD AND NECK (XPD)    CLINICAL HISTORY:  Neuro deficit, acute, stroke suspected;    TECHNIQUE:  Non contrast low dose axial images were obtained through the head.  CT angiogram was performed from the level of the josefina to the top of the head following the IV administration of 75mL of Omnipaque 350.   Sagittal and coronal reconstructions and maximum intensity projection reconstructions were performed. Arterial stenosis percentages are based on NASCET measurement criteria.  Delayed contrast images were also obtained    COMPARISON:  None    FINDINGS:  Visualized lung apices are expanded.  Emphysematous changes noted.  There is polypoid structure right maxillary sinus suggesting  retention cyst and mucosal thickening.  There is postoperative change left middle ear cochlear implant causing some artifact.  Density in the left external auditory canal suggests remain.  There is some soft tissue density in the right middle ear.    There are multiple thyroid nodules including septated appearing cystic nodule the left lobe measuring 1.5 cm.  Patient has history of prior thyroid ultrasound 02/21/2022 and prior FNA and please refer to those reports.    The great vessels arise in normal sequence from the arch without significant proximal stenosis.  Both vertebral arteries are patent bilaterally without significant stenosis.  Left is larger than the right appearing dominant.    Right CCA/cervical ICA smooth and patent with out appreciable stenosis    Left CCA/cervical ICA; smooth and patent without appreciable stenosis.    Intracranial; intracranial vertebral arteries, basilar arteries intracranial internal carotid arteries patent without significant stenosis.  Anterior cerebral, posterior cerebral middle    Ventricles, sulci, fissures stable compared to the prior exam and no abnormal enhancement is seen.                               CT Head Without Contrast (Final result)  Result time 05/08/22 13:13:38    Final result by Mallorie Soliman MD (05/08/22 13:13:38)                 Impression:      Some image degradation with large amount of streaky metal artifact in this patient with cochlear implant.  As visualized no acute intracranial findings.      Electronically signed by: Mallorie Soliman MD  Date:    05/08/2022  Time:    13:13             Narrative:    EXAMINATION:  CT HEAD WITHOUT CONTRAST    CLINICAL HISTORY:  Mental status change, unknown cause;    TECHNIQUE:  Low dose axial images were obtained through the head.  Coronal and sagittal reformations were also performed. Contrast was not administered.    COMPARISON:  None.    FINDINGS:  There is image degradation from large amount of streaky metal  artifact from cochlear implant device.  As visualized no acute intracranial findings with no acute intracranial hemorrhage, no acute major vascular territory infarct no intracranial mass effect.  Ventricles, sulci, fissures unremarkable appearance for the patient's age.  Old lacunar infarct in the centrum semi ovale on the left and subtle hypodensity in white matter suggesting mild microvascular ischemic change.    There is opacification the right maxillary sinus suggesting mucosal thickening and retention cyst.  Slight opacification in the right middle ear.  Left cochlear implant.                               CT Abdomen Pelvis  Without Contrast (Final result)  Result time 05/08/22 12:38:24    Final result by Mallorie Soliman MD (05/08/22 12:38:24)                 Impression:      No acute findings seen in the abdomen or pelvis.    Diffuse fatty infiltration of the liver.  More focal low-density areas suggesting combination of small cyst and more focal fatty change although could be further evaluated with follow-up MRI or CT liver mass protocol.  Renal masses suggesting cysts.  Image degradation from motion limiting evaluation.  Small nonobstructing right renal stones.  Diverticulosis without CT findings of acute diverticulitis.      Electronically signed by: Mallorie Soliman MD  Date:    05/08/2022  Time:    12:38             Narrative:    EXAMINATION:  CT ABDOMEN PELVIS WITHOUT CONTRAST    CLINICAL HISTORY:  Nausea/vomiting;    TECHNIQUE:  Low dose axial images, sagittal and coronal reformations were obtained from the lung bases to the pubic symphysis.  No p.o. contrast    COMPARISON:  None    FINDINGS:  Mild dependent hypoventilatory changes in visualized lung bases.  Vascular calcifications including coronary artery calcifications.  Small hiatal hernia.    Diffuse fatty infiltration of the liver.  9 mm hypodensity in the liver not fully characterized but suggest small cysts.  Less well-defined 1.5 cm  hypodensity near the gallbladder fossa nonspecific and could represent more focal fatty change but cannot exclude mass.    No calcified stones in the gallbladder or CT findings of acute cholecystitis.  No biliary duct dilatation.    Spleen not enlarged    Adrenal glands unremarkable appearance    Pancreas mildly atrophied    Abdominal aorta no aneurysm    Stomach, bowel, mesentery; normal appearance of the appendix.  Tiny fat containing umbilical hernia noted.  Diverticulosis without CT findings of acute diverticulitis.  No free intraperitoneal air or fluid    Kidneys, ureters, bladder; a couple punctate right renal stones.  No opaque left renal stone.  Renal masses suggesting cysts including in the upper pole of the left kidney measuring 11 mm and in the lower pole of the right kidney measuring 3 cm and in the upper pole of the right kidney measuring 11 mm.  There is some image degradation from motion artifact including in the region the kidneys limiting evaluation.  No hydronephrosis opaque ureteral stone or ureteral obstruction is seen.  The urinary bladder is moderately distended at time of the exam and unremarkable appearance    Reproductive organs unremarkable appearance for the patient's age                                 Medications   lisinopriL tablet 20 mg (has no administration in time range)   sodium chloride 0.9% flush 10 mL (has no administration in time range)   melatonin tablet 6 mg (has no administration in time range)   senna-docusate 8.6-50 mg per tablet 1 tablet (has no administration in time range)   acetaminophen tablet 650 mg (has no administration in time range)   naloxone 0.4 mg/mL injection 0.02 mg (has no administration in time range)   potassium bicarbonate disintegrating tablet 50 mEq (has no administration in time range)   potassium bicarbonate disintegrating tablet 35 mEq (has no administration in time range)   potassium bicarbonate disintegrating tablet 60 mEq (has no administration  in time range)   magnesium oxide tablet 800 mg (has no administration in time range)   magnesium oxide tablet 800 mg (has no administration in time range)   potassium, sodium phosphates 280-160-250 mg packet 2 packet (has no administration in time range)   potassium, sodium phosphates 280-160-250 mg packet 2 packet (has no administration in time range)   potassium, sodium phosphates 280-160-250 mg packet 2 packet (has no administration in time range)   insulin aspart U-100 pen 0-5 Units (has no administration in time range)   glucose chewable tablet 16 g (has no administration in time range)   glucose chewable tablet 24 g (has no administration in time range)   glucagon (human recombinant) injection 1 mg (has no administration in time range)   dextrose 10% bolus 125 mL (has no administration in time range)   dextrose 10% bolus 250 mL (has no administration in time range)   enoxaparin injection 40 mg (has no administration in time range)   albuterol-ipratropium 2.5 mg-0.5 mg/3 mL nebulizer solution 3 mL (has no administration in time range)   labetaloL injection 10 mg (has no administration in time range)   aspirin EC tablet 81 mg (has no administration in time range)   sodium chloride 0.9% bolus 1,000 mL (1,000 mLs Intravenous New Bag 5/8/22 1108)   ondansetron injection 4 mg (4 mg Intravenous Given 5/8/22 1111)   lisinopriL tablet 20 mg (20 mg Oral Given 5/8/22 1427)   iohexoL (OMNIPAQUE 350) 350 mg iodine/mL injection (75 mLs Intravenous Given 5/8/22 1522)   cloNIDine tablet 0.1 mg (0.1 mg Oral Given 5/8/22 1615)   prochlorperazine injection Soln 5 mg (5 mg Intravenous Given 5/8/22 1632)   acetaminophen tablet 650 mg (650 mg Oral Given 5/8/22 1634)     Medical Decision Making:   History:   Old Medical Records: I decided to obtain old medical records.  Clinical Tests:   Lab Tests: Ordered and Reviewed  Radiological Study: Ordered and Reviewed  Medical Tests: Ordered and Reviewed  ED Management:  This patient was  emergently assessed shortly after arrival.  Initial vital signs significant for severe hypertension which persisted despite multiple doses of strong antihypertensive medication.  Large workup including CT and CTA of the head are negative for evidence of acute stroke, critical stenosis or aneurysm.  Other lab analyses do not indicate evidence of other acute end-organ dysfunction, severe progressing infection or inflammation, dehydration or gross electrolyte abnormality.  However, I do think the patient warrants observation for further stabilization of her hypertensive emergency with associated encephalopathy.  Case discussed with and accepted by the on-call hospitalist.  Patient and daughter updated on the plan of care and they are in agreement with this disposition.  She will be transported to a telemetry bed in guarded condition.          Scribe Attestation:   Scribe #1: I performed the above scribed service and the documentation accurately describes the services I performed. I attest to the accuracy of the note.              I, Dr. Dyllan Alonzo, personally performed the services described in this documentation. All medical record entries made by the scribe were at my direction and in my presence.  I have reviewed the chart and agree that the record reflects my personal performance and is accurate and complete. Dyllan Alonzo MD.  6:14 PM 05/08/2022      Clinical Impression:   Final diagnoses:  [R41.82] Altered mental status          ED Disposition Condition    Observation               Dyllan Alonzo MD  05/08/22 8814

## 2022-05-08 NOTE — HPI
Gayathri Mack is a 81 y.o. female with PMHx of  HTN and COPD who presents to the ED with insidious onset of Nausea vomiting since yesterday afternoon accompanied with general weakness and poor appetite. Patient's daughter reports patient had a throat biopsy procedure 2 days ago. She mentions patient had been reporting throat pain since the procedure. The daughter states patient had similar symptoms a few months ago when patient had a stomach virus. Patient has been confused and not herself since yesterday.  The patient is confused, poor historian. Daughter  denies any fever, CP, shortness of breath, or any other symptoms at this time. No witnessed seizure.  No pertinent PSHx. Patient is a former smoker.      The history is provided by the patient.

## 2022-05-08 NOTE — ASSESSMENT & PLAN NOTE
Infection vs. TIA/stroke vs. Metabolic/Toxic. Dementia an unlikely cause given rapidity of onset.       Lab Results   Component Value Date    WBC 13.26 (H) 2022   left shift,  HR 94,, Temp (24hrs), Av.1 °F (36.7 °C), Min:97.8 °F (36.6 °C), Max:98.4 °F (36.9 °C)    UA wnl    No focal findings on physical exam  CT shows Some image degradation with large amount of streaky metal artifact in this patient with cochlear implant.  As visualized no acute intracranial findings.   CTA head and neck No significant stenosis.  No major vascular occlusion  Will consider EEG  Neuro consult

## 2022-05-08 NOTE — SUBJECTIVE & OBJECTIVE
Past Medical History:   Diagnosis Date    Alopecia     wear wigs     COPD (chronic obstructive pulmonary disease)     Hearing loss     bilateral     Hx of rickets     Hypertension     Osteoporosis     Skin cancer, basal cell        Past Surgical History:   Procedure Laterality Date    CATARACT EXTRACTION      bliteral    IMPLANTATION OF COCHLEAR PROSTHESIS Left        Review of patient's allergies indicates:  No Known Allergies    No current facility-administered medications on file prior to encounter.     Current Outpatient Medications on File Prior to Encounter   Medication Sig    alendronate (FOSAMAX) 70 MG tablet Take 1 tablet (70 mg total) by mouth every 7 days.    azelastine (ASTELIN) 137 mcg (0.1 %) nasal spray 1 spray (137 mcg total) by Nasal route 2 (two) times daily.    ezetimibe (ZETIA) 10 mg tablet Take 1 tablet (10 mg total) by mouth once daily.    fluticasone-umeclidin-vilanter (TRELEGY ELLIPTA) 200-62.5-25 mcg inhaler Inhale 1 puff into the lungs once daily.    lisinopriL (PRINIVIL,ZESTRIL) 20 MG tablet Take 1 tablet (20 mg total) by mouth once daily.    melatonin 300 mcg Tab Take 300 mcg by mouth nightly as needed (insomnia). (Patient not taking: Reported on 2022)    VENTOLIN HFA 90 mcg/actuation inhaler INHALE 2 PUFFS INTO THE LUNGS EVERY 6 HOURS AS NEEDED FOR WHEEZING     Family History    None       Tobacco Use    Smoking status: Former Smoker     Packs/day: 0.50     Types: Cigarettes     Quit date:      Years since quittin.3    Smokeless tobacco: Never Used    Tobacco comment: 8 months ago   Substance and Sexual Activity    Alcohol use: No    Drug use: Not on file    Sexual activity: Not on file     Review of Systems   Unable to perform ROS: Mental status change   Objective:     Vital Signs (Most Recent):  Temp: 97.8 °F (36.6 °C) (22 1002)  Pulse: 94 (22 164)  Resp: 18 (22 1002)  BP: (!) 185/88 (22 164)  SpO2: 96 % (22)   Vital Signs (24h  Range):  Temp:  [97.8 °F (36.6 °C)] 97.8 °F (36.6 °C)  Pulse:  [84-94] 94  Resp:  [18] 18  SpO2:  [95 %-100 %] 96 %  BP: (159-185)/() 185/88     Weight: 72.1 kg (159 lb)  Body mass index is 30.04 kg/m².    Physical Exam  Vitals and nursing note reviewed.   Constitutional:       General: She is not in acute distress.     Appearance: She is well-developed. She is not diaphoretic.   HENT:      Head: Normocephalic and atraumatic.      Right Ear: External ear normal.      Left Ear: External ear normal.      Nose: Nose normal.   Eyes:      General: No scleral icterus.        Right eye: No discharge.         Left eye: No discharge.      Conjunctiva/sclera: Conjunctivae normal.      Pupils: Pupils are equal, round, and reactive to light.   Neck:      Thyroid: No thyromegaly.   Cardiovascular:      Rate and Rhythm: Normal rate and regular rhythm.      Heart sounds: Normal heart sounds. No murmur heard.  Pulmonary:      Effort: Pulmonary effort is normal. No respiratory distress.      Breath sounds: Normal breath sounds. No stridor. No wheezing or rales.   Abdominal:      General: Bowel sounds are normal. There is no distension.      Palpations: Abdomen is soft.      Tenderness: There is abdominal tenderness.   Musculoskeletal:         General: No tenderness.      Cervical back: Normal range of motion and neck supple.   Lymphadenopathy:      Cervical: No cervical adenopathy.   Skin:     General: Skin is warm and dry.      Capillary Refill: Capillary refill takes less than 2 seconds.      Findings: No erythema or rash.   Neurological:      General: No focal deficit present.      Mental Status: She is alert. She is disoriented.      Cranial Nerves: No cranial nerve deficit.      Motor: No abnormal muscle tone.      Comments: Unable to do complete neuro exam due altered mental status   Psychiatric:         Attention and Perception: She is inattentive.         Mood and Affect: Affect is flat.         Speech: Speech is  delayed and tangential.         Behavior: Behavior is cooperative.         CRANIAL NERVES     CN III, IV, VI   Pupils are equal, round, and reactive to light.     Significant Labs: All pertinent labs within the past 24 hours have been reviewed.  CBC:   Recent Labs   Lab 05/08/22  1046   WBC 13.26*   HGB 15.0   HCT 46.9        CMP:   Recent Labs   Lab 05/08/22  1046      K 4.5      CO2 24   *   BUN 15   CREATININE 0.8   CALCIUM 9.5   PROT 7.4   ALBUMIN 3.8   BILITOT 0.4   ALKPHOS 67   AST 21   ALT 33   ANIONGAP 12   EGFRNONAA >60       Significant Imaging: I have reviewed all pertinent imaging results/findings within the past 24 hours.

## 2022-05-09 VITALS
SYSTOLIC BLOOD PRESSURE: 125 MMHG | DIASTOLIC BLOOD PRESSURE: 85 MMHG | WEIGHT: 159 LBS | OXYGEN SATURATION: 94 % | HEART RATE: 101 BPM | BODY MASS INDEX: 30.02 KG/M2 | HEIGHT: 61 IN | RESPIRATION RATE: 18 BRPM | TEMPERATURE: 97 F

## 2022-05-09 LAB
ANION GAP SERPL CALC-SCNC: 11 MMOL/L (ref 8–16)
BASOPHILS # BLD AUTO: 0.05 K/UL (ref 0–0.2)
BASOPHILS NFR BLD: 0.4 % (ref 0–1.9)
BUN SERPL-MCNC: 14 MG/DL (ref 8–23)
CALCIUM SERPL-MCNC: 8.5 MG/DL (ref 8.7–10.5)
CHLORIDE SERPL-SCNC: 105 MMOL/L (ref 95–110)
CO2 SERPL-SCNC: 24 MMOL/L (ref 23–29)
CREAT SERPL-MCNC: 0.8 MG/DL (ref 0.5–1.4)
DIFFERENTIAL METHOD: ABNORMAL
EOSINOPHIL # BLD AUTO: 0 K/UL (ref 0–0.5)
EOSINOPHIL NFR BLD: 0.3 % (ref 0–8)
ERYTHROCYTE [DISTWIDTH] IN BLOOD BY AUTOMATED COUNT: 13.6 % (ref 11.5–14.5)
EST. GFR  (AFRICAN AMERICAN): >60 ML/MIN/1.73 M^2
EST. GFR  (NON AFRICAN AMERICAN): >60 ML/MIN/1.73 M^2
FOLATE SERPL-MCNC: 19.7 NG/ML (ref 4–24)
GLUCOSE SERPL-MCNC: 94 MG/DL (ref 70–110)
HCT VFR BLD AUTO: 43.9 % (ref 37–48.5)
HGB BLD-MCNC: 14.4 G/DL (ref 12–16)
IMM GRANULOCYTES # BLD AUTO: 0.05 K/UL (ref 0–0.04)
IMM GRANULOCYTES NFR BLD AUTO: 0.4 % (ref 0–0.5)
LACTATE SERPL-SCNC: 1 MMOL/L (ref 0.5–2.2)
LYMPHOCYTES # BLD AUTO: 2.4 K/UL (ref 1–4.8)
LYMPHOCYTES NFR BLD: 20.6 % (ref 18–48)
MCH RBC QN AUTO: 28.3 PG (ref 27–31)
MCHC RBC AUTO-ENTMCNC: 32.8 G/DL (ref 32–36)
MCV RBC AUTO: 86 FL (ref 82–98)
MONOCYTES # BLD AUTO: 0.9 K/UL (ref 0.3–1)
MONOCYTES NFR BLD: 7.3 % (ref 4–15)
NEUTROPHILS # BLD AUTO: 8.2 K/UL (ref 1.8–7.7)
NEUTROPHILS NFR BLD: 71 % (ref 38–73)
NRBC BLD-RTO: 0 /100 WBC
PLATELET # BLD AUTO: 365 K/UL (ref 150–450)
PMV BLD AUTO: 10.4 FL (ref 9.2–12.9)
POCT GLUCOSE: 101 MG/DL (ref 70–110)
POCT GLUCOSE: 109 MG/DL (ref 70–110)
POTASSIUM SERPL-SCNC: 4.2 MMOL/L (ref 3.5–5.1)
PROCALCITONIN SERPL IA-MCNC: <0.02 NG/ML
RBC # BLD AUTO: 5.08 M/UL (ref 4–5.4)
SODIUM SERPL-SCNC: 140 MMOL/L (ref 136–145)
VIT B12 SERPL-MCNC: 981 PG/ML (ref 210–950)
WBC # BLD AUTO: 11.58 K/UL (ref 3.9–12.7)

## 2022-05-09 PROCEDURE — 95819 EEG AWAKE AND ASLEEP: CPT

## 2022-05-09 PROCEDURE — 36415 COLL VENOUS BLD VENIPUNCTURE: CPT | Performed by: INTERNAL MEDICINE

## 2022-05-09 PROCEDURE — 82607 VITAMIN B-12: CPT | Performed by: INTERNAL MEDICINE

## 2022-05-09 PROCEDURE — 95819 EEG AWAKE AND ASLEEP: CPT | Mod: 26,,, | Performed by: PSYCHIATRY & NEUROLOGY

## 2022-05-09 PROCEDURE — 97161 PT EVAL LOW COMPLEX 20 MIN: CPT

## 2022-05-09 PROCEDURE — G0378 HOSPITAL OBSERVATION PER HR: HCPCS

## 2022-05-09 PROCEDURE — 97535 SELF CARE MNGMENT TRAINING: CPT

## 2022-05-09 PROCEDURE — A4216 STERILE WATER/SALINE, 10 ML: HCPCS | Performed by: INTERNAL MEDICINE

## 2022-05-09 PROCEDURE — 82746 ASSAY OF FOLIC ACID SERUM: CPT | Performed by: INTERNAL MEDICINE

## 2022-05-09 PROCEDURE — 84425 ASSAY OF VITAMIN B-1: CPT | Performed by: INTERNAL MEDICINE

## 2022-05-09 PROCEDURE — 83605 ASSAY OF LACTIC ACID: CPT | Performed by: INTERNAL MEDICINE

## 2022-05-09 PROCEDURE — 97165 OT EVAL LOW COMPLEX 30 MIN: CPT

## 2022-05-09 PROCEDURE — 84145 PROCALCITONIN (PCT): CPT | Performed by: INTERNAL MEDICINE

## 2022-05-09 PROCEDURE — 80048 BASIC METABOLIC PNL TOTAL CA: CPT | Performed by: INTERNAL MEDICINE

## 2022-05-09 PROCEDURE — 85025 COMPLETE CBC W/AUTO DIFF WBC: CPT | Performed by: INTERNAL MEDICINE

## 2022-05-09 PROCEDURE — 94761 N-INVAS EAR/PLS OXIMETRY MLT: CPT

## 2022-05-09 PROCEDURE — 95819 PR EEG,W/AWAKE & ASLEEP RECORD: ICD-10-PCS | Mod: 26,,, | Performed by: PSYCHIATRY & NEUROLOGY

## 2022-05-09 PROCEDURE — 25000003 PHARM REV CODE 250: Performed by: INTERNAL MEDICINE

## 2022-05-09 RX ORDER — ASPIRIN 81 MG/1
81 TABLET ORAL DAILY
Qty: 30 TABLET | Refills: 0 | Status: SHIPPED | OUTPATIENT
Start: 2022-05-10 | End: 2023-09-20

## 2022-05-09 RX ADMIN — LISINOPRIL 20 MG: 10 TABLET ORAL at 08:05

## 2022-05-09 RX ADMIN — ASPIRIN 81 MG: 81 TABLET, COATED ORAL at 08:05

## 2022-05-09 RX ADMIN — Medication 10 ML: at 06:05

## 2022-05-09 NOTE — PLAN OF CARE
05/09/22 1134   Medicare Message   Important Message from Medicare regarding Discharge Appeal Rights Signed/date by patient/caregiver;Explained to patient/caregiver   Date IMM was signed 05/09/22   Time IMM was signed 1135

## 2022-05-09 NOTE — PLAN OF CARE
ANA MARÍA Schmitt CM obtained choice.  DUNIA sent medical records and HH order to [facility] via Robert Applebaum MD.     05/09/22 4895   Post-Acute Status   Post-Acute Authorization Home Health   Home Health Status Referrals Sent

## 2022-05-09 NOTE — PROGRESS NOTES
[facility]  Progress Note  Date: 2022 9:18 AM            Patient Name: Gayathri Mack   MRN: [id number]   : 1940    AGE: 81 y.o.    LOS: 0 days Hospital Day: 2  Admit date: 2022  9:56 AM         HPI per EMR: Gayathri Mack is a 81 y.o. female with a history of HTN and COPD who presents to the ED with insidious onset of Nausea vomiting since yesterday afternoon accompanied with general weakness and poor appetite. Patient's daughter reports patient had a throat biopsy procedure 2 days ago. She mentions patient had been reporting throat pain since the procedure. The daughter states patient had similar symptoms a few months ago when patient had a stomach virus. Patient has been confused and not herself since yesterday.  The patient is confused, poor historian. Daughter  denies any fever, CP, shortness of breath, or any other symptoms at this time. No witnessed seizure.  No pertinent PSHx. Patient is a former smoker     Neurology consult: Patient was seen and examined by me. She is hard of hearing and difficult to obtain history. She is oriented x3. Her daughter states that she has had Nausea and vomiting since two days. The daughter states patient had similar symptoms a few months ago when patient had a stomach virus. She also states that patient was confused in the last 1-2 days and was not able to perform most of her ADLs.      She denies any other new symptoms including headache, vision loss, nausea, vomiting, unilateral weakness and sensory changes.     2022: No acute events overnight. Patient was seen and examined by me this morning. Neuro exam remains to be same.  Patient states that she feels much better today.  She has no focal deficits at this time.       Vitals:  Patient Vitals for the past 24 hrs:   BP Temp Temp src Pulse Resp SpO2 Height Weight   22 0741 133/89 97.5 °F (36.4 °C) -- 79 19 96 % -- --   22 0438 117/87 96.9 °F (36.1 °C) -- 85 18 96 % -- --  "  05/08/22 2338 130/69 96.9 °F (36.1 °C) -- 74 18 (!) 94 % -- --   05/08/22 2042 -- -- -- -- -- (!) 94 % -- --   05/08/22 2010 (!) 142/71 97.6 °F (36.4 °C) Oral 76 18 95 % -- --   05/08/22 1647 (!) 185/88 98.4 °F (36.9 °C) Oral 94 18 96 % -- --   05/08/22 1447 (!) 180/97 -- -- 90 -- 95 % -- --   05/08/22 1435 -- -- -- 88 -- 98 % -- --   05/08/22 1432 (!) 166/94 -- -- 91 -- 95 % -- --   05/08/22 1221 (!) 159/100 -- -- 86 -- 97 % -- --   05/08/22 1002 (!) 184/96 97.8 °F (36.6 °C) Oral 84 18 100 % 5' 1" (1.549 m) 72.1 kg (159 lb)     PHYSICAL EXAM:     GENERAL APPEARANCE: Well-developed, well-nourished female in no acute distress.  HEENT: Normocephalic and atraumatic. PERRL. Oropharynx unremarkable.  PULM: Comfortable on room air.  CV: RRR.  ABDOMEN: Soft, nontender.  EXTREMITIES: No signs of vascular compromise. Pulses present. No cyanosis, clubbing or edema.  SKIN: Clear; no rashes, lesions or skin breaks in exposed areas.      NEURO:   MENTAL STATUS: Patient awake and oriented to time, place, and person. Affect normal.  CRANIAL NERVES II-XII: Pupils equal, round and reactive to light. Extraocular movements full and intact. No facial asymmetry.  MOTOR: Normal bulk. Tone normal and symmetrical throughout.  No abnormal movements. No tremor.   Strength 5/5 throughout unless specified below.  REFLEXES: DTRs 2+; normal and symmetric throughout.   SENSATION: Sensation grossly intact to fine touch.  COORDINATION: Finger-to-nose normal for age and symmetric.  STATION: Romberg deferred.  GAIT: Deferred.    CURRENT SCHEDULED MEDICATIONS:   aspirin  81 mg Oral Daily    enoxaparin  40 mg Subcutaneous Daily    lisinopriL  20 mg Oral Daily    NON FORMULARY MEDICATION 1 application  1 application Inhalation Daily    senna-docusate 8.6-50 mg  1 tablet Oral BID    sodium chloride 0.9%  10 mL Intravenous Q8H     CURRENT INFUSIONS:    DATA:  Recent Labs   Lab 05/08/22  1046 05/09/22  0525    140   K 4.5 4.2    105 "   CO2 24 24   BUN 15 14   CREATININE 0.8 0.8   * 94   CALCIUM 9.5 8.5*   AST 21  --    ALT 33  --      Recent Labs   Lab 05/08/22  1046 05/09/22  0525   WBC 13.26* 11.58   HGB 15.0 14.4   HCT 46.9 43.9    365     No results found for: PROTEINCSF, GLUCCSF, WBCCSF, RBCCSF  No results found for: HGBA1C         I have personally reviewed and interpreted the pertinent imaging and lab results.  Imaging Results          X-Ray Abdomen AP 1 View (Final result)  Result time 05/09/22 09:09:33    Final result by Meliton Nguyen MD (05/09/22 09:09:33)                 Narrative:    EXAMINATION:  XR ABDOMEN AP 1 VIEW    CLINICAL HISTORY:  abd pain;    TECHNIQUE:  AP View(s) of the abdomen was performed.    COMPARISON:  Same-day CT    FINDINGS:  Nonobstructive bowel gas pattern.  Queried right renal stones better seen on CT.  Mild multilevel degenerative changes in the spine.  There is catheter tubing overlying the midline pelvis, possibly a Guerra.      Electronically signed by: Meliton Nguyen  Date:    05/09/2022  Time:    09:09                             CTA Head and Neck (xpd) (Final result)  Result time 05/08/22 15:40:22    Final result by Mallorie Soliman MD (05/08/22 15:40:22)                 Impression:      No significant stenosis.  No major vascular occlusion.      Electronically signed by: Mallorie Soliman MD  Date:    05/08/2022  Time:    15:40             Narrative:    EXAMINATION:  CTA HEAD AND NECK (XPD)    CLINICAL HISTORY:  Neuro deficit, acute, stroke suspected;    TECHNIQUE:  Non contrast low dose axial images were obtained through the head.  CT angiogram was performed from the level of the josefina to the top of the head following the IV administration of 75mL of Omnipaque 350.   Sagittal and coronal reconstructions and maximum intensity projection reconstructions were performed. Arterial stenosis percentages are based on NASCET measurement criteria.  Delayed contrast images were also  obtained    COMPARISON:  None    FINDINGS:  Visualized lung apices are expanded.  Emphysematous changes noted.  There is polypoid structure right maxillary sinus suggesting retention cyst and mucosal thickening.  There is postoperative change left middle ear cochlear implant causing some artifact.  Density in the left external auditory canal suggests remain.  There is some soft tissue density in the right middle ear.    There are multiple thyroid nodules including septated appearing cystic nodule the left lobe measuring 1.5 cm.  Patient has history of prior thyroid ultrasound 02/21/2022 and prior FNA and please refer to those reports.    The great vessels arise in normal sequence from the arch without significant proximal stenosis.  Both vertebral arteries are patent bilaterally without significant stenosis.  Left is larger than the right appearing dominant.    Right CCA/cervical ICA smooth and patent with out appreciable stenosis    Left CCA/cervical ICA; smooth and patent without appreciable stenosis.    Intracranial; intracranial vertebral arteries, basilar arteries intracranial internal carotid arteries patent without significant stenosis.  Anterior cerebral, posterior cerebral middle    Ventricles, sulci, fissures stable compared to the prior exam and no abnormal enhancement is seen.                               CT Head Without Contrast (Final result)  Result time 05/08/22 13:13:38    Final result by Mallorie Soliman MD (05/08/22 13:13:38)                 Impression:      Some image degradation with large amount of streaky metal artifact in this patient with cochlear implant.  As visualized no acute intracranial findings.      Electronically signed by: Mallorie Soliman MD  Date:    05/08/2022  Time:    13:13             Narrative:    EXAMINATION:  CT HEAD WITHOUT CONTRAST    CLINICAL HISTORY:  Mental status change, unknown cause;    TECHNIQUE:  Low dose axial images were obtained through the head.  Coronal and  sagittal reformations were also performed. Contrast was not administered.    COMPARISON:  None.    FINDINGS:  There is image degradation from large amount of streaky metal artifact from cochlear implant device.  As visualized no acute intracranial findings with no acute intracranial hemorrhage, no acute major vascular territory infarct no intracranial mass effect.  Ventricles, sulci, fissures unremarkable appearance for the patient's age.  Old lacunar infarct in the centrum semi ovale on the left and subtle hypodensity in white matter suggesting mild microvascular ischemic change.    There is opacification the right maxillary sinus suggesting mucosal thickening and retention cyst.  Slight opacification in the right middle ear.  Left cochlear implant.                               CT Abdomen Pelvis  Without Contrast (Final result)  Result time 05/08/22 12:38:24    Final result by Mallorie Soliman MD (05/08/22 12:38:24)                 Impression:      No acute findings seen in the abdomen or pelvis.    Diffuse fatty infiltration of the liver.  More focal low-density areas suggesting combination of small cyst and more focal fatty change although could be further evaluated with follow-up MRI or CT liver mass protocol.  Renal masses suggesting cysts.  Image degradation from motion limiting evaluation.  Small nonobstructing right renal stones.  Diverticulosis without CT findings of acute diverticulitis.      Electronically signed by: Mallorie Soliman MD  Date:    05/08/2022  Time:    12:38             Narrative:    EXAMINATION:  CT ABDOMEN PELVIS WITHOUT CONTRAST    CLINICAL HISTORY:  Nausea/vomiting;    TECHNIQUE:  Low dose axial images, sagittal and coronal reformations were obtained from the lung bases to the pubic symphysis.  No p.o. contrast    COMPARISON:  None    FINDINGS:  Mild dependent hypoventilatory changes in visualized lung bases.  Vascular calcifications including coronary artery calcifications.  Small  hiatal hernia.    Diffuse fatty infiltration of the liver.  9 mm hypodensity in the liver not fully characterized but suggest small cysts.  Less well-defined 1.5 cm hypodensity near the gallbladder fossa nonspecific and could represent more focal fatty change but cannot exclude mass.    No calcified stones in the gallbladder or CT findings of acute cholecystitis.  No biliary duct dilatation.    Spleen not enlarged    Adrenal glands unremarkable appearance    Pancreas mildly atrophied    Abdominal aorta no aneurysm    Stomach, bowel, mesentery; normal appearance of the appendix.  Tiny fat containing umbilical hernia noted.  Diverticulosis without CT findings of acute diverticulitis.  No free intraperitoneal air or fluid    Kidneys, ureters, bladder; a couple punctate right renal stones.  No opaque left renal stone.  Renal masses suggesting cysts including in the upper pole of the left kidney measuring 11 mm and in the lower pole of the right kidney measuring 3 cm and in the upper pole of the right kidney measuring 11 mm.  There is some image degradation from motion artifact including in the region the kidneys limiting evaluation.  No hydronephrosis opaque ureteral stone or ureteral obstruction is seen.  The urinary bladder is moderately distended at time of the exam and unremarkable appearance    Reproductive organs unremarkable appearance for the patient's age                                        ASSESSMENT AND PLAN:    Encephalopathy     Workup:   CTH: Artifact from metal however no obvious intracranial abnormality  CTA head neck: No significant stenosis.  No major vascular occlusion.  EEG: pending      Plan:  · Etiology of encephalopathy is unknown.   · Currently her mental status is normal.    · Rule out infectious and metabolic causes.   · Cannot get an MRI brain due to metal  · EEG done and pending read  · Checked Vitamin B1-pending, B12-normal, Folate normal.   · PT OT  · Neurochecks per unit protocol.    · Will continue to follow         36 minutes of care time has been spent evaluating with the patient. Time includes chart review not limited to diagnostic imaging, labs, and vitals, patient assessment, discussion with family and nursing, current order evaluations, and new order entries.      Gopal Gunderson MD  Neurology/vascular Neurology  Date of Service: 05/09/2022  9:18 AM    Please note: This note was transcribed using voice recognition software. Because of this technology there are often uinintended grammatical, spelling, and other transcription errors. Please disregard these errors.

## 2022-05-09 NOTE — PLAN OF CARE
[facility]  Initial Discharge Assessment       Primary Care Provider: Carlos Deal MD    Admission Diagnosis: Altered mental status [R41.82]    Admission Date: 5/8/2022  Expected Discharge Date:     Discharge Barriers Identified: None      Cm completed the assessment with pt at bedside. Pt lives with daughter and independent in care.  Demographic and PCP is current on face sheet. Pt denies POA/LW.  Denies hh and dme.  Disposition:  Pt will discharge to home. No needs verbalized at this time. Cm will continue following.    Payor: MEDICARE / Plan: MEDICARE PART A & B / Product Type: Government /     Extended Emergency Contact Information  Primary Emergency Contact: Beatrice Mack   [facility]  Home Phone: [phone]  Relation: Daughter    Discharge Plan A: Home with family  Discharge Plan B: Home with family      [facility] - [address], [address]  [address] [facility], [facility] [address]  [facility] [address]  Phone: [phone] Fax: [phone]      Initial Assessment (most recent)     Adult Discharge Assessment - 05/09/22 1130        Discharge Assessment    Assessment Type Discharge Planning Assessment     Confirmed/corrected address, phone number and insurance Yes     Confirmed Demographics Correct on Facesheet     Source of Information patient     Does patient/caregiver understand observation status Yes     Communicated CHANO with patient/caregiver Yes     Lives With child(tim), adult     Facility Arrived From: home     Do you expect to return to your current living situation? Yes     Do you have help at home or someone to help you manage your care at home? Yes     Who are your caregiver(s) and their phone number(s)? daughter- rodrigo - [phone]     Prior to hospitilization cognitive status: Alert/Oriented     Current cognitive status: Alert/Oriented     Walking or Climbing Stairs Difficulty none     Dressing/Bathing Difficulty none      Equipment Currently Used at Home none     Readmission within 30 days? No     Patient currently being followed by outpatient case management? No     Do you currently have service(s) that help you manage your care at home? No     Do you take prescription medications? Yes     Do you have prescription coverage? Yes     Do you have any problems affording any of your prescribed medications? No     Is the patient taking medications as prescribed? yes     Who is going to help you get home at discharge? daughter     How do you get to doctors appointments? family or friend will provide     Are you on dialysis? No     Do you take coumadin? No     Discharge Plan A Home with family     Discharge Plan B Home with family     DME Needed Upon Discharge  none     Discharge Plan discussed with: Patient     Discharge Barriers Identified None

## 2022-05-09 NOTE — CONSULTS
[facility]  Neurology  Consult Note        PATIENT NAME: Gayathri Mack  MRN: [id number]  CSN: [id number]      TODAY'S DATE: 05/08/2022  ADMIT DATE: 5/8/2022                            CONSULTING PROVIDER: Gopal Gunderson MD  CONSULT REQUESTED BY: Christin Love MD      Reason for consult: Encephalopathy       History obtained from chart review, the patient and family at bedside.    HPI per EMR: Gayathri Mack is a 81 y.o. female with a history of HTN and COPD who presents to the ED with insidious onset of Nausea vomiting since yesterday afternoon accompanied with general weakness and poor appetite. Patient's daughter reports patient had a throat biopsy procedure 2 days ago. She mentions patient had been reporting throat pain since the procedure. The daughter states patient had similar symptoms a few months ago when patient had a stomach virus. Patient has been confused and not herself since yesterday.  The patient is confused, poor historian. Daughter  denies any fever, CP, shortness of breath, or any other symptoms at this time. No witnessed seizure.  No pertinent PSHx. Patient is a former smoker    Neurology consult: Patient was seen and examined by me. She is hard of hearing and difficult to obtain history. She is oriented x3. Her daughter states that she has had Nausea and vomiting since two days. The daughter states patient had similar symptoms a few months ago when patient had a stomach virus. She also states that patient was confused in the last 1-2 days and was not able to perform most of her ADLs.     She denies any other new symptoms including headache, vision loss, nausea, vomiting, unilateral weakness and sensory changes.     PREVIOUS MEDICAL HISTORY:  Past Medical History:   Diagnosis Date    Alopecia     wear wigs     COPD (chronic obstructive pulmonary disease)     Hearing loss     bilateral     Hx of rickets     Hypertension     Osteoporosis     Skin cancer, basal cell       PREVIOUS SURGICAL HISTORY:  Past Surgical History:   Procedure Laterality Date    CATARACT EXTRACTION      bliteral    IMPLANTATION OF COCHLEAR PROSTHESIS Left      FAMILY MEDICAL HISTORY:  No family history on file.  SOCIAL HISTORY:  Social History     Tobacco Use    Smoking status: Former Smoker     Packs/day: 0.50     Types: Cigarettes     Quit date:      Years since quittin.3    Smokeless tobacco: Never Used    Tobacco comment: 8 months ago   Substance Use Topics    Alcohol use: No     ALLERGIES:  Review of patient's allergies indicates:  No Known Allergies  HOME MEDICATIONS:  Prior to Admission medications    Medication Sig Start Date End Date Taking? Authorizing Provider   alendronate (FOSAMAX) 70 MG tablet Take 1 tablet (70 mg total) by mouth every 7 days. 22   Carlos Deal MD   azelastine (ASTELIN) 137 mcg (0.1 %) nasal spray 1 spray (137 mcg total) by Nasal route 2 (two) times daily. 10/30/20 10/30/21  Izaiah Olivares PA-C   ezetimibe (ZETIA) 10 mg tablet Take 1 tablet (10 mg total) by mouth once daily. 22  Carlos Deal MD   fluticasone-umeclidin-vilanter (TRELEGY ELLIPTA) 200-62.5-25 mcg inhaler Inhale 1 puff into the lungs once daily. 22   Yolanda Albarran NP   lisinopriL (PRINIVIL,ZESTRIL) 20 MG tablet Take 1 tablet (20 mg total) by mouth once daily. 22   Carlos Deal MD   melatonin 300 mcg Tab Take 300 mcg by mouth nightly as needed (insomnia).  Patient not taking: Reported on 2022   Yolanda Albarran NP   VENTOLIN HFA 90 mcg/actuation inhaler INHALE 2 PUFFS INTO THE LUNGS EVERY 6 HOURS AS NEEDED FOR WHEEZING 21   Xavi De Los Santos PA-C     CURRENT SCHEDULED MEDICATIONS:   [START ON 2022] aspirin  81 mg Oral Daily    enoxaparin  40 mg Subcutaneous Daily    [START ON 2022] lisinopriL  20 mg Oral Daily    senna-docusate 8.6-50 mg  1 tablet Oral BID    sodium chloride 0.9%  10 mL Intravenous  "Q8H     CURRENT INFUSIONS:    CURRENT PRN MEDICATIONS:  acetaminophen, albuterol-ipratropium, dextrose 10%, dextrose 10%, glucagon (human recombinant), glucose, glucose, insulin aspart U-100, labetaloL, magnesium oxide, magnesium oxide, melatonin, naloxone, potassium bicarbonate, potassium bicarbonate, potassium bicarbonate, potassium, sodium phosphates, potassium, sodium phosphates, potassium, sodium phosphates    REVIEW OF SYSTEMS:  Please refer to the HPI for all pertinent positive and negative findings. A comprehensive review of all other systems was negative.       PHYSICAL EXAM:  Patient Vitals for the past 24 hrs:   BP Temp Temp src Pulse Resp SpO2 Height Weight   05/08/22 2010 (!) 142/71 97.6 °F (36.4 °C) Oral 76 18 95 % -- --   05/08/22 1647 (!) 185/88 98.4 °F (36.9 °C) Oral 94 18 96 % -- --   05/08/22 1447 (!) 180/97 -- -- 90 -- 95 % -- --   05/08/22 1435 -- -- -- 88 -- 98 % -- --   05/08/22 1432 (!) 166/94 -- -- 91 -- 95 % -- --   05/08/22 1221 (!) 159/100 -- -- 86 -- 97 % -- --   05/08/22 1002 (!) 184/96 97.8 °F (36.6 °C) Oral 84 18 100 % 5' 1" (1.549 m) 72.1 kg (159 lb)       GENERAL APPEARANCE: Alert, well-developed, well-nourished female in no acute distress.  HEENT: Normocephalic and atraumatic. PERRL. Oropharynx unremarkable.  PULM: Normal respiratory effort. No accessory muscle use.  CV: RRR.  ABDOMEN: Soft, nontender.  EXTREMITIES: No obvious signs of vascular compromise. Pulses present. No cyanosis, clubbing or edema.  SKIN: Clear; no rashes, lesions or skin breaks in exposed areas.    NEURO:  MENTAL STATUS: Patient awake and oriented to time, place, and person, recent/remote memory normal, attention span/concentration normal, speech fluent without paraphasic errors, good comprehension with appropriate thought content and fund of knowledge appropriate for patient's level of education.  Affect euthymic.    CRANIAL NERVES:  CN I: Not tested.  CN II: Fundoscopic exam deferred.  CN III, IV, VI: " Pupils equal, round and reactive to light.  Extraocular movements full and intact.  CN V: Facial sensation normal.  CN VII: Facial asymmetry absent.  CN VIII: Hearing grossly normal and equal bilaterally.  No skew deviation or pathologic nystagmus.  CN IX, X: Palate elevates symmetrically. Speech/articulation is clear without dysarthria.  CN XI: Shoulder shrug and chin rotation equal with good strength.  CN XII: Tongue protrusion midline.    MOTOR:  Bulk normal. Tone normal and symmetric throughout.  Abnormal movements absent.  Tremor: none present.  Strength 5/5 throughout.    REFLEXES:  DTRs 2+ throughout.  Plantar response downgoing bilaterally.  SENSATION:grossly intact throughout.  COORDINATION: normal finger-to-nose.  STATION: not tested.  GAIT: not tested.      Labs:  Recent Labs   Lab 05/08/22  1046      K 4.5      CO2 24   BUN 15   CREATININE 0.8   *   CALCIUM 9.5     Recent Labs   Lab 05/08/22  1046   WBC 13.26*   HGB 15.0   HCT 46.9        Recent Labs   Lab 05/08/22  1046   ALBUMIN 3.8   PROT 7.4   BILITOT 0.4   ALKPHOS 67   ALT 33   AST 21     No results found for: PT, PTT, INR  Lab Results   Component Value Date    TRIG 107 01/26/2022    HDL 65 01/26/2022    CHOLHDL 25.4 01/26/2022     No results found for: HGBA1C  No results found for: PROTEINCSF, GLUCCSF, WBCCSF    Imaging:  I have reviewed and interpreted the pertinent imaging and lab results.      CTA Head and Neck (xpd)  Narrative: EXAMINATION:  CTA HEAD AND NECK (XPD)    CLINICAL HISTORY:  Neuro deficit, acute, stroke suspected;    TECHNIQUE:  Non contrast low dose axial images were obtained through the head.  CT angiogram was performed from the level of the josefina to the top of the head following the IV administration of 75mL of Omnipaque 350.   Sagittal and coronal reconstructions and maximum intensity projection reconstructions were performed. Arterial stenosis percentages are based on NASCET measurement criteria.   Delayed contrast images were also obtained    COMPARISON:  None    FINDINGS:  Visualized lung apices are expanded.  Emphysematous changes noted.  There is polypoid structure right maxillary sinus suggesting retention cyst and mucosal thickening.  There is postoperative change left middle ear cochlear implant causing some artifact.  Density in the left external auditory canal suggests remain.  There is some soft tissue density in the right middle ear.    There are multiple thyroid nodules including septated appearing cystic nodule the left lobe measuring 1.5 cm.  Patient has history of prior thyroid ultrasound 02/21/2022 and prior FNA and please refer to those reports.    The great vessels arise in normal sequence from the arch without significant proximal stenosis.  Both vertebral arteries are patent bilaterally without significant stenosis.  Left is larger than the right appearing dominant.    Right CCA/cervical ICA smooth and patent with out appreciable stenosis    Left CCA/cervical ICA; smooth and patent without appreciable stenosis.    Intracranial; intracranial vertebral arteries, basilar arteries intracranial internal carotid arteries patent without significant stenosis.  Anterior cerebral, posterior cerebral middle    Ventricles, sulci, fissures stable compared to the prior exam and no abnormal enhancement is seen.  Impression: No significant stenosis.  No major vascular occlusion.    Electronically signed by: Mallorie Soliman MD  Date:    05/08/2022  Time:    15:40  CT Head Without Contrast  Narrative: EXAMINATION:  CT HEAD WITHOUT CONTRAST    CLINICAL HISTORY:  Mental status change, unknown cause;    TECHNIQUE:  Low dose axial images were obtained through the head.  Coronal and sagittal reformations were also performed. Contrast was not administered.    COMPARISON:  None.    FINDINGS:  There is image degradation from large amount of streaky metal artifact from cochlear implant device.  As visualized no acute  intracranial findings with no acute intracranial hemorrhage, no acute major vascular territory infarct no intracranial mass effect.  Ventricles, sulci, fissures unremarkable appearance for the patient's age.  Old lacunar infarct in the centrum semi ovale on the left and subtle hypodensity in white matter suggesting mild microvascular ischemic change.    There is opacification the right maxillary sinus suggesting mucosal thickening and retention cyst.  Slight opacification in the right middle ear.  Left cochlear implant.  Impression: Some image degradation with large amount of streaky metal artifact in this patient with cochlear implant.  As visualized no acute intracranial findings.    Electronically signed by: Mallorie Soliman MD  Date:    05/08/2022  Time:    13:13  CT Abdomen Pelvis  Without Contrast  Narrative: EXAMINATION:  CT ABDOMEN PELVIS WITHOUT CONTRAST    CLINICAL HISTORY:  Nausea/vomiting;    TECHNIQUE:  Low dose axial images, sagittal and coronal reformations were obtained from the lung bases to the pubic symphysis.  No p.o. contrast    COMPARISON:  None    FINDINGS:  Mild dependent hypoventilatory changes in visualized lung bases.  Vascular calcifications including coronary artery calcifications.  Small hiatal hernia.    Diffuse fatty infiltration of the liver.  9 mm hypodensity in the liver not fully characterized but suggest small cysts.  Less well-defined 1.5 cm hypodensity near the gallbladder fossa nonspecific and could represent more focal fatty change but cannot exclude mass.    No calcified stones in the gallbladder or CT findings of acute cholecystitis.  No biliary duct dilatation.    Spleen not enlarged    Adrenal glands unremarkable appearance    Pancreas mildly atrophied    Abdominal aorta no aneurysm    Stomach, bowel, mesentery; normal appearance of the appendix.  Tiny fat containing umbilical hernia noted.  Diverticulosis without CT findings of acute diverticulitis.  No free  intraperitoneal air or fluid    Kidneys, ureters, bladder; a couple punctate right renal stones.  No opaque left renal stone.  Renal masses suggesting cysts including in the upper pole of the left kidney measuring 11 mm and in the lower pole of the right kidney measuring 3 cm and in the upper pole of the right kidney measuring 11 mm.  There is some image degradation from motion artifact including in the region the kidneys limiting evaluation.  No hydronephrosis opaque ureteral stone or ureteral obstruction is seen.  The urinary bladder is moderately distended at time of the exam and unremarkable appearance    Reproductive organs unremarkable appearance for the patient's age  Impression: No acute findings seen in the abdomen or pelvis.    Diffuse fatty infiltration of the liver.  More focal low-density areas suggesting combination of small cyst and more focal fatty change although could be further evaluated with follow-up MRI or CT liver mass protocol.  Renal masses suggesting cysts.  Image degradation from motion limiting evaluation.  Small nonobstructing right renal stones.  Diverticulosis without CT findings of acute diverticulitis.    Electronically signed by: Mallorie Soliman MD  Date:    05/08/2022  Time:    12:38         ASSESSMENT & PLAN:    Encephalopathy    Workup:   CTH: Artifact from metal however no obvious intracranial abnormality  CTA head neck: No significant stenosis.  No major vascular occlusion.  EEG: pending     Plan:  · Etiology of encephalopathy is unknown.   · Currently her mental status is normal.    · Rule out infectious and metabolic causes. Her WBC count is 13K. Infectious workup per primary team.  · Cannot get an MRI brain due to metal  · Will get EEG  · Check Vitamin B1, B12, Folate.   · PT OT  · Neurochecks per unit protocol.   · Will continue to follow      Thank you kindly for including us in the care of this patient. Please do not hesitate to contact us with any questions.        45  minutes of care time has been spent evaluating with the patient. Time includes chart review not limited to diagnostic imaging, labs, and vitals, patient assessment, discussion with family and nursing, current order evaluations, and new order entries.       Gopal Gunderson MD  Neurology/vascular Neurology  Date of Service: 05/08/2022  8:40 PM        Please note: This note was transcribed using voice recognition software. Because of this technology there are often uinintended grammatical, spelling, and other transcription errors. Please disregard these errors.

## 2022-05-09 NOTE — PROCEDURES
Routine EEG Report    Gayathri Mack  [id number]  1940    DATE OF SERVICE: 5/9/2022  REASON FOR CONSULT:  81-year-old woman admitted with nausea/vomiting, generalized weakness, and confusion.  Evaluate for evidence of epileptiform activity.    METHODOLOGY   Electroencephalographic (EEG) recording is with electrodes placed according to the International 10-20 placement system.  Thirty two (32) channels of digital signal (sampling rate of 512/sec) including T1 and T2 was simultaneously recorded from the scalp and may include  EKG, EMG, and/or eye monitors.  Recording band pass was 0.1 to 512 hz.  Digital video recording of the patient is simultaneously recorded with the EEG.  The patient is instructed report clinical symptoms which may occur during the recording session.  EEG and video recording is stored and archived in digital format. Activation procedures which include photic stimulation, hyperventilation and instructing patients to perform simple task are done in selected patients.    The EEG is displayed on a monitor screen and can be reviewed using different montages.  Computer assisted analysis is employed to detect spike and electrographic seizure activity.   The entire record is submitted for computer analysis.  The entire recording is visually reviewed and the times identified by computer analysis as being spikes or seizures are reviewed again.  Compresses spectral analysis (CSA) is also performed on the activity recorded from each individual channel.  This is displayed as a power display of frequencies from 0 to 30 Hz over time.   The CSA is reviewed looking for asymmetries in power between homologous areas of the scalp and then compared with the original EEG recording.     [employer] software is also utilized in the review of this study.  This software suite analyzes the EEG recording in multiple domains.  Coherence and rhythmicity is computed to identify EEG sections which may contain organized seizures.   Each channel undergoes analysis to detect presence of spike and sharp waves which have special and morphological characteristic of epileptic activity.  The routine EEG recording is converted from spacial into frequency domain.  This is then displayed comparing homologous areas to identify areas of significant asymmetry.  Algorithm to identify non-cortically generated artifact is used to separate eye movement, EMG and other artifact from the EEG.      EEG FINDINGS  Background activity:   The background is continuous predominantly mixed frequency alpha/theta activity.  There is plenty of intermittent polymorphic generalized and multifocal slowing seen bilaterally.  There is frontally predominant intermittent rhythmic delta activity (FIRDA).  The posterior dominant rhythm is generally between 7 and 8 hz.    Sleep:  There are periods with moderately well-formed sleep architecture.    Activation procedures:   The patient is on room air, awake, and talking but extremely hard of hearing so is unable to answer simple orientation questions or follow commands.    Cardiac Monitor:   Heart rate appears generally regular on a single lead EKG.    Impression:   This is an abnormal awake and asleep routine EEG because of generalized background slowing consistent with a mild-moderate encephalopathy with evidence of subcortical/deep midline dysfunction.  There are no epileptiform discharges and no electrographic seizures.    Lisa Hoffman MD PhD  Neurology-Epilepsy  [facility].

## 2022-05-09 NOTE — PT/OT/SLP EVAL
Occupational Therapy   Evaluation and Discharge Note    Name: Gayathri Mack  MRN: [id number]  Admitting Diagnosis:  Acute encephalopathy   Recent Surgery: * No surgery found *      Recommendations:     Discharge Recommendations: home  Discharge Equipment Recommendations:  walker, rolling  Barriers to discharge:  None    Assessment:     Gayathri Mack is a 81 y.o. female with a medical diagnosis of Acute encephalopathy. At this time, patient is modified independent with ADLs. Patient does not require further acute OT services.     Plan:     During this hospitalization, patient does not require further acute OT services.  Please re-consult if situation changes.    · Plan of Care Reviewed with: patient    Subjective     Chief Complaint: headache pain  Patient/Family Comments/goals: none    Occupational Profile:  Living Environment: Patient lives with daughter in a [facility].   Previous level of function: Patient was independent with ADLs and mobility.   Equipment Used at home:  none  Assistance upon Discharge: Patient will receive assistance from daughter if needed.     Pain/Comfort:  · Pain Rating 1: 5/10  · Location - Side 1: Left  · Location - Orientation 1: posterior  · Location 1: head  · Pain Addressed 1: Reposition, Distraction  · Pain Rating Post-Intervention 1: 5/10    Patients cultural, spiritual, Baptism conflicts given the current situation:      Objective:     Communicated with: nurse Tobias prior to session.  Patient found HOB elevated with telemetry upon OT entry to room.    General Precautions: Standard, fall   Orthopedic Precautions:N/A   Braces: N/A  Respiratory Status: Room air     Occupational Performance:    Bed Mobility:    · Patient completed Scooting/Bridging with supervision  · Patient completed Supine to Sit with supervision  · Patient completed Sit to Supine with supervision    Functional Mobility/Transfers:  · Patient completed Sit <> Stand Transfer with supervision  with  rolling walker    · Patient completed Toilet Transfer Stand Pivot technique with supervision with  rolling walker    Activities of Daily Living:  · Grooming: modified independence with oral and facial hygiene while standing at sink  · Lower Body Dressing: modified independence to don/doff socks while seated EOB  · Toileting: modified independence with performing BM hygiene while seated on toilet    Cognitive/Visual Perceptual:  Cognitive/Psychosocial Skills:     -       Oriented to: x4   -       Follows Commands/attention:Follows multistep  commands  -       Communication: clear/fluent  -       Safety awareness/insight to disability: impaired   -       Mood/Affect/Coping skills/emotional control: Appropriate to situation and Cooperative  Visual/Perceptual:      -Intact     Physical Exam:  Postural examination/scapula alignment:    -       Rounded shoulders  -       Forward head  Upper Extremity Range of Motion:     -       Right Upper Extremity: WFL  -       Left Upper Extremity: WFL  Upper Extremity Strength:    -       Right Upper Extremity: WFL  -       Left Upper Extremity: WFL   Strength:    -       Right Upper Extremity: WFL  -       Left Upper Extremity: WFL  Fine Motor Coordination:    -       Intact  Gross motor coordination:   WFL    AMPAC 6 Click ADL:  AMPAC Total Score: 24    Treatment & Education:  OT ed pt on OT role & POC as well as discharge recommendations.  OT ed patient on safety with walker use for functional mobility with cues for hand placement & sequencing.     Education:    Patient left up in chair with all lines intact, call button in reach, chair alarm on and nurse notified    GOALS:   Multidisciplinary Problems     Occupational Therapy Goals     Not on file                History:     Past Medical History:   Diagnosis Date    Alopecia     wear wigs     COPD (chronic obstructive pulmonary disease)     Hearing loss     bilateral     Hx of rickets     Hypertension     Osteoporosis     Skin cancer,  basal cell        Past Surgical History:   Procedure Laterality Date    CATARACT EXTRACTION      bliteral    IMPLANTATION OF COCHLEAR PROSTHESIS Left 2007       Time Tracking:     OT Date of Treatment: 05/09/22  OT Start Time: 0951  OT Stop Time: 1029  OT Total Time (min): 38 min    Billable Minutes:Evaluation 10  Self Care/Home Management 28    5/9/2022

## 2022-05-09 NOTE — PLAN OF CARE
Pt is alert to self. Family at bedside. Blood sugar monitored. Guerra care provided. Suresh Passed. Diabetic diet. Saline lock. Tele monitoring. NSR. Call light within reach. Bed alarm on.

## 2022-05-09 NOTE — PT/OT/SLP EVAL
"Physical Therapy Evaluation    Patient Name:  Gayathri Mack   MRN:  [id number]    Recommendations:     Discharge Recommendations:  home health PT (patient would benefit from 24/7 supervision, but notes her daughter works during the day)   Discharge Equipment Recommendations: walker, rolling   Barriers to discharge: None    Assessment:     Gayathri Mack is a 81 y.o. female admitted with a medical diagnosis of Acute encephalopathy.  She presents with the following impairments/functional limitations:  weakness, impaired endurance, impaired functional mobilty, gait instability, impaired balance, decreased lower extremity function, decreased safety awareness. Patient sitting up in chair upon entering the room and is agreeable to participation with PT evaluation. She denies pain at rest. She is A/O x4. She notes being independent at baseline and does not use an AD for ambulation. She requires SBA for sit to stand transfer. She ambulated 180' with RW, CGA, assistance with turns, and VC for sequencing. She is agreeable to remain sitting up in chair for lunch with chair alarm on and RN notified.     Rehab Prognosis: Good; patient would benefit from acute skilled PT services to address these deficits and reach maximum level of function.    Recent Surgery: * No surgery found *      Plan:     During this hospitalization, patient to be seen 6 x/week to address the identified rehab impairments via gait training, therapeutic activities, therapeutic exercises and progress toward the following goals:    · Plan of Care Expires:  06/09/22    Subjective     Chief Complaint: "I feel a hundred percent better than when I got here"  Patient/Family Comments/goals: agrees to walk   Pain/Comfort:  · Pain Rating 1: 0/10    Patients cultural, spiritual, Quaker conflicts given the current situation:      Living Environment:  Patient's daughter lives with her in a single story duplex with no ISIDRO. Her daughter is an  and gone during " the day.   Prior to admission, patients level of function was independent. She denies any recent falls or PT.   Equipment used at home: none.  DME owned (not currently used): none.  Upon discharge, patient will have assistance from HHPT and daughter.    Objective:     Communicated with ANA MARÍA Tobias prior to session.  Patient found up in chair with chair check, telemetry  upon PT entry to room.    General Precautions: Standard, fall   Orthopedic Precautions:N/A   Braces: N/A  Respiratory Status: Room air    Exams:  · Cognitive Exam:  Patient is oriented to Person, Place, Time and Situation  · RLE Strength: WFL  · LLE Strength: WFL    Functional Mobility:  · Transfers:     · Sit to Stand:  stand by assistance with no AD  · Gait: 180' with RW, CGA, assistance with turns, and VC for sequencing    Therapeutic Activities and Exercises:   Patient was educated on the importance of OOB activity and functional mobility to negate negative effects of prolonged bed rest during hospitalization, safe transfers and ambulation, HHPT, RW use, and D/C planning     AM-PAC 6 CLICK MOBILITY  Total Score:18     Patient left up in chair with all lines intact, call button in reach, chair alarm on and RN notified.    GOALS:   Multidisciplinary Problems     Physical Therapy Goals        Problem: Physical Therapy    Goal Priority Disciplines Outcome Goal Variances Interventions   Physical Therapy Goal     PT, PT/OT      Description: Goals to be met by: 22    Patient will increase functional independence with mobility by performin. Supine to sit with Supervision  2. Sit to stand transfer with Supervision  3. Bed to chair transfer with Supervision using Rolling Walker  4. Gait  x 250 feet with Supervision using Rolling Walker.   5. Lower extremity exercise program x20 reps per handout, with supervision                   History:     Past Medical History:   Diagnosis Date    Alopecia     wear wigs     COPD (chronic obstructive  pulmonary disease)     Hearing loss     bilateral     Hx of rickets     Hypertension     Osteoporosis     Skin cancer, basal cell        Past Surgical History:   Procedure Laterality Date    CATARACT EXTRACTION      bliteral    IMPLANTATION OF COCHLEAR PROSTHESIS Left 2007       Time Tracking:     PT Received On: 05/09/22  PT Start Time: 1102     PT Stop Time: 1115  PT Total Time (min): 13 min     Billable Minutes: Evaluation 13      05/09/2022

## 2022-05-09 NOTE — PLAN OF CARE
[facility]      HOME HEALTH ORDERS  FACE TO FACE ENCOUNTER    Patient Name: Gayathri Mack  YOB: 1940    PCP: Carlos Deal MD   PCP Address: [address] / [place] [address]  PCP Phone Number: [phone]  PCP Fax: [phone]    Encounter Date: 5/8/22    Admit to Home Health    Diagnoses:  Active Hospital Problems    Diagnosis  POA    *Acute encephalopathy [G93.40]  Yes    Uncontrolled hypertension [I10]  Yes    Chronic obstructive pulmonary disease [J44.9]  Yes     68 %       Osteoporosis [M81.0]  Yes    Essential hypertension [I10]  Yes    Vitamin D deficiency [E55.9]  Yes      Resolved Hospital Problems   No resolved problems to display.       Follow Up Appointments:  Future Appointments   Date Time Provider Department Center   5/13/2022  3:00 PM Yolanda Albarran NP [facility] [facility]   10/19/2022 11:00 AM LAB, [facility] [place]   10/26/2022 11:00 AM JIMMY Gutiérrez [facility]CR[facility] [place]       Allergies:Review of patient's allergies indicates:  No Known Allergies    Medications: Review discharge medications with patient and family and provide education.    Current Facility-Administered Medications   Medication Dose Route Frequency Provider Last Rate Last Admin    acetaminophen tablet 650 mg  650 mg Oral Q4H PRN Christin Love MD        albuterol-ipratropium 2.5 mg-0.5 mg/3 mL nebulizer solution 3 mL  3 mL Nebulization Q6H PRN Christin Love MD        aspirin EC tablet 81 mg  81 mg Oral Daily Christin Love MD   81 mg at 05/09/22 0822    dextrose 10% bolus 125 mL  12.5 g Intravenous PRN Christin Love MD        dextrose 10% bolus 250 mL  25 g Intravenous PRN Christin Love MD        enoxaparin injection 40 mg  40 mg Subcutaneous Daily Christin Love MD   40 mg at 05/08/22 2034    glucagon (human recombinant) injection 1 mg  1 mg Intramuscular PRN Christin Love MD        glucose chewable  tablet 16 g  16 g Oral PRN Christin Love MD        glucose chewable tablet 24 g  24 g Oral PRN Christin Love MD        insulin aspart U-100 pen 0-5 Units  0-5 Units Subcutaneous QID (AC + HS) PRN Christin Love MD        labetaloL injection 10 mg  10 mg Intravenous Q4H PRROXIE Love MD        lisinopriL tablet 20 mg  20 mg Oral Daily Christin Love MD   20 mg at 05/09/22 0822    magnesium oxide tablet 800 mg  800 mg Oral PRN Christin Love MD        magnesium oxide tablet 800 mg  800 mg Oral PRN Christin Love MD        melatonin tablet 6 mg  6 mg Oral Nightly PRN Christin Love MD        naloxone 0.4 mg/mL injection 0.02 mg  0.02 mg Intravenous PRN Christin Love MD        NON FORMULARY MEDICATION 1 application  1 application Inhalation Daily Luis Alfredo Moy MD        potassium bicarbonate disintegrating tablet 35 mEq  35 mEq Oral PRN Christin Love MD        potassium bicarbonate disintegrating tablet 50 mEq  50 mEq Oral PRN Christin Love MD        potassium bicarbonate disintegrating tablet 60 mEq  60 mEq Oral PRN Christin Love MD        potassium, sodium phosphates 280-160-250 mg packet 2 packet  2 packet Oral PRN Christin Love MD        potassium, sodium phosphates 280-160-250 mg packet 2 packet  2 packet Oral PRN Christin Love MD        potassium, sodium phosphates 280-160-250 mg packet 2 packet  2 packet Oral PRN Christin Love MD        senna-docusate 8.6-50 mg per tablet 1 tablet  1 tablet Oral BID Christin Love MD   1 tablet at 05/08/22 2034    sodium chloride 0.9% flush 10 mL  10 mL Intravenous Q8H Christin Love MD   10 mL at 05/09/22 0602     Current Discharge Medication List      START taking these medications    Details   aspirin (ECOTRIN) 81 MG EC tablet Take 1 tablet (81 mg total) by mouth once daily.  Qty: 30 tablet, Refills: 0         CONTINUE these medications which have NOT CHANGED     Details   alendronate (FOSAMAX) 70 MG tablet Take 1 tablet (70 mg total) by mouth every 7 days.  Qty: 12 tablet, Refills: 3      azelastine (ASTELIN) 137 mcg (0.1 %) nasal spray 1 spray (137 mcg total) by Nasal route 2 (two) times daily.  Qty: 30 mL, Refills: 0    Associated Diagnoses: Acute bacterial bronchitis      ezetimibe (ZETIA) 10 mg tablet Take 1 tablet (10 mg total) by mouth once daily.  Qty: 90 tablet, Refills: 3      fluticasone-umeclidin-vilanter (TRELEGY ELLIPTA) 200-62.5-25 mcg inhaler Inhale 1 puff into the lungs once daily.  Qty: 60 each, Refills: 11    Associated Diagnoses: COPD with asthma      lisinopriL (PRINIVIL,ZESTRIL) 20 MG tablet Take 1 tablet (20 mg total) by mouth once daily.  Qty: 90 tablet, Refills: 2    Comments: .      melatonin 300 mcg Tab Take 300 mcg by mouth nightly as needed (insomnia).    Associated Diagnoses: Insomnia, unspecified type      VENTOLIN HFA 90 mcg/actuation inhaler INHALE 2 PUFFS INTO THE LUNGS EVERY 6 HOURS AS NEEDED FOR WHEEZING  Qty: 18 g, Refills: 3    Associated Diagnoses: Cough; Wheezing; Acute URI; History of COPD               I have seen and examined this patient within the last 30 days. My clinical findings that support the need for the home health skilled services and home bound status are the following:no   Weakness/numbness causing balance and gait disturbance due to Weakness/Debility making it taxing to leave home.  Requiring assistive device to leave home due to unsteady gait caused by  Weakness/Debility.     Diet:   cardiac diet    Labs:  n/a    Referrals/ Consults  Physical Therapy to evaluate and treat. Evaluate for home safety and equipment needs; Establish/upgrade home exercise program. Perform / instruct on therapeutic exercises, gait training, transfer training, and Range of Motion.  Occupational Therapy to evaluate and treat. Evaluate home environment for safety and equipment needs. Perform/Instruct on transfers, ADL training, ROM, and  therapeutic exercises.    Activities:   activity as tolerated    Nursing:   Agency to admit patient within 24 hours of hospital discharge unless specified on physician order or at patient request    SN to complete comprehensive assessment including routine vital signs. Instruct on disease process and s/s of complications to report to MD. Review/verify medication list sent home with the patient at time of discharge  and instruct patient/caregiver as needed. Frequency may be adjusted depending on start of care date.     Skilled nurse to perform up to 3 visits PRN for symptoms related to diagnosis    Notify MD if SBP > 160 or < 90; DBP > 90 or < 50; HR > 120 or < 50; Temp > 101; O2 < 88%; Other:       Ok to schedule additional visits based on staff availability and patient request on consecutive days within the home health episode.    When multiple disciplines ordered:    Start of Care occurs on Sunday - Wednesday schedule remaining discipline evaluations as ordered on separate consecutive days following the start of care.    Thursday SOC -schedule subsequent evaluations Friday and Monday the following week.     Friday - Saturday SOC - schedule subsequent discipline evaluations on consecutive days starting Monday of the following week.    For all post-discharge communication and subsequent orders please contact patient's primary care physician.     Miscellaneous   n/a    Home Health Aide:  Physical Therapy Three times weekly, Occupational Therapy Three times weekly and Home Health Aide Three times weekly    Wound Care Orders  no    I certify that this patient is confined to her home and needs physical therapy and occupational therapy.

## 2022-05-09 NOTE — PROGRESS NOTES
Pt up to room 202 daughter at bedside no s/s of distress breathing unlabored. Pt verbalized no concerns at this time. call light in reach fall precautions in place. Endorsed to oncoming nurse.

## 2022-05-09 NOTE — PLAN OF CARE
Cm called EEG department to follow-up on pt's eeg ordered on Sunday. [employer] will be on the floor shortly. Cm will continue following.

## 2022-05-09 NOTE — PLAN OF CARE
[facility] Ok to pull rolling walker form closet. Cm delivered the rw and pt signed the delivery ticket.       05/09/22 9233   Post-Acute Status   Post-Acute Authorization HME   HME Status Set-up Complete/Auth obtained

## 2022-05-09 NOTE — PLAN OF CARE
Pt is cleared from  for discharge.    [facility]        05/09/22 3796   Final Note   Assessment Type Final Discharge Note   Anticipated Discharge Disposition Home-Health   Hospital Resources/Appts/Education Provided   (please call for appointment-office closed)

## 2022-05-10 ENCOUNTER — TELEPHONE (OUTPATIENT)
Dept: FAMILY MEDICINE | Facility: CLINIC | Age: 82
End: 2022-05-10
Payer: MEDICARE

## 2022-05-10 LAB
FINAL PATHOLOGIC DIAGNOSIS: NORMAL
Lab: NORMAL

## 2022-05-11 PROCEDURE — G0180 PR HOME HEALTH MD CERTIFICATION: ICD-10-PCS | Mod: ,,, | Performed by: INTERNAL MEDICINE

## 2022-05-11 PROCEDURE — G0180 MD CERTIFICATION HHA PATIENT: HCPCS | Mod: ,,, | Performed by: INTERNAL MEDICINE

## 2022-05-11 NOTE — DISCHARGE SUMMARY
[facility] Medicine  Discharge Summary      Patient Name: Gayathri Mack  MRN: [id number]  Patient Class: OP- Observation  Admission Date: 5/8/2022  Hospital Length of Stay: 0 days  Discharge Date and Time: 5/9/2022  8:01 PM  Attending Physician: No att. providers found   Discharging Provider: Luis Alfredo Moy MD  Primary Care Provider: Carlos Deal MD      HPI:   Gayathri Mack is a 81 y.o. female with PMHx of  HTN and COPD who presents to the ED with insidious onset of Nausea vomiting since yesterday afternoon accompanied with general weakness and poor appetite. Patient's daughter reports patient had a throat biopsy procedure 2 days ago. She mentions patient had been reporting throat pain since the procedure. The daughter states patient had similar symptoms a few months ago when patient had a stomach virus. Patient has been confused and not herself since yesterday.  The patient is confused, poor historian. Daughter  denies any fever, CP, shortness of breath, or any other symptoms at this time. No witnessed seizure.  No pertinent PSHx. Patient is a former smoker.      The history is provided by the patient.      * No surgery found *      Hospital Course:   See individual problem list.       Goals of Care Treatment Preferences:  Code Status: Full Code      Consults:   Consults (From admission, onward)        Status Ordering Provider     Inpatient consult to Neurology  Once        Provider:  Gopal Gunderson MD    Completed SUSAN MOSLEY          * Acute encephalopathy   Infection vs. TIA/stroke vs. Metabolic/Toxic. Dementia an unlikely cause given rapidity of onset.       Lab Results   Component Value Date    WBC 11.58 05/09/2022   left shift,  HR 94,, No data recorded.    UA wnl    No focal findings on physical exam  CT shows Some image degradation with large amount of streaky metal artifact in this patient with cochlear implant.  As visualized no acute intracranial  "findings.   Neuro consulted  CTA head and neck No significant stenosis.  No major vascular occlusion  EEG with no acute findings. No seizures  Mental status back to baseline  Cleared for discharge home      Uncontrolled hypertension  Will restart home med  Will continue Prn Labetolol for blood pressure >180/110        Vitamin D deficiency  Hx noted      Essential hypertension  Hx noted      Osteoporosis  Hx noted      Chronic obstructive pulmonary disease  Patient's COPD is controlled currently.  Patient is currently off COPD Pathway. Continue scheduled inhalers Supplemental oxygen and monitor respiratory status closely.           Final Active Diagnoses:    Diagnosis Date Noted POA    PRINCIPAL PROBLEM:  Acute encephalopathy [G93.40] 05/08/2022 Yes    Uncontrolled hypertension [I10] 05/08/2022 Yes    Chronic obstructive pulmonary disease [J44.9] 06/14/2021 Yes    Osteoporosis [M81.0] 06/14/2021 Yes    Essential hypertension [I10] 06/14/2021 Yes    Vitamin D deficiency [E55.9] 06/14/2021 Yes      Problems Resolved During this Admission:       Discharged Condition: good    Disposition: Home-Health Care [facility]    Follow Up:   Follow-up Information     Carlos Deal MD Follow up.    Specialty: Internal Medicine  Contact information:  [address]  [facility] [address]  [phone]             [facility] Follow up.    Specialty: DME Provider  Contact information:  [address] [facility] [id number] [phone]                       Patient Instructions:      WALKER FOR HOME USE     Order Specific Question Answer Comments   Type of Walker: Adult (5'4"-6'6")    With wheels? Yes    Height: 5' 1" (1.549 m)    Weight: 72.1 kg (159 lb)    Length of need (1-99 months): 99    Does patient have medical equipment at home? none    Please check all that apply: Patient's condition impairs ambulation.    Please check all that apply: Patient needs help to get in and out of chair.    Please " check all that apply: Walker will be used for gait training.    Please check all that apply: Patient is unable to safely ambulate without equipment.      Ambulatory referral/consult to ENT   Standing Status: Future   Referral Priority: Routine Referral Type: Consultation   Referral Reason: Specialty Services Required   Requested Specialty: Otolaryngology   Number of Visits Requested: 1     Ambulatory referral/consult to Audiology   Standing Status: Future   Referral Priority: Routine Referral Type: Audiology Exam   Referral Reason: Specialty Services Required   Requested Specialty: Audiology   Number of Visits Requested: 1     Diet Cardiac     Notify your health care provider if you experience any of the following:  temperature >100.4     Notify your health care provider if you experience any of the following:  persistent nausea and vomiting or diarrhea     Notify your health care provider if you experience any of the following:  difficulty breathing or increased cough     Notify your health care provider if you experience any of the following:  persistent dizziness, light-headedness, or visual disturbances     Notify your health care provider if you experience any of the following:  increased confusion or weakness     Activity as tolerated       Significant Diagnostic Studies: Labs: All labs within the past 24 hours have been reviewed    Pending Diagnostic Studies:     Procedure Component Value Units Date/Time    Vitamin B1 [[id number]] Collected: 05/09/22 0525    Order Status: Sent Lab Status: In process Updated: 05/09/22 0539    Specimen: Blood          Medications:  Reconciled Home Medications:      Medication List      START taking these medications    aspirin 81 MG EC tablet  Commonly known as: ECOTRIN  Take 1 tablet (81 mg total) by mouth once daily.        CONTINUE taking these medications    alendronate 70 MG tablet  Commonly known as: FOSAMAX  Take 1 tablet (70 mg total) by mouth every 7 days.      azelastine 137 mcg (0.1 %) nasal spray  Commonly known as: ASTELIN  1 spray (137 mcg total) by Nasal route 2 (two) times daily.     ezetimibe 10 mg tablet  Commonly known as: ZETIA  Take 1 tablet (10 mg total) by mouth once daily.     lisinopriL 20 MG tablet  Commonly known as: PRINIVIL,ZESTRIL  Take 1 tablet (20 mg total) by mouth once daily.     melatonin 300 mcg Tab  Take 300 mcg by mouth nightly as needed (insomnia).     TRELEGY ELLIPTA 200-62.5-25 mcg inhaler  Generic drug: fluticasone-umeclidin-vilanter  Inhale 1 puff into the lungs once daily.     VENTOLIN HFA 90 mcg/actuation inhaler  Generic drug: albuterol  INHALE 2 PUFFS INTO THE LUNGS EVERY 6 HOURS AS NEEDED FOR WHEEZING            Indwelling Lines/Drains at time of discharge:   Lines/Drains/Airways     None                 Time spent on the discharge of patient: 35 minutes         Luis Alfredo Moy MD  Department of Hospital Medicine  [facility]

## 2022-05-11 NOTE — ASSESSMENT & PLAN NOTE
Infection vs. TIA/stroke vs. Metabolic/Toxic. Dementia an unlikely cause given rapidity of onset.       Lab Results   Component Value Date    WBC 11.58 05/09/2022   left shift,  HR 94,, No data recorded.    UA wnl    No focal findings on physical exam  CT shows Some image degradation with large amount of streaky metal artifact in this patient with cochlear implant.  As visualized no acute intracranial findings.   Neuro consulted  CTA head and neck No significant stenosis.  No major vascular occlusion  EEG with no acute findings. No seizures  Mental status back to baseline  Cleared for discharge home

## 2022-05-13 ENCOUNTER — OFFICE VISIT (OUTPATIENT)
Dept: PULMONOLOGY | Facility: CLINIC | Age: 82
End: 2022-05-13
Payer: MEDICARE

## 2022-05-13 ENCOUNTER — TELEPHONE (OUTPATIENT)
Dept: FAMILY MEDICINE | Facility: CLINIC | Age: 82
End: 2022-05-13
Payer: MEDICARE

## 2022-05-13 VITALS
SYSTOLIC BLOOD PRESSURE: 144 MMHG | BODY MASS INDEX: 29.55 KG/M2 | WEIGHT: 156.5 LBS | HEIGHT: 61 IN | OXYGEN SATURATION: 97 % | HEART RATE: 81 BPM | DIASTOLIC BLOOD PRESSURE: 91 MMHG

## 2022-05-13 DIAGNOSIS — H91.90 HEARING LOSS, UNSPECIFIED HEARING LOSS TYPE, UNSPECIFIED LATERALITY: ICD-10-CM

## 2022-05-13 DIAGNOSIS — R05.9 COUGH: ICD-10-CM

## 2022-05-13 DIAGNOSIS — Z87.09 HISTORY OF COPD: ICD-10-CM

## 2022-05-13 DIAGNOSIS — R06.2 WHEEZING: ICD-10-CM

## 2022-05-13 DIAGNOSIS — J06.9 ACUTE URI: ICD-10-CM

## 2022-05-13 DIAGNOSIS — Z96.21 HISTORY OF COCHLEAR IMPLANT: ICD-10-CM

## 2022-05-13 DIAGNOSIS — I77.810 ASCENDING AORTA DILATATION: Primary | ICD-10-CM

## 2022-05-13 LAB — VIT B1 BLD-MCNC: 95 UG/L (ref 38–122)

## 2022-05-13 PROCEDURE — 99215 OFFICE O/P EST HI 40 MIN: CPT | Mod: PBBFAC,PO | Performed by: NURSE PRACTITIONER

## 2022-05-13 PROCEDURE — 99214 PR OFFICE/OUTPT VISIT, EST, LEVL IV, 30-39 MIN: ICD-10-PCS | Mod: S$PBB,,, | Performed by: NURSE PRACTITIONER

## 2022-05-13 PROCEDURE — 99214 OFFICE O/P EST MOD 30 MIN: CPT | Mod: S$PBB,,, | Performed by: NURSE PRACTITIONER

## 2022-05-13 PROCEDURE — 99999 PR PBB SHADOW E&M-EST. PATIENT-LVL V: CPT | Mod: PBBFAC,,, | Performed by: NURSE PRACTITIONER

## 2022-05-13 PROCEDURE — 99999 PR PBB SHADOW E&M-EST. PATIENT-LVL V: ICD-10-PCS | Mod: PBBFAC,,, | Performed by: NURSE PRACTITIONER

## 2022-05-13 RX ORDER — ALBUTEROL SULFATE 90 UG/1
2 AEROSOL, METERED RESPIRATORY (INHALATION) EVERY 6 HOURS PRN
Qty: 18 G | Refills: 3 | Status: SHIPPED | OUTPATIENT
Start: 2022-05-13 | End: 2022-06-03 | Stop reason: SDUPTHER

## 2022-05-13 RX ORDER — MONTELUKAST SODIUM 10 MG/1
10 TABLET ORAL DAILY
COMMUNITY
Start: 2022-03-22 | End: 2022-12-14 | Stop reason: SDUPTHER

## 2022-05-13 NOTE — PROGRESS NOTES
5/16/2022    Gayathri Mack  Office Note    Chief Complaint   Patient presents with    Follow-up    Medication Refill     Rescue inhaler    [facility] f/u       HPI:   5/13/2022- in office with daughter, recently admitted [facility]  5/8/2022 for Nausea dx Encephalopathy. Reviewed hospital discharge summary.   SOB- worsening with time. Worse with exertion but occurs at rest. Using albuterol 3-6 times daily until prescription ran out. Needing refill, associated with occasional cough.   Has supplemental oxygen but not wearing.   Concerned over findings from CTA head and neck.     10/13/2021- in office with daughter. cough- resolved.   not interested in talking about sleep apnea  SOB- stable, worse with exertion , improves with rest, able to walk down driveway with out stopping to rest.   Using albuterol rescue inhaler 2-3 x weekly with benefit.     5/5/21- currently on Trelegy daily, doing well, difficulty walking long distances and exertion, improves with rest. Not using albuterol rescue inhaler often, on average 5x weekly before bed.   Cough- only at night, feels a tickle in throat, non productive; no wheeze or chest tightness.   Takes dailly nap,     1/12/21- states currentlty on Incruse, states Trelegy inhaler worked better. Cough improved while on Trelegy has worsened since stopping, worse when talking long time.  SOB- unchanged, felt she had more energy while on Trelegy. Wheeze has resolved. Worse with exertion, associated with chest tightness.   Has daytime fatigue with frequent awakenings during the night.   Had to cancel lung function test for scheduling issues. Will reschedule.   No smoking.    12/8/2020- onset 3 months, seen at [facility] for bronchitis, tx with albuterol rescue inhaler, oral steroids, nebulizer budesonide, and antibiotics.  First occurences in lifetime  In clinic with daughter, complaint of SOB- onset 1 year, worse with exertion, improves with rest, worse with cold weather,  associated with sinus complaints, associated with chest tightness and wheeze.   Cough- onset 3 months, associated with post nasal drip, worse in early morning and late evening, improves with sipping water and cough drops, productive in mornings clear nickel size, having coughing fits  Fatigue- sleeps when she does not want to. Takes daily naps. States having morning headaches, no mental cloudiness.   Social Hx: lives with daughter and pet dog, Retired , possible mold exposure from work site, no known Asbestosis exposure, Smoking Hx: quit 1 month, 60 pack years.   Family Hx: Mother Lung Cancer, Sister COPD, no Asthma, no MADELAINE  Medical Hx: no previous pneumonia ; no previous shoulder/chest surgery        The chief compliant  problem is stable  PFSH:  Past Medical History:   Diagnosis Date    Alopecia     wear wigs     COPD (chronic obstructive pulmonary disease)     Hearing loss     bilateral     Hx of rickets     Hypertension     Osteoporosis     Skin cancer, basal cell          Past Surgical History:   Procedure Laterality Date    CATARACT EXTRACTION      bliteral    IMPLANTATION OF COCHLEAR PROSTHESIS Left      Social History     Tobacco Use    Smoking status: Former Smoker     Packs/day: 0.50     Types: Cigarettes     Quit date:      Years since quittin.3    Smokeless tobacco: Never Used    Tobacco comment: 8 months ago   Substance Use Topics    Alcohol use: No     No family history on file.  Review of patient's allergies indicates:  No Known Allergies  I have reviewed past medical, family, and social history. I have reviewed previous nurse notes.    Performance Status:The patient's activity level is functions out of house.        Review of Systems   Constitutional: Negative for activity change, appetite change, chills, diaphoresis, fatigue, fever and unexpected weight change.   HENT: Negative for dental problem, postnasal drip, rhinorrhea, sinus pressure, sinus pain,  "sneezing, sore throat, trouble swallowing.  Positive for hoarse voice  Respiratory: Negative for apnea, wheezing, chest tightness, cough, snoring, and stridor.  Positive for shortness of breath,    Cardiovascular: Negative for chest pain, palpitations and leg swelling.   Gastrointestinal: Negative for abdominal distention, abdominal pain, constipation and nausea.   Musculoskeletal: Negative for gait problem, myalgias.  Skin: Negative for color change and pallor.   Allergic/Immunologic: Negative for environmental allergies and food allergies.   Neurological: Negative for dizziness, speech difficulty, weakness, light-headedness, numbness. Positive for headaches  Hematological: Negative for adenopathy. Does not bruise/bleed easily.   Psychiatric/Behavioral: Negative for dysphoric mood and sleep disturbance. The patient is not nervous/anxious.           Exam:Comprehensive exam done. BP (!) 144/91 (BP Location: Left arm, Patient Position: Sitting, BP Method: Medium (Automatic))   Pulse 81   Ht 5' 1" (1.549 m)   Wt 71 kg (156 lb 8.4 oz)   SpO2 97% Comment: on room air at rest  BMI 29.58 kg/m²  Exam included Vitals as listed, and patient's appearance and affect and alertness and mood, oral exam for yeast and hygiene and pharynx lesions and Mallapatti (M) score, neck with inspection for jvd and masses and thyroid abnormalities and lymph nodes (supraclavicular and infraclavicular nodes and axillary also examined and noted if abn), chest exam included symmetry and effort and fremitus and percussion and auscultation, cardiac exam included rhythm and gallops and murmur and rubs and jvd and edema, abdominal exam for mass and hepatosplenomegaly and tenderness and hernias and bowel sounds, Musculoskeletal exam with muscle tone and posture and mobility/gait and  strength, and skin for rashes and cyanosis and pallor and turgor, extremity for clubbing.  Findings were normal except for pertinent findings listed below: M2, BS " lower lungs clear, upper airways bilateral rhonchi. No clubbing no edema          Radiographs (ct chest and cxr) reviewed: results reviewed   CTA Head and Neck   05/08/2022 abnormal cochlear implant, visualized lungs clear, emphysema.     CT Chest Without Contrast 02/02/2022   Unchanged 6 mm pulmonary nodule.  Additional follow-up in 1 year is recommended   Moderate emphysema.     CT Chest Without Contrast 07/28/2021   Early emphysema.  6 mm nodule identified at the right lung apex.  Per Fleischner Society guidelines follow-up CT at 6-12 months  is recommended.  Otherwise negative CT of the chest      XR CHEST PA AND LATERAL  09/20/2020   The lungs are clear, with normal appearance of pulmonary vasculature and no pleural effusion or pneumothorax.     The cardiac silhouette is normal in size. The hilar and mediastinal contours are unremarkable.        Labs none available    Lab Results   Component Value Date    WBC 11.58 05/09/2022    RBC 5.08 05/09/2022    HGB 14.4 05/09/2022    HCT 43.9 05/09/2022    MCV 86 05/09/2022    MCH 28.3 05/09/2022    MCHC 32.8 05/09/2022    RDW 13.6 05/09/2022     05/09/2022    MPV 10.4 05/09/2022    GRAN 8.2 (H) 05/09/2022    GRAN 71.0 05/09/2022    LYMPH 2.4 05/09/2022    LYMPH 20.6 05/09/2022    MONO 0.9 05/09/2022    MONO 7.3 05/09/2022    EOS 0.0 05/09/2022    BASO 0.05 05/09/2022    EOSINOPHIL 0.3 05/09/2022    BASOPHIL 0.4 05/09/2022     Results for DRAGAN SHUKLA (MRN [id number]) as of 5/5/2021 15:00   Ref. Range 1/13/2021 11:21   CO2 Latest Ref Range: 23 - 29 mmol/L 29     PFT  reviewed  Pulmonary Functions Testing Results:  Spirometry bronchodilator, lung volume by gas dilution, diffusion capacity measured January 13, 2021. The FEV1 FVC ratio was 68% indicating airflow obstruction. The FEV1 was 78% predicted at 1.4 L making airflow obstruction mild. There was no improvement    following bronchodilator of significance. Total lung capacity was 93% of predicted. Diffusion,  uncorrected for anemia, was little reduced to 59% of predicted.   There is mild airflow obstruction with no significant bronchodilator response. There is no evidence for restriction. Diffusion is diminished. Clinical correlation recommended     Ambulation in office 5/5/2021 desaturation 93% no lower    I spent over 40 mins of time with the patient and daughter. Reviewed results of the recently ordered labs, tests and CTA Head studies in detail; made directives with regards to the results. Over half of this time was spent couseling and coordinating care.     I have explained all of the above in detail and the patient understands all of the current recommendation(s). I have answered all of their questions to the best of my ability and to their complete satisfaction.   The patient is to continue with the current management plan.      Plan:  Clinical impression is apparently straight forward and impression with management as below.    Gayathri was seen today for follow-up, medication refill and hospital f/u.    Diagnoses and all orders for this visit:    Ascending aorta dilatation  -     Ambulatory referral/consult to Cardiology; Future    History of cochlear implant  -     Ambulatory referral/consult to ENT; Future  -     Ambulatory referral/consult to Audiology; Future    Hearing loss, unspecified hearing loss type, unspecified laterality  -     Ambulatory referral/consult to ENT; Future  -     Ambulatory referral/consult to Audiology; Future    Cough  -     albuterol (VENTOLIN HFA) 90 mcg/actuation inhaler; Inhale 2 puffs into the lungs every 6 (six) hours as needed for Wheezing. Rescue    Wheezing  -     albuterol (VENTOLIN HFA) 90 mcg/actuation inhaler; Inhale 2 puffs into the lungs every 6 (six) hours as needed for Wheezing. Rescue    Acute URI  -     albuterol (VENTOLIN HFA) 90 mcg/actuation inhaler; Inhale 2 puffs into the lungs every 6 (six) hours as needed for Wheezing. Rescue    History of COPD  -     albuterol  (VENTOLIN HFA) 90 mcg/actuation inhaler; Inhale 2 puffs into the lungs every 6 (six) hours as needed for Wheezing. Rescue        Follow up in about 3 months (around 8/13/2022), or if symptoms worsen or fail to improve.    Discussed with patient above for education the following:      Patient Instructions   Wear oxygen at night

## 2022-05-16 ENCOUNTER — DOCUMENTATION ONLY (OUTPATIENT)
Dept: PULMONOLOGY | Facility: CLINIC | Age: 82
End: 2022-05-16
Payer: MEDICARE

## 2022-05-16 NOTE — PHARMACY MED REC
PA request rec for Ventolin, request sent thru [facility],  Medication does not require a PA:  This medication or product is on your plan's list of covered drugs. Prior authorization is not required at this time. If your pharmacy has questions regarding the processing of your prescription, please have them call the [facility] pharmacy help desk at [phone]. **Please note: This request was submitted electronically. Formulary lowering, tiering exception, cost reduction and/or pre-benefit determination review (including prospective Medicare hospice reviews) requests cannot be requested using this method of submission. Providers contact us at [phone] for further assistance

## 2022-05-26 ENCOUNTER — OFFICE VISIT (OUTPATIENT)
Dept: ENDOCRINOLOGY | Facility: CLINIC | Age: 82
End: 2022-05-26
Payer: MEDICARE

## 2022-05-26 VITALS
SYSTOLIC BLOOD PRESSURE: 120 MMHG | HEIGHT: 61 IN | WEIGHT: 155.44 LBS | DIASTOLIC BLOOD PRESSURE: 82 MMHG | TEMPERATURE: 98 F | BODY MASS INDEX: 29.35 KG/M2 | OXYGEN SATURATION: 96 % | HEART RATE: 78 BPM

## 2022-05-26 DIAGNOSIS — E21.3 HYPERPARATHYROIDISM: ICD-10-CM

## 2022-05-26 DIAGNOSIS — E04.2 MULTIPLE THYROID NODULES: Primary | ICD-10-CM

## 2022-05-26 DIAGNOSIS — E55.9 HYPOVITAMINOSIS D: ICD-10-CM

## 2022-05-26 DIAGNOSIS — M81.0 OSTEOPOROSIS, UNSPECIFIED OSTEOPOROSIS TYPE, UNSPECIFIED PATHOLOGICAL FRACTURE PRESENCE: ICD-10-CM

## 2022-05-26 PROCEDURE — 99213 OFFICE O/P EST LOW 20 MIN: CPT | Mod: S$PBB,,, | Performed by: PHYSICIAN ASSISTANT

## 2022-05-26 PROCEDURE — 99999 PR PBB SHADOW E&M-EST. PATIENT-LVL III: CPT | Mod: PBBFAC,,, | Performed by: PHYSICIAN ASSISTANT

## 2022-05-26 PROCEDURE — 99213 PR OFFICE/OUTPT VISIT, EST, LEVL III, 20-29 MIN: ICD-10-PCS | Mod: S$PBB,,, | Performed by: PHYSICIAN ASSISTANT

## 2022-05-26 PROCEDURE — 99999 PR PBB SHADOW E&M-EST. PATIENT-LVL III: ICD-10-PCS | Mod: PBBFAC,,, | Performed by: PHYSICIAN ASSISTANT

## 2022-05-26 PROCEDURE — 99213 OFFICE O/P EST LOW 20 MIN: CPT | Mod: PBBFAC,PO | Performed by: PHYSICIAN ASSISTANT

## 2022-05-26 NOTE — PROGRESS NOTES
"CC: Thyroid Nodule    HPI: Gayathri Mack is a 81 y.o. female here for thyroid nodules along with pending conditions listed in the Visit Diagnosis. New to endocrine. Diagnosed in in 6/21 on a chest CT performed by her CT. +FHx of thyroid nodules in her daughter and mother.    Thyroid u/s showed a 1.9 and 1.5 cm nodule in the left lobe. FNA performed of 1.9 cm nodule was benign 5/22. Pt here w/ daughter to discuss lab results. PTH slightly elevated. No n/v, abdominal pain or kidney stones.    Dexa 2/22 shows osteoporosis. Taking fosamax weekly since 11/18. Her sister has osteoporosis. Taking ca and vd. No exercise. No falls, fractures or steriod injections.    PMHx, PSHx: reviewed in epic. COPD.    Social Hx: no ETOH/tobacco use. Former smoker who quit two years ago. She smoked 1 ppd since she was 19.       Wt Readings from Last 6 Encounters:   05/26/22 70.5 kg (155 lb 6.8 oz)   05/13/22 71 kg (156 lb 8.4 oz)   05/08/22 72.1 kg (159 lb)   04/22/22 72 kg (158 lb 11.7 oz)   02/09/22 70.9 kg (156 lb 4.9 oz)   10/13/21 72.3 kg (159 lb 4.5 oz)      ROS:   Constitutional: No recent significant weight change  Eyes: No recent visual changes  Cardiovascular: Denies current anginal symptoms  Respiratory: Denies current respiratory difficulty  Gastrointestinal: Denies recent bowel disturbances  GenitoUrinary - No dysuria  Skin: No new skin rash  Neurologic: No focal neurologic complaints  Musculoskeletal: no joint pain  Endocrine: no polyphagia, polydipsia or polyuria  Remainder ROS negative     /82 (BP Location: Left arm, Patient Position: Sitting, BP Method: Large (Manual))   Pulse 78   Temp 98 °F (36.7 °C) (Oral)   Ht 5' 1" (1.549 m)   Wt 70.5 kg (155 lb 6.8 oz)   SpO2 96%   BMI 29.37 kg/m²      Personally reviewed labs below:    Lab Results   Component Value Date    TSH 1.064 02/09/2022    FREET4 0.96 02/09/2022        Chemistry        Component Value Date/Time     05/09/2022 0525    K 4.2 05/09/2022 0525 "     05/09/2022 0525    CO2 24 05/09/2022 0525    BUN 14 05/09/2022 0525    CREATININE 0.8 05/09/2022 0525    GLU 94 05/09/2022 0525        Component Value Date/Time    CALCIUM 8.5 (L) 05/09/2022 0525    ALKPHOS 67 05/08/2022 1046    AST 21 05/08/2022 1046    ALT 33 05/08/2022 1046    BILITOT 0.4 05/08/2022 1046    ESTGFRAFRICA >60 05/09/2022 0525    EGFRNONAA >60 05/09/2022 0525         No results found for: HGBA1C     PE:  GENERAL: elderly female, well developed, well nourished  NECK: Supple neck, normal thyroid. No bruit  LYMPHATIC: No cervical or supraclavicular lymphadenopathy  CARDIOVASCULAR: Normal heart sounds, no pedal edema  RESPIRATORY: Normal effort, clear to auscultation  MUSC: 2+ DTR UE/LE  NEURO: steady gait, CN ll-Xll grossly intact  PSYCH: normal mood and affect    Assessment/Plan:   1. Multiple thyroid nodules     2. Osteoporosis, unspecified osteoporosis type, unspecified pathological fracture presence     3. Hypovitaminosis D        Nodular thyroid disease-stable-FNA benign. Repeat u/s in 2/23 .THe 1.5 cm nodule was not identified during the FNA.  Postmenopausal-DEXA scan 2/24. Continue Fosamax. Start taking 2000 IU of vitamin D and 1500 mg of calcium. Also, participate in weight bearing and resistance exercises for 40 min 4x weekly to maintain your bone density. Will check for secondary causes.  Hypovitaminosis n-cbursh-hzxiiwyf vd intake.  Hyperparathyroidism-recheck pth, iCa, Ca is normal.    F/u in 6 mths-tsh, t4

## 2022-05-30 ENCOUNTER — EXTERNAL HOME HEALTH (OUTPATIENT)
Dept: HOME HEALTH SERVICES | Facility: HOSPITAL | Age: 82
End: 2022-05-30
Payer: MEDICARE

## 2022-06-03 DIAGNOSIS — R06.2 WHEEZING: ICD-10-CM

## 2022-06-03 DIAGNOSIS — Z87.09 HISTORY OF COPD: ICD-10-CM

## 2022-06-03 DIAGNOSIS — J06.9 ACUTE URI: ICD-10-CM

## 2022-06-03 DIAGNOSIS — R05.9 COUGH: ICD-10-CM

## 2022-06-03 RX ORDER — ALBUTEROL SULFATE 90 UG/1
2 AEROSOL, METERED RESPIRATORY (INHALATION) EVERY 6 HOURS PRN
Qty: 18 G | Refills: 3 | Status: SHIPPED | OUTPATIENT
Start: 2022-06-03 | End: 2022-10-07 | Stop reason: SDUPTHER

## 2022-06-03 NOTE — TELEPHONE ENCOUNTER
----- Message from Stephanie Garrett sent at 6/3/2022 12:21 PM CDT -----  Regarding: refills  Contact: Beatrice luna  Type:  RX Refill Request    Who Called:  daughterBeatrice  Refill or New Rx:  new  RX Name and Strength:   albuterol .5/3 mg inhalation solution  albuterol (VENTOLIN HFA) 90 mcg/actuation inhaler  How is the patient currently taking it? (ex. ): every 6 hours as needed  Is this a 30 day or 90 day RX:  90  Preferred Pharmacy with phone number:   [facility]  [address], [place]   Phone: [phone]  Local or Mail Order:  local  Ordering Provider:  Yolanda Albarran  Best Call Back Number:  [phone] (home)   Additional Information:  Daughter states patient has an  prescribe and needs a new one for the solution and wants the inhaler to be sent to her local pharmacy. Daughter states it needs to go to her local pharmacy [facility]. Please call patient to advise. Thanks!

## 2022-06-14 ENCOUNTER — DOCUMENT SCAN (OUTPATIENT)
Dept: HOME HEALTH SERVICES | Facility: HOSPITAL | Age: 82
End: 2022-06-14
Payer: MEDICARE

## 2022-06-22 ENCOUNTER — OFFICE VISIT (OUTPATIENT)
Dept: OTOLARYNGOLOGY | Facility: CLINIC | Age: 82
End: 2022-06-22
Payer: MEDICARE

## 2022-06-22 ENCOUNTER — CLINICAL SUPPORT (OUTPATIENT)
Dept: AUDIOLOGY | Facility: CLINIC | Age: 82
End: 2022-06-22
Payer: MEDICARE

## 2022-06-22 VITALS — BODY MASS INDEX: 29.51 KG/M2 | WEIGHT: 156.31 LBS | HEIGHT: 61 IN

## 2022-06-22 DIAGNOSIS — H90.3 SENSORINEURAL HEARING LOSS (SNHL) OF BOTH EARS: Primary | ICD-10-CM

## 2022-06-22 DIAGNOSIS — H93.293 IMPAIRED AUDITORY DISCRIMINATION, BILATERAL: ICD-10-CM

## 2022-06-22 DIAGNOSIS — H90.3 BILATERAL SENSORINEURAL HEARING LOSS: ICD-10-CM

## 2022-06-22 DIAGNOSIS — Z96.21 COCHLEAR IMPLANT IN PLACE: Primary | ICD-10-CM

## 2022-06-22 PROCEDURE — 99203 OFFICE O/P NEW LOW 30 MIN: CPT | Mod: 25,S$PBB,, | Performed by: NURSE PRACTITIONER

## 2022-06-22 PROCEDURE — G0268 REMOVAL OF IMPACTED WAX MD: HCPCS | Mod: S$PBB,,, | Performed by: NURSE PRACTITIONER

## 2022-06-22 PROCEDURE — 92567 TYMPANOMETRY: CPT | Mod: PBBFAC,PO | Performed by: AUDIOLOGIST

## 2022-06-22 PROCEDURE — G0268 PR REMOVAL OF IMPACTED WAX MD: ICD-10-PCS | Mod: S$PBB,,, | Performed by: NURSE PRACTITIONER

## 2022-06-22 PROCEDURE — 99999 PR PBB SHADOW E&M-EST. PATIENT-LVL III: ICD-10-PCS | Mod: PBBFAC,,, | Performed by: NURSE PRACTITIONER

## 2022-06-22 PROCEDURE — 99999 PR PBB SHADOW E&M-EST. PATIENT-LVL III: CPT | Mod: PBBFAC,,, | Performed by: NURSE PRACTITIONER

## 2022-06-22 PROCEDURE — 99213 OFFICE O/P EST LOW 20 MIN: CPT | Mod: PBBFAC,PO | Performed by: NURSE PRACTITIONER

## 2022-06-22 PROCEDURE — 92557 COMPREHENSIVE HEARING TEST: CPT | Mod: PBBFAC,PO | Performed by: AUDIOLOGIST

## 2022-06-22 PROCEDURE — 99203 PR OFFICE/OUTPT VISIT, NEW, LEVL III, 30-44 MIN: ICD-10-PCS | Mod: 25,S$PBB,, | Performed by: NURSE PRACTITIONER

## 2022-06-22 PROCEDURE — G0268 REMOVAL OF IMPACTED WAX MD: HCPCS | Mod: PBBFAC,PO | Performed by: NURSE PRACTITIONER

## 2022-06-22 NOTE — PROGRESS NOTES
Subjective:       Patient ID: Gayathri Mack is a 82 y.o. female.    Chief Complaint: Hearing Loss    HPI   Patient has a cochlear implant AS placed several years ago in [place]. She states equipment gets replaced/upgraded every 7 years ago. She is currently on her 2nd set but it is time for it to be replaced again. She would like that done here now that she lives in [place]. Patient was told that she would eventually need a cochlear implant done AD as well. Patient suspects her hearing has gradually worsened.     Review of Systems   Constitutional: Negative.    HENT: Positive for hearing loss.    Eyes: Negative.    Respiratory: Negative.    Cardiovascular: Negative.    Gastrointestinal: Negative.    Musculoskeletal: Negative.    Integumentary:  Negative.   Neurological: Negative.    Hematological: Negative.    Psychiatric/Behavioral: Negative.          Objective:      Physical Exam  Vitals and nursing note reviewed.   Constitutional:       General: She is not in acute distress.     Appearance: She is well-developed. She is not ill-appearing or diaphoretic.   HENT:      Head: Normocephalic and atraumatic.      Right Ear: Hearing, tympanic membrane, ear canal and external ear normal. No middle ear effusion. Tympanic membrane is not erythematous.      Left Ear: Hearing, tympanic membrane, ear canal and external ear normal.  No middle ear effusion. Tympanic membrane is not erythematous.      Nose: Nose normal.      Mouth/Throat:      Pharynx: Uvula midline.   Eyes:      General: Lids are normal. No scleral icterus.        Right eye: No discharge.         Left eye: No discharge.   Neck:      Trachea: Trachea normal. No tracheal deviation.   Cardiovascular:      Rate and Rhythm: Normal rate.   Pulmonary:      Effort: Pulmonary effort is normal. No respiratory distress.      Breath sounds: No stridor. No wheezing.   Musculoskeletal:         General: Normal range of motion.      Cervical back: Normal range of motion and  neck supple.   Skin:     General: Skin is warm and dry.      Coloration: Skin is not pale.   Neurological:      Mental Status: She is alert and oriented to person, place, and time.      Coordination: Coordination normal.      Gait: Gait normal.   Psychiatric:         Speech: Speech normal.         Behavior: Behavior normal. Behavior is cooperative.         Thought Content: Thought content normal.         Judgment: Judgment normal.       SEPARATE PROCEDURE IN OFFICE:   Procedure: Removal of impacted cerumen, bilateral   Pre Procedure Diagnosis: Cerumen Impaction   Post Procedure Diagnosis: Cerumen Impaction   Verbal informed consent in regards to risk of trauma to ear canal, ear drum or hearing, discomfort during procedure and/or inability to remove cerumen impaction in one session or unforeseen events or complications.   No anesthesia.     Procedure in detail:   Ear canal visualized bilateral with appropriate size ear speculum utilizing Operating Head Binocular Otomicroscope   Utilizing the following:  Ring curet, delicate alligator forceps, and/or suction cannula was used. The impacted cerumen of the ear canals was removed atraumatically. The TM and EAC were then inspected and found to be clear of wax. See description of TMs/EACs in PE above.   Complications: No   Condition: Improved/Good     Assessment:       Problem List Items Addressed This Visit    None     Visit Diagnoses     Cochlear implant in place    -  Primary    Relevant Orders    Ambulatory referral/consult to ENT    Bilateral sensorineural hearing loss        Impaired auditory discrimination, bilateral              Plan:     Patient would need to see our [place] otologist to inquire about getting her equipment upgraded as we do not offer those services on the [facility] -- referral placed.     Patient encouraged to return to clinic if symptoms worsen/persist and as needed for further ENT symptoms or concerns.

## 2022-06-29 ENCOUNTER — TELEPHONE (OUTPATIENT)
Dept: OTOLARYNGOLOGY | Facility: CLINIC | Age: 82
End: 2022-06-29
Payer: MEDICARE

## 2022-06-29 NOTE — TELEPHONE ENCOUNTER
Tried to call pt to schedule an appointment. Appointment scheduled, left message for pt to call back if appointment time and date is any inconvenience.

## 2022-07-10 PROCEDURE — G0179 MD RECERTIFICATION HHA PT: HCPCS | Mod: ,,, | Performed by: INTERNAL MEDICINE

## 2022-07-10 PROCEDURE — G0179 PR HOME HEALTH MD RECERTIFICATION: ICD-10-PCS | Mod: ,,, | Performed by: INTERNAL MEDICINE

## 2022-07-14 ENCOUNTER — EXTERNAL HOME HEALTH (OUTPATIENT)
Dept: HOME HEALTH SERVICES | Facility: HOSPITAL | Age: 82
End: 2022-07-14
Payer: MEDICARE

## 2022-09-06 ENCOUNTER — LAB VISIT (OUTPATIENT)
Dept: LAB | Facility: HOSPITAL | Age: 82
End: 2022-09-06
Attending: PHYSICIAN ASSISTANT
Payer: MEDICARE

## 2022-09-06 DIAGNOSIS — E21.3 HYPERPARATHYROIDISM, UNSPECIFIED: Primary | ICD-10-CM

## 2022-09-06 LAB
CA-I BLDV-SCNC: 1.33 MMOL/L (ref 1.06–1.42)
PTH-INTACT SERPL-MCNC: 52.8 PG/ML (ref 9–77)

## 2022-09-06 PROCEDURE — 36415 COLL VENOUS BLD VENIPUNCTURE: CPT | Performed by: PHYSICIAN ASSISTANT

## 2022-09-06 PROCEDURE — 82330 ASSAY OF CALCIUM: CPT | Performed by: PHYSICIAN ASSISTANT

## 2022-09-06 PROCEDURE — 83970 ASSAY OF PARATHORMONE: CPT | Performed by: PHYSICIAN ASSISTANT

## 2022-09-14 ENCOUNTER — EXTERNAL HOME HEALTH (OUTPATIENT)
Dept: HOME HEALTH SERVICES | Facility: HOSPITAL | Age: 82
End: 2022-09-14
Payer: MEDICARE

## 2022-09-20 ENCOUNTER — CLINICAL SUPPORT (OUTPATIENT)
Dept: AUDIOLOGY | Facility: CLINIC | Age: 82
End: 2022-09-20
Payer: MEDICARE

## 2022-09-20 ENCOUNTER — OFFICE VISIT (OUTPATIENT)
Dept: OTOLARYNGOLOGY | Facility: CLINIC | Age: 82
End: 2022-09-20
Payer: MEDICARE

## 2022-09-20 VITALS — BODY MASS INDEX: 29.48 KG/M2 | WEIGHT: 156 LBS

## 2022-09-20 DIAGNOSIS — G89.29 CHRONIC HEAD PAIN: Primary | ICD-10-CM

## 2022-09-20 DIAGNOSIS — R51.9 CHRONIC HEAD PAIN: Primary | ICD-10-CM

## 2022-09-20 DIAGNOSIS — H93.293 IMPAIRED AUDITORY DISCRIMINATION, BILATERAL: ICD-10-CM

## 2022-09-20 DIAGNOSIS — Z96.21 COCHLEAR IMPLANT IN PLACE: ICD-10-CM

## 2022-09-20 DIAGNOSIS — H90.3 SENSORINEURAL HEARING LOSS, BILATERAL: Primary | ICD-10-CM

## 2022-09-20 PROCEDURE — 99999 PR PBB SHADOW E&M-EST. PATIENT-LVL III: ICD-10-PCS | Mod: PBBFAC,,, | Performed by: OTOLARYNGOLOGY

## 2022-09-20 PROCEDURE — 99999 PR PBB SHADOW E&M-EST. PATIENT-LVL III: CPT | Mod: PBBFAC,,, | Performed by: OTOLARYNGOLOGY

## 2022-09-20 PROCEDURE — 99213 OFFICE O/P EST LOW 20 MIN: CPT | Mod: S$PBB,,, | Performed by: OTOLARYNGOLOGY

## 2022-09-20 PROCEDURE — 92604 REPROGRAM COCHLEAR IMPLT 7/>: CPT | Mod: PBBFAC

## 2022-09-20 PROCEDURE — 99213 OFFICE O/P EST LOW 20 MIN: CPT | Mod: PBBFAC | Performed by: OTOLARYNGOLOGY

## 2022-09-20 PROCEDURE — 99213 PR OFFICE/OUTPT VISIT, EST, LEVL III, 20-29 MIN: ICD-10-PCS | Mod: S$PBB,,, | Performed by: OTOLARYNGOLOGY

## 2022-09-20 NOTE — PROGRESS NOTES
Cochlear implant programming session    LEFT EAR: Dr. Carson Mueller ([place], [place])  : [id number]   Internal device/serial number: Sonata implant (SN: [id number])  Processor: HUSEYIN  DOS: 02/25/2008  Magnet strength: 1  Battery type: Disposable    Med-El remote control    Ms. Gayathri Mack was seen today for a cochlear implant programming session. Ms. Mack reported being implanted in [place] and recently relocating to the [place] area. She is interested in obtaining an updated HUSEYIN processor as it has been 7 years since her last processor was fit. Ms. Mack also reported recently suffering from headaches that radiate around her skull and down the left side of her neck. She has received a CT scan to investigate these symptoms. Ms. Mack was accompanied today by her two daughters who reported that Ms. Mack has been having more difficulty communicating lately and are concerned the sound processor is not working appropriately.     Ms. Mack's magnet site was checked today. The magnet site appeared very irritated and compressed today suggesting that her magnet strength was too high. Her magnet was decreased from a size 2 to a size 1. A soft pad was also applied to her processor to aid in comfort. Ms. Mack was encouraged to try this new magnet strength for one month in hope of resolving her head pain.     Her sound processor was connected to the software and her T and C levels were checked for audibility and comfort. She reported good sound quality and comfort from her processor following these changes.    Due to the current trouble with magnet strength, Ms. Mack was encouraged to consider an on the ear processor for her next upgrade. She was given Dolly Townsend's contact information to discuss benefits of both styles of sound processor's prior to making her decision.    Recommend:  Cochlear implant programming session following device upgrade  Annual audiogram

## 2022-09-22 NOTE — PROGRESS NOTES
Subjective:       Patient ID: Gayathri Mack is a 82 y.o. female.    Chief Complaint: Follow-up    Follow-up  Pertinent negatives include no chest pain, chills, fever or sore throat.      Gayathri Mack is a 82 y.o. female hx of AS CI in [place]. Presents for chronic pain at implant site.  Seen by Audiology noted to have a very strong magnet which has been reduced. Pt already notes improvement in pain. Uses device daily and states works well for her.  No other issues.    Review of Systems   Constitutional:  Negative for chills and fever.   HENT:  Positive for ear pain. Negative for sore throat and trouble swallowing.    Respiratory:  Negative for apnea and chest tightness.    Cardiovascular:  Negative for chest pain.       Objective:      Physical Exam  Vitals and nursing note reviewed.   Constitutional:       Appearance: Normal appearance.   HENT:      Head: Normocephalic and atraumatic.        Right Ear: Tympanic membrane, ear canal and external ear normal. There is no impacted cerumen.      Left Ear: Tympanic membrane, ear canal and external ear normal. There is no impacted cerumen.   Neurological:      Mental Status: She is alert.       Assessment:       Problem List Items Addressed This Visit    None  Visit Diagnoses       Chronic head pain    -  Primary    Cochlear implant in place                  Plan:       Magnet size decreased   F/u prn

## 2022-10-05 ENCOUNTER — TELEPHONE (OUTPATIENT)
Dept: ENDOCRINOLOGY | Facility: CLINIC | Age: 82
End: 2022-10-05
Payer: MEDICARE

## 2022-10-05 NOTE — TELEPHONE ENCOUNTER
----- Message from Escobar Price sent at 10/5/2022 12:49 PM CDT -----  Contact: pt at [phone]  Type: Needs Medical Advice  Who Called:  pt    Best Call Back Number: [phone]    Additional Information: pt is calling the office requesting a call back in regards to blood work. She want to know if the orders for blood work would be switched to the Home health nurse with [facility] fax # [phone]. Please call back and advise.

## 2022-10-07 ENCOUNTER — OFFICE VISIT (OUTPATIENT)
Dept: PULMONOLOGY | Facility: CLINIC | Age: 82
End: 2022-10-07
Payer: MEDICARE

## 2022-10-07 VITALS
HEIGHT: 61 IN | HEART RATE: 73 BPM | SYSTOLIC BLOOD PRESSURE: 128 MMHG | OXYGEN SATURATION: 95 % | BODY MASS INDEX: 29.38 KG/M2 | DIASTOLIC BLOOD PRESSURE: 92 MMHG | WEIGHT: 155.63 LBS

## 2022-10-07 DIAGNOSIS — J44.9 CHRONIC OBSTRUCTIVE PULMONARY DISEASE, UNSPECIFIED COPD TYPE: Primary | ICD-10-CM

## 2022-10-07 PROCEDURE — 99213 PR OFFICE/OUTPT VISIT, EST, LEVL III, 20-29 MIN: ICD-10-PCS | Mod: S$PBB,,, | Performed by: NURSE PRACTITIONER

## 2022-10-07 PROCEDURE — 99999 PR PBB SHADOW E&M-EST. PATIENT-LVL III: CPT | Mod: PBBFAC,,, | Performed by: NURSE PRACTITIONER

## 2022-10-07 PROCEDURE — 99213 OFFICE O/P EST LOW 20 MIN: CPT | Mod: PBBFAC,PO | Performed by: NURSE PRACTITIONER

## 2022-10-07 PROCEDURE — 99213 OFFICE O/P EST LOW 20 MIN: CPT | Mod: S$PBB,,, | Performed by: NURSE PRACTITIONER

## 2022-10-07 PROCEDURE — 99999 PR PBB SHADOW E&M-EST. PATIENT-LVL III: ICD-10-PCS | Mod: PBBFAC,,, | Performed by: NURSE PRACTITIONER

## 2022-10-07 RX ORDER — ALBUTEROL SULFATE 90 UG/1
2 AEROSOL, METERED RESPIRATORY (INHALATION) EVERY 4 HOURS PRN
Qty: 18 G | Refills: 11 | Status: SHIPPED | OUTPATIENT
Start: 2022-10-07 | End: 2024-01-12 | Stop reason: SDUPTHER

## 2022-10-07 RX ORDER — ALBUTEROL SULFATE 90 UG/1
2 AEROSOL, METERED RESPIRATORY (INHALATION) EVERY 6 HOURS PRN
Qty: 18 G | Refills: 11 | Status: SHIPPED | OUTPATIENT
Start: 2022-10-07 | End: 2022-10-07 | Stop reason: SDUPTHER

## 2022-10-07 NOTE — PROGRESS NOTES
10/7/2022    Gayathri Mack  Office Note    Chief Complaint   Patient presents with    3m f/u       HPI:   10/17/22- in office with daughter, SOB- stable, no changes in past 6 months, on Trelegy daily with benefit, improves with albuterol inhaler using on average 5 times weekly as needed, SOB worse with exertion, improves with rest and albuterol. Able to clean house as long as she paces herself.     5/13/2022- in office with daughter, recently admitted [facility]  5/8/2022 for Nausea dx Encephalopathy. Reviewed hospital discharge summary.   SOB- worsening with time. Worse with exertion but occurs at rest. Using albuterol 3-6 times daily until prescription ran out. Needing refill, associated with occasional cough.   Has supplemental oxygen but not wearing.   Concerned over findings from CTA head and neck.     10/13/2021- in office with daughter. cough- resolved.   not interested in talking about sleep apnea  SOB- stable, worse with exertion , improves with rest, able to walk down driveway with out stopping to rest.   Using albuterol rescue inhaler 2-3 x weekly with benefit.     5/5/21- currently on Trelegy daily, doing well, difficulty walking long distances and exertion, improves with rest. Not using albuterol rescue inhaler often, on average 5x weekly before bed.   Cough- only at night, feels a tickle in throat, non productive; no wheeze or chest tightness.   Takes dailly nap,     1/12/21- states currentlty on Incruse, states Trelegy inhaler worked better. Cough improved while on Trelegy has worsened since stopping, worse when talking long time.  SOB- unchanged, felt she had more energy while on Trelegy. Wheeze has resolved. Worse with exertion, associated with chest tightness.   Has daytime fatigue with frequent awakenings during the night.   Had to cancel lung function test for scheduling issues. Will reschedule.   No smoking.    12/8/2020- onset 3 months, seen at [facility] for bronchitis, tx with  albuterol rescue inhaler, oral steroids, nebulizer budesonide, and antibiotics.  First occurences in lifetime  In clinic with daughter, complaint of SOB- onset 1 year, worse with exertion, improves with rest, worse with cold weather, associated with sinus complaints, associated with chest tightness and wheeze.   Cough- onset 3 months, associated with post nasal drip, worse in early morning and late evening, improves with sipping water and cough drops, productive in mornings clear nickel size, having coughing fits  Fatigue- sleeps when she does not want to. Takes daily naps. States having morning headaches, no mental cloudiness.   Social Hx: lives with daughter and pet dog, Retired , possible mold exposure from work site, no known Asbestosis exposure, Smoking Hx: quit 1 month, 60 pack years.   Family Hx: Mother Lung Cancer, Sister COPD, no Asthma, no MADELAINE  Medical Hx: no previous pneumonia ; no previous shoulder/chest surgery        The chief compliant  problem is stable  PFSH:  Past Medical History:   Diagnosis Date    Alopecia     wear wigs     COPD (chronic obstructive pulmonary disease)     Hearing loss     bilateral     Hx of rickets     Hypertension     Osteoporosis     Skin cancer, basal cell          Past Surgical History:   Procedure Laterality Date    CATARACT EXTRACTION      bliteral    IMPLANTATION OF COCHLEAR PROSTHESIS Left      Social History     Tobacco Use    Smoking status: Former     Packs/day: 0.50     Types: Cigarettes     Quit date: 2020     Years since quittin.7    Smokeless tobacco: Never    Tobacco comments:     8 months ago   Substance Use Topics    Alcohol use: No     No family history on file.  Review of patient's allergies indicates:  No Known Allergies  I have reviewed past medical, family, and social history. I have reviewed previous nurse notes.    Performance Status:The patient's activity level is functions out of house.      Review of Systems:  a review of  "eleven systems covering constitutional, Eye, HEENT, Psych, Respiratory, Cardiac, GI, , Musculoskeletal, Endocrine, Dermatologic was negative except for pertinent findings as listed ABOVE and below: pertinent positive as above, rest is good  Shortness of breath         Exam:Comprehensive exam done. BP (!) 128/92 (BP Location: Left arm, Patient Position: Sitting, BP Method: Medium (Automatic))   Pulse 73   Ht 5' 1" (1.549 m)   Wt 70.6 kg (155 lb 10.3 oz)   SpO2 95% Comment: on room air at rest  BMI 29.41 kg/m²  Exam included Vitals as listed, and patient's appearance and affect and alertness and mood, oral exam for yeast and hygiene and pharynx lesions and Mallapatti (M) score, neck with inspection for jvd and masses and thyroid abnormalities and lymph nodes (supraclavicular and infraclavicular nodes and axillary also examined and noted if abn), chest exam included symmetry and effort and fremitus and percussion and auscultation, cardiac exam included rhythm and gallops and murmur and rubs and jvd and edema, abdominal exam for mass and hepatosplenomegaly and tenderness and hernias and bowel sounds, Musculoskeletal exam with muscle tone and posture and mobility/gait and  strength, and skin for rashes and cyanosis and pallor and turgor, extremity for clubbing.  Findings were normal except for pertinent findings listed below: M2, BS clear, No clubbing no edema          Radiographs (ct chest and cxr) reviewed: results reviewed   CTA Head and Neck   05/08/2022 abnormal cochlear implant, visualized lungs clear, emphysema.     CT Chest Without Contrast 02/02/2022   Unchanged 6 mm pulmonary nodule.  Additional follow-up in 1 year is recommended   Moderate emphysema.     CT Chest Without Contrast 07/28/2021   Early emphysema.  6 mm nodule identified at the right lung apex.  Per Fleischner Society guidelines follow-up CT at 6-12 months  is recommended.  Otherwise negative CT of the chest      XR CHEST PA AND LATERAL  " 09/20/2020   The lungs are clear, with normal appearance of pulmonary vasculature and no pleural effusion or pneumothorax.     The cardiac silhouette is normal in size. The hilar and mediastinal contours are unremarkable.        Labs none available    Lab Results   Component Value Date    WBC 11.58 05/09/2022    RBC 5.08 05/09/2022    HGB 14.4 05/09/2022    HCT 43.9 05/09/2022    MCV 86 05/09/2022    MCH 28.3 05/09/2022    MCHC 32.8 05/09/2022    RDW 13.6 05/09/2022     05/09/2022    MPV 10.4 05/09/2022    GRAN 8.2 (H) 05/09/2022    GRAN 71.0 05/09/2022    LYMPH 2.4 05/09/2022    LYMPH 20.6 05/09/2022    MONO 0.9 05/09/2022    MONO 7.3 05/09/2022    EOS 0.0 05/09/2022    BASO 0.05 05/09/2022    EOSINOPHIL 0.3 05/09/2022    BASOPHIL 0.4 05/09/2022     Results for GAYATHRI SHUKLA (MRN [id number]) as of 5/5/2021 15:00   Ref. Range 1/13/2021 11:21   CO2 Latest Ref Range: 23 - 29 mmol/L 29     PFT  reviewed  Pulmonary Functions Testing Results:  Spirometry bronchodilator, lung volume by gas dilution, diffusion capacity measured January 13, 2021. The FEV1 FVC ratio was 68% indicating airflow obstruction. The FEV1 was 78% predicted at 1.4 L making airflow obstruction mild. There was no improvement    following bronchodilator of significance. Total lung capacity was 93% of predicted. Diffusion, uncorrected for anemia, was little reduced to 59% of predicted.   There is mild airflow obstruction with no significant bronchodilator response. There is no evidence for restriction. Diffusion is diminished. Clinical correlation recommended     Ambulation in office 5/5/2021 desaturation 93% no lower      Plan:  Clinical impression is apparently straight forward and impression with management as below.    Gayathri was seen today for 3m f/u.    Diagnoses and all orders for this visit:    Chronic obstructive pulmonary disease, unspecified COPD type  -     albuterol (VENTOLIN HFA) 90 mcg/actuation inhaler; Inhale 2 puffs into the lungs  every 4 (four) hours as needed for Wheezing or Shortness of Breath. Rescue        Follow up in about 6 months (around 4/7/2023), or if symptoms worsen or fail to improve.    Discussed with patient above for education the following:      Patient Instructions   Continue current COPD medication regiment    Handicap sticker form written

## 2022-10-12 ENCOUNTER — LAB VISIT (OUTPATIENT)
Dept: LAB | Facility: HOSPITAL | Age: 82
End: 2022-10-12
Attending: PHYSICIAN ASSISTANT
Payer: MEDICARE

## 2022-10-12 DIAGNOSIS — E04.2 NONTOXIC MULTINODULAR GOITER: Primary | ICD-10-CM

## 2022-10-12 DIAGNOSIS — E21.3 HYPERPARATHYROIDISM, UNSPECIFIED: ICD-10-CM

## 2022-10-12 LAB
ALBUMIN SERPL BCP-MCNC: 3.6 G/DL (ref 3.5–5.2)
ANION GAP SERPL CALC-SCNC: 8 MMOL/L (ref 8–16)
BUN SERPL-MCNC: 19 MG/DL (ref 8–23)
CALCIUM SERPL-MCNC: 9.2 MG/DL (ref 8.7–10.5)
CHLORIDE SERPL-SCNC: 108 MMOL/L (ref 95–110)
CO2 SERPL-SCNC: 25 MMOL/L (ref 23–29)
CREAT SERPL-MCNC: 0.8 MG/DL (ref 0.5–1.4)
EST. GFR  (NO RACE VARIABLE): >60 ML/MIN/1.73 M^2
GLUCOSE SERPL-MCNC: 101 MG/DL (ref 70–110)
PHOSPHATE SERPL-MCNC: 3.2 MG/DL (ref 2.7–4.5)
POTASSIUM SERPL-SCNC: 4.6 MMOL/L (ref 3.5–5.1)
PTH-INTACT SERPL-MCNC: 90.2 PG/ML (ref 9–77)
SODIUM SERPL-SCNC: 141 MMOL/L (ref 136–145)
T4 FREE SERPL-MCNC: 0.9 NG/DL (ref 0.71–1.51)
TSH SERPL DL<=0.005 MIU/L-ACNC: 1.07 UIU/ML (ref 0.4–4)

## 2022-10-12 PROCEDURE — 36415 COLL VENOUS BLD VENIPUNCTURE: CPT | Performed by: PHYSICIAN ASSISTANT

## 2022-10-12 PROCEDURE — 80069 RENAL FUNCTION PANEL: CPT | Performed by: PHYSICIAN ASSISTANT

## 2022-10-12 PROCEDURE — 84439 ASSAY OF FREE THYROXINE: CPT | Performed by: PHYSICIAN ASSISTANT

## 2022-10-12 PROCEDURE — 84443 ASSAY THYROID STIM HORMONE: CPT | Performed by: PHYSICIAN ASSISTANT

## 2022-10-12 PROCEDURE — 83970 ASSAY OF PARATHORMONE: CPT | Performed by: PHYSICIAN ASSISTANT

## 2022-10-13 DIAGNOSIS — R05.9 COUGH: ICD-10-CM

## 2022-10-13 DIAGNOSIS — J06.9 ACUTE URI: ICD-10-CM

## 2022-10-13 DIAGNOSIS — J44.9 CHRONIC OBSTRUCTIVE PULMONARY DISEASE, UNSPECIFIED COPD TYPE: ICD-10-CM

## 2022-10-13 DIAGNOSIS — R06.2 WHEEZING: ICD-10-CM

## 2022-10-13 DIAGNOSIS — Z87.09 HISTORY OF COPD: ICD-10-CM

## 2022-10-13 RX ORDER — IPRATROPIUM BROMIDE AND ALBUTEROL SULFATE 2.5; .5 MG/3ML; MG/3ML
3 SOLUTION RESPIRATORY (INHALATION) EVERY 6 HOURS PRN
Qty: 25 EACH | Refills: 2 | Status: SHIPPED | OUTPATIENT
Start: 2022-10-13 | End: 2023-02-02 | Stop reason: SDUPTHER

## 2022-10-13 RX ORDER — ALBUTEROL SULFATE 90 UG/1
2 AEROSOL, METERED RESPIRATORY (INHALATION) EVERY 4 HOURS PRN
Qty: 18 G | Refills: 11 | Status: CANCELLED | OUTPATIENT
Start: 2022-10-13

## 2022-10-13 NOTE — TELEPHONE ENCOUNTER
----- Message from Catherine Freedman sent at 10/13/2022  8:13 AM CDT -----  Contact: Self  Type:  RX Refill Request    Who Called:  Patient  Refill or New Rx:  New Rx  RX Name and Strength:  albuterol (VENTOLIN HFA) 90 mcg/actuation inhaler  How is the patient currently taking it? (ex. 1XDay):  As directed  Is this a 30 day or 90 day RX:  90  Preferred Pharmacy with phone number:    [id number] - [place] - [address] AT [facility]  [address]  [place] [address]  Phone: [phone] Fax: [phone]  Local or Mail Order:  Local  Ordering Provider: Deion Keita Call Back Number:  [phone]  Additional Information:  Pt is needing to cancel the order to [facility] and have her albuterol solution come [facility] her local [facility]. She has been out of this for a while now. Thank You.

## 2022-10-17 ENCOUNTER — TELEPHONE (OUTPATIENT)
Dept: PULMONOLOGY | Facility: CLINIC | Age: 82
End: 2022-10-17
Payer: MEDICARE

## 2022-10-17 NOTE — TELEPHONE ENCOUNTER
Sent message to provider regarding codes.     ----- Message from Amish French sent at 10/17/2022  4:35 PM CDT -----  Contact: self  Type: Pharmacy Calling to Clarify an RX        Name of Caller: Patient   Pharmacy Name: [facility]   What do they need to clarify?: IDC 10 CODES   Best Call Back Number: [phone]  Additional Information: Medicare Part B refuse to cover it unless [facility] calls and verifies it. The  IDC10  codes that were provided is not working at all. Plz call and confirm this with the pharmacy . Thanks

## 2022-10-26 ENCOUNTER — OFFICE VISIT (OUTPATIENT)
Dept: ENDOCRINOLOGY | Facility: CLINIC | Age: 82
End: 2022-10-26
Payer: MEDICARE

## 2022-10-26 VITALS
WEIGHT: 157.5 LBS | OXYGEN SATURATION: 96 % | TEMPERATURE: 98 F | HEART RATE: 77 BPM | HEIGHT: 61 IN | BODY MASS INDEX: 29.74 KG/M2 | SYSTOLIC BLOOD PRESSURE: 122 MMHG | DIASTOLIC BLOOD PRESSURE: 80 MMHG

## 2022-10-26 DIAGNOSIS — E55.9 HYPOVITAMINOSIS D: ICD-10-CM

## 2022-10-26 DIAGNOSIS — E21.3 HYPERPARATHYROIDISM: ICD-10-CM

## 2022-10-26 DIAGNOSIS — E04.2 MULTIPLE THYROID NODULES: Primary | ICD-10-CM

## 2022-10-26 DIAGNOSIS — M81.0 OSTEOPOROSIS, UNSPECIFIED OSTEOPOROSIS TYPE, UNSPECIFIED PATHOLOGICAL FRACTURE PRESENCE: ICD-10-CM

## 2022-10-26 PROCEDURE — 99213 OFFICE O/P EST LOW 20 MIN: CPT | Mod: S$PBB,,, | Performed by: PHYSICIAN ASSISTANT

## 2022-10-26 PROCEDURE — 99999 PR PBB SHADOW E&M-EST. PATIENT-LVL IV: ICD-10-PCS | Mod: PBBFAC,,, | Performed by: PHYSICIAN ASSISTANT

## 2022-10-26 PROCEDURE — 99214 OFFICE O/P EST MOD 30 MIN: CPT | Mod: PBBFAC,PO | Performed by: PHYSICIAN ASSISTANT

## 2022-10-26 PROCEDURE — 99213 PR OFFICE/OUTPT VISIT, EST, LEVL III, 20-29 MIN: ICD-10-PCS | Mod: S$PBB,,, | Performed by: PHYSICIAN ASSISTANT

## 2022-10-26 PROCEDURE — 99999 PR PBB SHADOW E&M-EST. PATIENT-LVL IV: CPT | Mod: PBBFAC,,, | Performed by: PHYSICIAN ASSISTANT

## 2022-10-26 NOTE — PROGRESS NOTES
CC: Thyroid Nodule    HPI: Gayathri Mack is a 82 y.o. female here for thyroid nodules along with pending conditions listed in the Visit Diagnosis. New to endocrine. Diagnosed in in 6/21 on a chest CT performed by her CT. +FHx of thyroid nodules in her daughter and mother.    Thyroid u/s showed a 1.9 and 1.5 cm nodule in the left lobe. FNA performed of 1.9 cm nodule was benign 5/22. Pt here w/ daughter. PTH slightly elevated. No n/v, abdominal pain or kidney stones.No n/v or abdominal pain. She has one kidney stone seen on imaging.     Dexa 2/22 shows osteoporosis. Taking fosamax weekly since 11/18. Her sister has osteoporosis. Taking ca and vd. No exercise. No falls, fractures or steriod injections.    PMHx, PSHx: reviewed in epic. COPD.    Social Hx: no ETOH/tobacco use. Former smoker who quit two years ago. She smoked 1 ppd since she was 19.       Wt Readings from Last 6 Encounters:   10/07/22 70.6 kg (155 lb 10.3 oz)   09/20/22 70.8 kg (156 lb)   06/22/22 70.9 kg (156 lb 4.9 oz)   05/26/22 70.5 kg (155 lb 6.8 oz)   05/13/22 71 kg (156 lb 8.4 oz)   05/08/22 72.1 kg (159 lb)      ROS:   Constitutional: No recent significant weight change  Eyes: No recent visual changes  Cardiovascular: Denies current anginal symptoms  Respiratory: Denies current respiratory difficulty  Gastrointestinal: Denies recent bowel disturbances  GenitoUrinary - No dysuria  Skin: No new skin rash  Neurologic: No focal neurologic complaints  Musculoskeletal: no joint pain  Endocrine: no polyphagia, polydipsia or polyuria  Remainder ROS negative     There were no vitals taken for this visit.     Personally reviewed labs below:    Lab Results   Component Value Date    TSH 1.070 10/12/2022    FREET4 0.90 10/12/2022        Chemistry        Component Value Date/Time     10/12/2022 0855    K 4.6 10/12/2022 0855     10/12/2022 0855    CO2 25 10/12/2022 0855    BUN 19 10/12/2022 0855    CREATININE 0.8 10/12/2022 0855     10/12/2022  0855        Component Value Date/Time    CALCIUM 9.2 10/12/2022 0855    ALKPHOS 67 05/08/2022 1046    AST 21 05/08/2022 1046    ALT 33 05/08/2022 1046    BILITOT 0.4 05/08/2022 1046    ESTGFRAFRICA >60 05/09/2022 0525    EGFRNONAA >60 05/09/2022 0525         No results found for: HGBA1C     PE:  GENERAL: elderly female, well developed, well nourished  NECK: Supple neck, normal thyroid. No bruit  LYMPHATIC: No cervical or supraclavicular lymphadenopathy  CARDIOVASCULAR: Normal heart sounds, no pedal edema  RESPIRATORY: Normal effort, clear to auscultation  MUSC: 2+ DTR UE/LE  NEURO: steady gait, CN ll-Xll grossly intact  PSYCH: normal mood and affect    Assessment/Plan:   1. Multiple thyroid nodules  TSH    PTH, Intact    Renal Function Panel      2. Osteoporosis, unspecified osteoporosis type, unspecified pathological fracture presence        3. Hypovitaminosis D  Vitamin D      4. Hyperparathyroidism  PTH, Intact    Calcium, Ionized         Nodular thyroid disease-stable-FNA benign. Repeat u/s in 2/23 .The 1.5 cm nodule was not identified during the FNA.  Postmenopausal-DEXA scan 2/24. Continue Fosamax until 11/23. Start taking 2000 IU of vitamin D and 1500 mg of calcium. Also, participate in weight bearing and resistance exercises for 40 min 4x weekly to maintain your bone density. Will check for secondary causes.  Hypovitaminosis c-jqrfgw-[id number] vd intake.  Hyperparathyroidism-recheck pth, iCa, Ca is normal.    F/u in 6 mths w/ labs and u/s

## 2022-11-02 ENCOUNTER — TELEPHONE (OUTPATIENT)
Dept: PULMONOLOGY | Facility: CLINIC | Age: 82
End: 2022-11-02
Payer: MEDICARE

## 2022-11-07 DIAGNOSIS — J44.89 COPD WITH ASTHMA: ICD-10-CM

## 2022-11-07 NOTE — TELEPHONE ENCOUNTER
Medication requesting - Trelegy Ellipta 200-62.5-25   Last Rx-10/07/2022 quanity -60 refill-0   Last office visit -10/07/2022  Next Office Visit-04/14/2023

## 2022-11-08 RX ORDER — FLUTICASONE FUROATE, UMECLIDINIUM BROMIDE AND VILANTEROL TRIFENATATE 200; 62.5; 25 UG/1; UG/1; UG/1
1 POWDER RESPIRATORY (INHALATION) DAILY
Qty: 60 EACH | Refills: 11 | Status: SHIPPED | OUTPATIENT
Start: 2022-11-08 | End: 2022-11-09 | Stop reason: SDUPTHER

## 2022-11-09 ENCOUNTER — PATIENT MESSAGE (OUTPATIENT)
Dept: FAMILY MEDICINE | Facility: CLINIC | Age: 82
End: 2022-11-09
Payer: MEDICARE

## 2022-11-09 DIAGNOSIS — J44.89 COPD WITH ASTHMA: ICD-10-CM

## 2022-11-09 RX ORDER — FLUTICASONE FUROATE, UMECLIDINIUM BROMIDE AND VILANTEROL TRIFENATATE 200; 62.5; 25 UG/1; UG/1; UG/1
1 POWDER RESPIRATORY (INHALATION) DAILY
Qty: 60 EACH | Refills: 11 | Status: SHIPPED | OUTPATIENT
Start: 2022-11-09 | End: 2022-12-15 | Stop reason: SDUPTHER

## 2022-11-09 NOTE — TELEPHONE ENCOUNTER
Medication requesting - Tatyana   Last Rx-10/23/2022 quanity -60 refill-0   Last office visit -10/07/2022  Next Office Visit-04/14/2023

## 2022-11-10 ENCOUNTER — EXTERNAL HOME HEALTH (OUTPATIENT)
Dept: HOME HEALTH SERVICES | Facility: HOSPITAL | Age: 82
End: 2022-11-10
Payer: MEDICARE

## 2022-11-29 ENCOUNTER — EXTERNAL HOME HEALTH (OUTPATIENT)
Dept: HOME HEALTH SERVICES | Facility: HOSPITAL | Age: 82
End: 2022-11-29
Payer: MEDICARE

## 2022-12-01 DIAGNOSIS — Z01.89 ENCOUNTER FOR ROUTINE LABORATORY TESTING: Primary | ICD-10-CM

## 2022-12-01 DIAGNOSIS — E78.5 HYPERLIPIDEMIA, UNSPECIFIED HYPERLIPIDEMIA TYPE: ICD-10-CM

## 2022-12-01 DIAGNOSIS — R79.89 ABNORMAL CBC: ICD-10-CM

## 2022-12-02 NOTE — TELEPHONE ENCOUNTER
No new care gaps identified.  [facility] Embedded Care Gaps. Reference number: [id number]. 12/01/2022   11:32:15 PM CST

## 2022-12-05 RX ORDER — EZETIMIBE 10 MG/1
10 TABLET ORAL DAILY
Qty: 90 TABLET | Refills: 0 | Status: SHIPPED | OUTPATIENT
Start: 2022-12-05 | End: 2023-01-23

## 2022-12-13 ENCOUNTER — CLINICAL SUPPORT (OUTPATIENT)
Dept: AUDIOLOGY | Facility: CLINIC | Age: 82
End: 2022-12-13
Payer: MEDICARE

## 2022-12-13 DIAGNOSIS — H93.293 IMPAIRMENT OF AUDITORY DISCRIMINATION OF BOTH EARS: ICD-10-CM

## 2022-12-13 DIAGNOSIS — H90.3 SENSORINEURAL HEARING LOSS (SNHL), BILATERAL: Primary | ICD-10-CM

## 2022-12-13 PROCEDURE — 92603 COCHLEAR IMPLT F/UP EXAM 7/>: CPT | Mod: PBBFAC | Performed by: AUDIOLOGIST

## 2022-12-14 DIAGNOSIS — R05.9 COUGH, UNSPECIFIED TYPE: ICD-10-CM

## 2022-12-14 DIAGNOSIS — J44.89 COPD WITH ASTHMA: ICD-10-CM

## 2022-12-14 DIAGNOSIS — J44.9 CHRONIC OBSTRUCTIVE PULMONARY DISEASE, UNSPECIFIED COPD TYPE: Primary | ICD-10-CM

## 2022-12-14 RX ORDER — MONTELUKAST SODIUM 10 MG/1
10 TABLET ORAL DAILY
Qty: 60 TABLET | Refills: 0 | Status: SHIPPED | OUTPATIENT
Start: 2022-12-14 | End: 2022-12-15

## 2022-12-14 NOTE — TELEPHONE ENCOUNTER
Medication requesting - Montelukast 10mg tab.   Last Rx-11/09/2022 quanity -90 refill-0   Last office visit -10/07/2022  Next Office Visit-04/14/2022

## 2022-12-15 RX ORDER — MONTELUKAST SODIUM 10 MG/1
10 TABLET ORAL DAILY
Qty: 60 TABLET | Refills: 11 | Status: SHIPPED | OUTPATIENT
Start: 2022-12-15 | End: 2023-10-24 | Stop reason: SDUPTHER

## 2022-12-15 RX ORDER — FLUTICASONE FUROATE, UMECLIDINIUM BROMIDE AND VILANTEROL TRIFENATATE 200; 62.5; 25 UG/1; UG/1; UG/1
1 POWDER RESPIRATORY (INHALATION) DAILY
Qty: 60 EACH | Refills: 11 | Status: SHIPPED | OUTPATIENT
Start: 2022-12-15 | End: 2023-09-08 | Stop reason: SDUPTHER

## 2022-12-16 NOTE — PROGRESS NOTES
12/13/2022    Cochlear Implant Programming Session:    LEFT EAR: Dr. Carson Mueller ([place], [place])  : [employer]   Internal device/serial number: Sonata implant (SN: [id number])  Processor: HUSEYIN 3  DOS: 02/25/2008  DOI:  12/13/2022  Magnet strength: 1  Battery type: RECHARGEABLE     AUDIOLINK  FINE TUNER     Ms. Gayathri Mack was seen today for a cochlear implant programming session to upgrade her HUSEYIN sound processor to the HUSEYIN 3 sound processor.  All cochlear implant supplies were issued today.      Impedance testing was completed.  The HUSEYIN sound processor was set today based on M levels and balanced for comfort.  Ms. Mack reported reduced sound with her head coverings and requested a louder map.  Four progressive maps were created so that she would have options with her different head coverings and wigs.  Ms. Mack was pleased with this option.  Ms. Mack was instructed on the use of the remote control to adjust her volume and programs.    Information on [employer] and additional resources were provided today.     Recommend:  Follow up programming as needed.  Annual evaluation.

## 2022-12-29 RX ORDER — LISINOPRIL 20 MG/1
20 TABLET ORAL DAILY
Qty: 90 TABLET | Refills: 0 | Status: SHIPPED | OUTPATIENT
Start: 2022-12-29 | End: 2023-02-07 | Stop reason: SDUPTHER

## 2022-12-29 NOTE — TELEPHONE ENCOUNTER
No new care gaps identified.  [facility] Embedded Care Gaps. Reference number: [id number]. 12/28/2022   9:45:45 PM CST

## 2023-01-09 ENCOUNTER — TELEPHONE (OUTPATIENT)
Dept: PULMONOLOGY | Facility: CLINIC | Age: 83
End: 2023-01-09
Payer: MEDICARE

## 2023-01-09 NOTE — TELEPHONE ENCOUNTER
----- Message from Stephanie Garrett sent at 1/9/2023  3:09 PM CST -----  Regarding: advice  Contact: patient  Type: Needs Medical Advice  Who Called:  patient  Symptoms (please be specific):  covid positive, coughing, wheezing, Low grade fever  How long has patient had these symptoms:    Pharmacy name and phone #:    [id number] - [place] - [address] AT [address] [facility]  [address]  [place] [address]  Phone: [phone] Fax: [phone]  Best Call Back Number: [phone]  Additional Information: Patient is asking if there is anything they can giver her to help the symptoms. Please call patient to advise.Thanks!

## 2023-01-09 NOTE — TELEPHONE ENCOUNTER
Tried calling but just rings   ----- Message from Carmencita Narvaezard sent at 1/9/2023  9:39 AM CST -----  Regarding: COIVD +; SAME DAY CALL BACK REQUEST  Contact: Patient  Type: Patient Call Back         Who called: Patient         What is the request in detail: calling to speak with staff; states that she was covid + since Tuesday; would like to speak with office for advice; please advise         Best call back number: [phone]         Additional Information:   [id number] - [place] - [address] [facility]  [address] [address]  Phone: [phone] Fax: [phone]             Thank You

## 2023-01-12 ENCOUNTER — TELEPHONE (OUTPATIENT)
Dept: FAMILY MEDICINE | Facility: CLINIC | Age: 83
End: 2023-01-12
Payer: MEDICARE

## 2023-01-12 NOTE — TELEPHONE ENCOUNTER
----- Message from Afshin Duong, Patient Care Assistant sent at 1/12/2023 12:47 PM CST -----  Contact: Pt Daughter  Type:  RX Refill Request    Who Called:  Pt Daughter  Refill or New Rx:  New Rx   RX Name and Strength:  Anti-Viral Medication for Covid+  How is the patient currently taking it? (ex. 1XDay):  As Directed  Is this a 30 day or 90 day RX:  15  Preferred Pharmacy with phone number:    [facility] [address] [facility].   [address]  Phone: [phone]  Local or Mail Order:  local  Ordering Provider:  Say Keita Call Back Number:  [phone]  Additional Information:  Please contact pt upon completion-Thank you~

## 2023-01-18 ENCOUNTER — OFFICE VISIT (OUTPATIENT)
Dept: PULMONOLOGY | Facility: CLINIC | Age: 83
End: 2023-01-18
Payer: MEDICARE

## 2023-01-18 ENCOUNTER — TELEPHONE (OUTPATIENT)
Dept: PULMONOLOGY | Facility: CLINIC | Age: 83
End: 2023-01-18
Payer: MEDICARE

## 2023-01-18 DIAGNOSIS — U07.1 BRONCHITIS DUE TO COVID-19 VIRUS: Primary | ICD-10-CM

## 2023-01-18 DIAGNOSIS — R05.1 ACUTE COUGH: ICD-10-CM

## 2023-01-18 DIAGNOSIS — R11.0 NAUSEA: ICD-10-CM

## 2023-01-18 DIAGNOSIS — J40 BRONCHITIS DUE TO COVID-19 VIRUS: Primary | ICD-10-CM

## 2023-01-18 PROCEDURE — 99213 PR OFFICE/OUTPT VISIT, EST, LEVL III, 20-29 MIN: ICD-10-PCS | Mod: CR,95,, | Performed by: NURSE PRACTITIONER

## 2023-01-18 PROCEDURE — 99213 OFFICE O/P EST LOW 20 MIN: CPT | Mod: CR,95,, | Performed by: NURSE PRACTITIONER

## 2023-01-18 RX ORDER — ONDANSETRON 4 MG/1
4 TABLET, FILM COATED ORAL EVERY 8 HOURS PRN
Qty: 30 TABLET | Refills: 0 | Status: SHIPPED | OUTPATIENT
Start: 2023-01-18 | End: 2023-04-14 | Stop reason: SDUPTHER

## 2023-01-18 RX ORDER — PROMETHAZINE HYDROCHLORIDE AND CODEINE PHOSPHATE 6.25; 1 MG/5ML; MG/5ML
5 SOLUTION ORAL EVERY 4 HOURS PRN
Qty: 210 ML | Refills: 0 | Status: SHIPPED | OUTPATIENT
Start: 2023-01-18 | End: 2023-01-25

## 2023-01-18 RX ORDER — BENZONATATE 200 MG/1
200 CAPSULE ORAL 3 TIMES DAILY PRN
Qty: 30 CAPSULE | Refills: 0 | Status: SHIPPED | OUTPATIENT
Start: 2023-01-18 | End: 2023-01-28

## 2023-01-18 RX ORDER — AZITHROMYCIN 500 MG/1
500 TABLET, FILM COATED ORAL DAILY
Qty: 3 TABLET | Refills: 0 | Status: SHIPPED | OUTPATIENT
Start: 2023-01-18 | End: 2023-01-21

## 2023-01-18 NOTE — PROGRESS NOTES
1/18/2023    Gayathri Mack  Office Note    No chief complaint on file.    The patient location is: home, [facility]  The chief complaint leading to consultation is: COVID 19 bronchitis    Visit type: audiovisual    Face to Face time with patient: 20 minutes   24 minutes of total time spent on the encounter, which includes face to face time and non-face to face time preparing to see the patient (eg, review of tests), Obtaining and/or reviewing separately obtained history, Documenting clinical information in the electronic or other health record, Independently interpreting results (not separately reported) and communicating results to the patient/family/caregiver, or Care coordination (not separately reported).         Each patient to whom he or she provides medical services by telemedicine is:  (1) informed of the relationship between the physician and patient and the respective role of any other health care provider with respect to management of the patient; and (2) notified that he or she may decline to receive medical services by telemedicine and may withdraw from such care at any time.    Notes:       HPI:   1/18/23- covid 19 positive started on Jan 4 at home test, complaint of severe cough, productive thick green mucous, associated with nausea and fatigue. On mucinex and nebulizer therapy daily.      The chief compliant  problem is worsening    Review of Systems:  a review of eleven systems covering constitutional, Eye, HEENT, Psych, Respiratory, Cardiac, GI, , Musculoskeletal, Endocrine, Dermatologic was negative except for pertinent findings as listed ABOVE and below: pertinent positive as above, rest is good  Shortness of breath    Cough  Nausea  fatigue     Exam:Comprehensive exam done. There were no vitals taken for this visit.   Constitutional:  oriented to person, place, and time.  appears well-developed. No distress.   Nose: Nose normal.     Eyes: Pupils are equal, round,   Neck: No JVD present. No  thyromegaly visible     Pulmonary/Chest: Effort normal and audible breath sounds normal. No accessory muscle usage or stridor. No apnea and no tachypnea. No respiratory distress,     Skin: tone/color normal. No palor    Psychiatric: normal mood and affect. behavior is normal. Judgment and thought content normal.         Plan:  Clinical impression is apparently straight forward and impression with management as below.    Diagnoses and all orders for this visit:    Bronchitis due to COVID-19 virus  -     promethazine-codeine 6.25-10 mg/5 ml (PHENERGAN WITH CODEINE) 6.25-10 mg/5 mL syrup; Take 5 mLs by mouth every 4 (four) hours as needed for Cough.  -     benzonatate (TESSALON) 200 MG capsule; Take 1 capsule (200 mg total) by mouth 3 (three) times daily as needed for Cough.  -     ondansetron (ZOFRAN) 4 MG tablet; Take 1 tablet (4 mg total) by mouth every 8 (eight) hours as needed for Nausea.  -     azithromycin (ZITHROMAX) 500 MG tablet; Take 1 tablet (500 mg total) by mouth once daily. for 3 days    Nausea  -     promethazine-codeine 6.25-10 mg/5 ml (PHENERGAN WITH CODEINE) 6.25-10 mg/5 mL syrup; Take 5 mLs by mouth every 4 (four) hours as needed for Cough.  -     ondansetron (ZOFRAN) 4 MG tablet; Take 1 tablet (4 mg total) by mouth every 8 (eight) hours as needed for Nausea.    Acute cough  -     promethazine-codeine 6.25-10 mg/5 ml (PHENERGAN WITH CODEINE) 6.25-10 mg/5 mL syrup; Take 5 mLs by mouth every 4 (four) hours as needed for Cough.  -     benzonatate (TESSALON) 200 MG capsule; Take 1 capsule (200 mg total) by mouth 3 (three) times daily as needed for Cough.        Follow up in about 3 months (around 4/18/2023), or if symptoms worsen or fail to improve.    Discussed with patient above for education the following:      Patient Instructions   Use codeine cough syrup sparingly, this will decrease cough and allow you to rest at night, do not take with other pain or sleeping medications.      Antibiotics    Continue nebulizer 2 times daily    Zofran for nausea

## 2023-01-18 NOTE — TELEPHONE ENCOUNTER
Spoke to daughter stating multiple times calls have been made no answer. Putting pt on for virtual visit today.   ----- Message from Aisha Valero sent at 1/18/2023 12:07 PM CST -----  Type: Needs Medical Advice  Who Called:  pt miracle valerio     Best Call Back Number: [phone]   Additional Information: please call the pt . This is her daughters  3rd attempt. Pt has Covid , suffering  and is not happy with the overall care the pt is getting here.

## 2023-01-18 NOTE — PATIENT INSTRUCTIONS
Use codeine cough syrup sparingly, this will decrease cough and allow you to rest at night, do not take with other pain or sleeping medications.     Antibiotics    Continue nebulizer 2 times daily    Zofran for nausea

## 2023-01-23 DIAGNOSIS — M81.0 AGE RELATED OSTEOPOROSIS, UNSPECIFIED PATHOLOGICAL FRACTURE PRESENCE: ICD-10-CM

## 2023-01-23 DIAGNOSIS — I25.10 CORONARY ARTERY DISEASE INVOLVING NATIVE CORONARY ARTERY OF NATIVE HEART WITHOUT ANGINA PECTORIS: Primary | ICD-10-CM

## 2023-01-23 DIAGNOSIS — E78.5 HYPERLIPIDEMIA, UNSPECIFIED HYPERLIPIDEMIA TYPE: ICD-10-CM

## 2023-01-23 RX ORDER — ALENDRONATE SODIUM 70 MG/1
70 TABLET ORAL
Qty: 12 TABLET | Refills: 0 | Status: SHIPPED | OUTPATIENT
Start: 2023-01-23 | End: 2023-02-07 | Stop reason: SDUPTHER

## 2023-01-23 RX ORDER — EZETIMIBE 10 MG/1
10 TABLET ORAL DAILY
Qty: 90 TABLET | Refills: 0 | Status: SHIPPED | OUTPATIENT
Start: 2023-01-23 | End: 2023-02-07 | Stop reason: SDUPTHER

## 2023-01-23 NOTE — TELEPHONE ENCOUNTER
No new care gaps identified.  [facility] Embedded Care Gaps. Reference number: [id number]. 1/23/2023   11:32:26 AM CST

## 2023-01-30 ENCOUNTER — TELEPHONE (OUTPATIENT)
Dept: PULMONOLOGY | Facility: CLINIC | Age: 83
End: 2023-01-30
Payer: MEDICARE

## 2023-01-30 ENCOUNTER — NURSE TRIAGE (OUTPATIENT)
Dept: ADMINISTRATIVE | Facility: CLINIC | Age: 83
End: 2023-01-30
Payer: MEDICARE

## 2023-01-30 NOTE — TELEPHONE ENCOUNTER
Spoke to daughter of pt, sent message to sharif. Pt having sore throat has tried everything.     ----- Message from Vivian Nielson sent at 1/30/2023  4:44 PM CST -----  Regarding: pt call back requested  Name of Who is Calling:DRAGAN SHUKLA [[id number]]          What is the request in detail: pt throat hurt when swallowing           Can the clinic reply by [facility]: no           What Number to Call Back if not in [facility]: [phone] (home)

## 2023-01-30 NOTE — TELEPHONE ENCOUNTER
Patient daughter calling on behalf of patient. Daughter states she originally called to make an appt but was transferred over to triage nurse, however, in the meantime someone from the clinic returned her call and stated they would send a message over to Yolanda. Daughter does not need triage for patient at this time. Advised to call back if symptoms become worse or with further questions.         Reason for Disposition   Caller has already spoken with another triager or PCP AND has further questions AND triager able to answer questions.    Protocols used: No Contact or Duplicate Contact Call-A-

## 2023-01-31 ENCOUNTER — TELEPHONE (OUTPATIENT)
Dept: PULMONOLOGY | Facility: CLINIC | Age: 83
End: 2023-01-31
Payer: MEDICARE

## 2023-01-31 DIAGNOSIS — B37.0 ORAL THRUSH: ICD-10-CM

## 2023-01-31 DIAGNOSIS — B37.0 ORAL THRUSH: Primary | ICD-10-CM

## 2023-01-31 RX ORDER — NYSTATIN 100000 [USP'U]/ML
4 SUSPENSION ORAL 4 TIMES DAILY
Qty: 160 ML | Refills: 0 | Status: SHIPPED | OUTPATIENT
Start: 2023-01-31 | End: 2023-01-31 | Stop reason: SDUPTHER

## 2023-01-31 RX ORDER — NYSTATIN 100000 [USP'U]/ML
4 SUSPENSION ORAL 4 TIMES DAILY
Qty: 160 ML | Refills: 0 | Status: SHIPPED | OUTPATIENT
Start: 2023-01-31 | End: 2023-02-10

## 2023-01-31 NOTE — TELEPHONE ENCOUNTER
Sent to pharmacy   ----- Message from Yolanda Albarran NP sent at 1/31/2023  2:14 PM CST -----  Regarding: RE: rx for thrush  Nystatin sent to local pharmacy on file.       ----- Message -----  From: Jasmina Wilkins MA  Sent: 1/31/2023   1:56 PM CST  To: Yolanda Albarran NP  Subject: rx for thrush                                    Pt needs rx for thrush, please her throat has been hurting with white all over.

## 2023-01-31 NOTE — TELEPHONE ENCOUNTER
Attempted to reach patient to find out which medication she needs sent to the pharmacy.  Left voicemail.

## 2023-01-31 NOTE — TELEPHONE ENCOUNTER
----- Message from Yudy Ramos sent at 1/31/2023  4:01 PM CST -----  Regarding: medication  Contact: daughter  Type: Needs Medical Advice  Who Called:  Daughter  Symptoms (please be specific):  medication  How long has patient had these symptoms:    Pharmacy name and phone #:    Best Call Back Number: [phone] (home)     Additional Information: [facility] [address] Thanks!

## 2023-02-02 DIAGNOSIS — Z87.09 HISTORY OF COPD: ICD-10-CM

## 2023-02-02 DIAGNOSIS — R05.9 COUGH: ICD-10-CM

## 2023-02-02 DIAGNOSIS — J06.9 ACUTE URI: ICD-10-CM

## 2023-02-02 DIAGNOSIS — R06.2 WHEEZING: ICD-10-CM

## 2023-02-02 RX ORDER — IPRATROPIUM BROMIDE AND ALBUTEROL SULFATE 2.5; .5 MG/3ML; MG/3ML
3 SOLUTION RESPIRATORY (INHALATION) EVERY 6 HOURS PRN
Qty: 25 EACH | Refills: 2 | Status: SHIPPED | OUTPATIENT
Start: 2023-02-02 | End: 2023-02-03 | Stop reason: SDUPTHER

## 2023-02-02 NOTE — TELEPHONE ENCOUNTER
----- Message from Elda Fuller sent at 2/2/2023  2:17 PM CST -----  Contact: [phone]  Type:  Patient Returning Call    Who Called:  Pt   Who Left Message for Patient:  Yoselyn   Does the patient know what this is regarding?:  Medication reill  Best Call Back Number:  [phone] (home)     Additional Information:  Pt stated you can send her medication to [facility]. Pt does not know name of medication       [id number] - [place] - [address] [facility]  [address] [address]  Phone: [phone] Fax: [phone]

## 2023-02-03 ENCOUNTER — EXTERNAL HOME HEALTH (OUTPATIENT)
Dept: HOME HEALTH SERVICES | Facility: HOSPITAL | Age: 83
End: 2023-02-03
Payer: MEDICARE

## 2023-02-03 RX ORDER — IPRATROPIUM BROMIDE AND ALBUTEROL SULFATE 2.5; .5 MG/3ML; MG/3ML
3 SOLUTION RESPIRATORY (INHALATION) EVERY 6 HOURS PRN
Qty: 25 EACH | Refills: 2 | Status: SHIPPED | OUTPATIENT
Start: 2023-02-03 | End: 2023-12-06 | Stop reason: SDUPTHER

## 2023-02-03 NOTE — TELEPHONE ENCOUNTER
Medication requesting - Albuterol Ipratropi/ALB 0.5/3mg INH SOL.   Last Rx-01/04/2023 quanity -90 refill-0   Last office visit -01/18/2023  Next Office Visit-04/14/2023

## 2023-02-06 ENCOUNTER — TELEPHONE (OUTPATIENT)
Dept: PULMONOLOGY | Facility: CLINIC | Age: 83
End: 2023-02-06
Payer: MEDICARE

## 2023-02-06 NOTE — TELEPHONE ENCOUNTER
#[id number]      Deniedon February 3  Request Reference Number: [id number]. IPRATROPIUM/ SOL ALBUTER is denied for not meeting the prior authorization requirement(s). Details of this decision are in the notice attached below or have been faxed to you.    Will need to send script to [facility]

## 2023-02-07 ENCOUNTER — OFFICE VISIT (OUTPATIENT)
Dept: FAMILY MEDICINE | Facility: CLINIC | Age: 83
End: 2023-02-07
Payer: MEDICARE

## 2023-02-07 VITALS
DIASTOLIC BLOOD PRESSURE: 78 MMHG | SYSTOLIC BLOOD PRESSURE: 122 MMHG | BODY MASS INDEX: 29.54 KG/M2 | WEIGHT: 156.44 LBS | HEIGHT: 61 IN | HEART RATE: 70 BPM

## 2023-02-07 DIAGNOSIS — I10 ESSENTIAL HYPERTENSION: ICD-10-CM

## 2023-02-07 DIAGNOSIS — R79.89 ABNORMAL CBC: ICD-10-CM

## 2023-02-07 DIAGNOSIS — Z00.00 MEDICARE ANNUAL WELLNESS VISIT, SUBSEQUENT: Primary | ICD-10-CM

## 2023-02-07 DIAGNOSIS — M81.0 AGE RELATED OSTEOPOROSIS, UNSPECIFIED PATHOLOGICAL FRACTURE PRESENCE: ICD-10-CM

## 2023-02-07 DIAGNOSIS — E78.2 MIXED HYPERLIPIDEMIA: ICD-10-CM

## 2023-02-07 DIAGNOSIS — I77.810 ASCENDING AORTA DILATATION: ICD-10-CM

## 2023-02-07 DIAGNOSIS — E04.2 MULTIPLE THYROID NODULES: ICD-10-CM

## 2023-02-07 DIAGNOSIS — E78.5 HYPERLIPIDEMIA, UNSPECIFIED HYPERLIPIDEMIA TYPE: ICD-10-CM

## 2023-02-07 DIAGNOSIS — J43.2 CENTRILOBULAR EMPHYSEMA: ICD-10-CM

## 2023-02-07 DIAGNOSIS — I25.10 CORONARY ARTERY DISEASE INVOLVING NATIVE CORONARY ARTERY OF NATIVE HEART WITHOUT ANGINA PECTORIS: ICD-10-CM

## 2023-02-07 DIAGNOSIS — Z01.89 ENCOUNTER FOR ROUTINE LABORATORY TESTING: ICD-10-CM

## 2023-02-07 PROCEDURE — 99214 PR OFFICE/OUTPT VISIT, EST, LEVL IV, 30-39 MIN: ICD-10-PCS | Mod: S$PBB,25,, | Performed by: INTERNAL MEDICINE

## 2023-02-07 PROCEDURE — G0439 PPPS, SUBSEQ VISIT: HCPCS | Mod: ,,, | Performed by: INTERNAL MEDICINE

## 2023-02-07 PROCEDURE — 99999 PR PBB SHADOW E&M-EST. PATIENT-LVL III: CPT | Mod: PBBFAC,,, | Performed by: INTERNAL MEDICINE

## 2023-02-07 PROCEDURE — 99213 OFFICE O/P EST LOW 20 MIN: CPT | Mod: PBBFAC,PN | Performed by: INTERNAL MEDICINE

## 2023-02-07 PROCEDURE — G0439 PR MEDICARE ANNUAL WELLNESS SUBSEQUENT VISIT: ICD-10-PCS | Mod: ,,, | Performed by: INTERNAL MEDICINE

## 2023-02-07 PROCEDURE — 99999 PR PBB SHADOW E&M-EST. PATIENT-LVL III: ICD-10-PCS | Mod: PBBFAC,,, | Performed by: INTERNAL MEDICINE

## 2023-02-07 PROCEDURE — 99214 OFFICE O/P EST MOD 30 MIN: CPT | Mod: S$PBB,25,, | Performed by: INTERNAL MEDICINE

## 2023-02-07 RX ORDER — ALENDRONATE SODIUM 70 MG/1
70 TABLET ORAL
Qty: 12 TABLET | Refills: 0 | Status: SHIPPED | OUTPATIENT
Start: 2023-02-07 | End: 2023-05-29

## 2023-02-07 RX ORDER — ROSUVASTATIN CALCIUM 10 MG/1
5 TABLET, COATED ORAL DAILY
COMMUNITY
End: 2023-10-24 | Stop reason: SDUPTHER

## 2023-02-07 RX ORDER — LISINOPRIL 20 MG/1
20 TABLET ORAL DAILY
Qty: 90 TABLET | Refills: 0 | Status: SHIPPED | OUTPATIENT
Start: 2023-02-07 | End: 2023-06-23 | Stop reason: SDUPTHER

## 2023-02-07 RX ORDER — EZETIMIBE 10 MG/1
10 TABLET ORAL DAILY
Qty: 90 TABLET | Refills: 0 | Status: SHIPPED | OUTPATIENT
Start: 2023-02-07 | End: 2023-05-05

## 2023-02-07 NOTE — PROGRESS NOTES
The following components were reviewed and updated:   Medical history, Family History, Social history,Allergies and Current Medications, Health Risk Assessment, Health Maintenance, Living Situation, Depression Screening.     HRA: patient feels overall is healthy.  Psychosocial and behavioral risks discussed.  BMI - 29  Weight loss discussed.   Diet - well balanced.   ADL: self sufficient in all  Instrumental ADL: patient is able to manage things like their medications and finances.    Memory or cognitive function - Patient has no issues with either   Ambulates normal. No recent falls.  Exercise - walks   Depression screening is negative.  Hearing--+ cocuualr implant left 2008 // right ear sig hearing loss- hearing aid wouldn't help.   Vision - no deficits.   Incontinence - none  Opiate Screen- Negative     Preventative health needs discussed and patient was given a printed list of what they have received and what they will need with in the next 5-10 years. Recommendations developed using the USPSTF age appropriate recommendations. Education, counseling, and referrals were provided as needed.   Screening schedule reviewed with patient     Advanced Care directive: recommend  living will & POA   (Ie: pick a person who would make decisions for you if you were unable to make them for yourself, called a health care power of , and what kind of decisions you might make such as use of life sustaining treatments such as ventilators, tube feeding when faced with a life limiting illness recorded on a living will.     I have reviewed and updated the patient's current list of providers.     In addition to the patient's preventative review and discussion today, the patient also has other issues to discuss today with a separate summary of plan below:     Subjective:       Patient ID: Gayathri Mack is a 82 y.o. female.    Chief Complaint: Annual Exam (Annual check up. Will call to update meds.)   HPI  Gyn: no defers  MMG:   No defers  Dexa: 2020 get old records//[place] // 2022 Osteoporosis Fosamax -plan this yr is Prolia   Colonoscopy:  never   Immunizations: Flu:  Yes Tdap:  Get old record Pneumovax: 2018 Prevnar 13:  2014 Shingles: yes Covid: yes   Smoker: former - quit 2020     Wellness -- due for labs     Recently had covid infection - Jan 2023 since then her right ear feels full and reduced hearing.      Removed cerumen w visit      Thyroid nodule: s/p biospy. 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 2022: CT + right apex nodule.      Cochlear implant lift -- hearing loss since child     Hypertension:  Controlled Rx lisinopril 20    Osteoporosis: last testing 2020  Fosamax 70    Alopecia wears a wig    ____________________________________________________________________________________________________  Assessment & Plan:  1. Medicare annual wellness visit, subsequent    2. Essential hypertension  - lisinopriL (PRINIVIL,ZESTRIL) 20 MG tablet; Take 1 tablet (20 mg total) by mouth once daily.  Dispense: 90 tablet; Refill: 0    3. Multiple thyroid nodules    4. Hyperlipidemia, unspecified hyperlipidemia type  - ezetimibe (ZETIA) 10 mg tablet; Take 1 tablet (10 mg total) by mouth once daily.  Dispense: 90 tablet; Refill: 0    5. Age related osteoporosis, unspecified pathological fracture presence  - alendronate (FOSAMAX) 70 MG tablet; Take 1 tablet (70 mg total) by mouth every 7 days.  Dispense: 12 tablet; Refill: 0    6. Coronary artery disease involving native coronary artery of native heart without angina pectoris  - ezetimibe (ZETIA) 10 mg tablet; Take 1 tablet (10 mg total) by mouth once daily.  Dispense: 90 tablet; Refill: 0    7. Abnormal CBC    8. Encounter  for routine laboratory testing    9. Ascending aorta dilatation    10. Centrilobular emphysema    11. Mixed hyperlipidemia     Medicare annual wellness visit, subsequent    Essential hypertension  -     lisinopriL (PRINIVIL,ZESTRIL) 20 MG tablet; Take 1 tablet (20 mg total) by mouth once daily.  Dispense: 90 tablet; Refill: 0    Multiple thyroid nodules  -     Cancel: TSH; Future; Expected date: 02/07/2023  -     Cancel: T4, Free; Future; Expected date: 02/07/2023    Hyperlipidemia, unspecified hyperlipidemia type  -     Cancel: Comprehensive Metabolic Panel; Future; Expected date: 02/07/2023  -     Cancel: Lipid Panel; Future; Expected date: 02/07/2023  -     ezetimibe (ZETIA) 10 mg tablet; Take 1 tablet (10 mg total) by mouth once daily.  Dispense: 90 tablet; Refill: 0    Age related osteoporosis, unspecified pathological fracture presence  -     alendronate (FOSAMAX) 70 MG tablet; Take 1 tablet (70 mg total) by mouth every 7 days.  Dispense: 12 tablet; Refill: 0    Coronary artery disease involving native coronary artery of native heart without angina pectoris  -     ezetimibe (ZETIA) 10 mg tablet; Take 1 tablet (10 mg total) by mouth once daily.  Dispense: 90 tablet; Refill: 0    Abnormal CBC  -     Cancel: CBC Without Differential; Future; Expected date: 02/07/2023    Encounter for routine laboratory testing  -     Cancel: TSH; Future; Expected date: 02/07/2023  -     Cancel: T4, Free; Future; Expected date: 02/07/2023  -     Cancel: CBC Without Differential; Future; Expected date: 02/07/2023  -     Cancel: Comprehensive Metabolic Panel; Future; Expected date: 02/07/2023  -     Cancel: Lipid Panel; Future; Expected date: 02/07/2023  -     Cancel: PTH, intact; Future; Expected date: 02/07/2023    Ascending aorta dilatation    Centrilobular emphysema    Mixed hyperlipidemia        Continue to work on regular exercise, maintain healthy weight, balanced diet. Avoid unhealthy habits: smoking, excessive alcohol  intake.     Disclaimer: This note was partly generated using dictation software which may occasionally result in transcription errors  ____________________________________________________________________________________________________  Review of Systems:  Review of Systems   Constitutional:  Negative for appetite change.   HENT:  Positive for hearing loss. Negative for nosebleeds.    Eyes:  Negative for pain.   Respiratory:  Negative for choking.    Cardiovascular:  Negative for chest pain.   Gastrointestinal:  Negative for blood in stool.   Genitourinary:  Negative for hematuria.   Musculoskeletal:  Negative for joint swelling.   Skin:  Negative for pallor.   Neurological:  Negative for facial asymmetry.   Hematological:  Does not bruise/bleed easily.   Psychiatric/Behavioral:  Negative for confusion.      Objective:     Wt Readings from Last 3 Encounters:   02/07/23 70.9 kg (156 lb 6.7 oz)   10/26/22 71.5 kg (157 lb 8.3 oz)   10/07/22 70.6 kg (155 lb 10.3 oz)     BP Readings from Last 3 Encounters:   02/07/23 122/78   10/26/22 122/80   10/07/22 (!) 128/92       Lab Results   Component Value Date    WBC 8.68 02/13/2023    HGB 14.0 02/13/2023    HCT 46.5 02/13/2023     02/13/2023     02/13/2023    K 5.4 (H) 02/13/2023     02/13/2023    ALT 35 02/13/2023    AST 38 02/13/2023    CO2 21 (L) 02/13/2023    CREATININE 0.8 02/13/2023    BUN 19 02/13/2023    GLU 74 02/13/2023      Hemoglobin A1C   Date Value Ref Range Status   02/13/2023 5.7 (H) 4.0 - 5.6 % Final     Comment:     ADA Screening Guidelines:  5.7-6.4%  Consistent with prediabetes  >or=6.5%  Consistent with diabetes    High levels of fetal hemoglobin interfere with the HbA1C  assay. Heterozygous hemoglobin variants (HbS, HgC, etc)do  not significantly interfere with this assay.   However, presence of multiple variants may affect accuracy.        Lab Results   Component Value Date    TSH 1.347 02/13/2023    TSH 1.070 10/12/2022    TSH 1.064  02/09/2022     Lab Results   Component Value Date    FREET4 1.30 02/13/2023    FREET4 0.90 10/12/2022    FREET4 0.96 02/09/2022     Lab Results   Component Value Date    LDLCALC 76.0 02/13/2023    LDLCALC 169.6 (H) 01/26/2022    LDLCALC 155.6 06/16/2021     Lab Results   Component Value Date    TRIG 95 02/13/2023    TRIG 107 01/26/2022    TRIG 112 06/16/2021            Physical Exam  Constitutional:       Appearance: Normal appearance.   HENT:      Head: Normocephalic and atraumatic.      Comments: + cerumen right ear -manually removed soft wax   Eyes:      Extraocular Movements: Extraocular movements intact.      Pupils: Pupils are equal, round, and reactive to light.   Cardiovascular:      Rate and Rhythm: Normal rate and regular rhythm.   Pulmonary:      Effort: Pulmonary effort is normal.      Breath sounds: Normal breath sounds.   Neurological:      Mental Status: She is alert and oriented to person, place, and time.   Psychiatric:         Mood and Affect: Mood normal.       Medication List with Changes/Refills   Current Medications    ALBUTEROL (VENTOLIN HFA) 90 MCG/ACTUATION INHALER    Inhale 2 puffs into the lungs every 4 (four) hours as needed for Wheezing or Shortness of Breath. Rescue    ALBUTEROL-IPRATROPIUM (DUO-NEB) 2.5 MG-0.5 MG/3 ML NEBULIZER SOLUTION    Take 3 mLs by nebulization every 6 (six) hours as needed for Wheezing. Rescue    ASPIRIN (ECOTRIN) 81 MG EC TABLET    Take 1 tablet (81 mg total) by mouth once daily.    AZELASTINE (ASTELIN) 137 MCG (0.1 %) NASAL SPRAY    1 spray (137 mcg total) by Nasal route 2 (two) times daily.    FLUTICASONE-UMECLIDIN-VILANTER (TRELEGY ELLIPTA) 200-62.5-25 MCG INHALER    Inhale 1 puff into the lungs once daily.    MELATONIN 300 MCG TAB    Take 300 mcg by mouth nightly as needed (insomnia).    MONTELUKAST (SINGULAIR) 10 MG TABLET    Take 1 tablet (10 mg total) by mouth once daily.    ROSUVASTATIN (CRESTOR) 10 MG TABLET    Take 5 mg by mouth once daily.   Changed  and/or Refilled Medications    Modified Medication Previous Medication    ALENDRONATE (FOSAMAX) 70 MG TABLET alendronate (FOSAMAX) 70 MG tablet       Take 1 tablet (70 mg total) by mouth every 7 days.    Take 1 tablet (70 mg total) by mouth every 7 days. ((Due labs & visit)) call office    EZETIMIBE (ZETIA) 10 MG TABLET ezetimibe (ZETIA) 10 mg tablet       Take 1 tablet (10 mg total) by mouth once daily.    Take 1 tablet (10 mg total) by mouth once daily. ((Due labs & visit)) call office    LISINOPRIL (PRINIVIL,ZESTRIL) 20 MG TABLET lisinopriL (PRINIVIL,ZESTRIL) 20 MG tablet       Take 1 tablet (20 mg total) by mouth once daily.    Take 1 tablet (20 mg total) by mouth once daily. ((Requires visit for refills))

## 2023-02-07 NOTE — PATIENT INSTRUCTIONS
Counseling and Referral of Other Preventative  (Italic type indicates deductible and co-insurance are waived)    No orders of the defined types were placed in this encounter.     The following information is provided to all patients.  This information is to help you find resources for any of the problems found today that may be affecting your health:                Living healthy guide: www.[facility].[place].[facility]       Understanding Diabetes: www.diabetes.org       Eating healthy: www.cdc.gov/healthyweight      CDC home safety checklist: www.cdc.gov/steadi/patient.html      Agency on Aging: www.goea.[place].[facility]       Alcoholics anonymous (AA): www.aa.org      Physical Activity: www.aleah.nih.gov/rk6ochg       Tobacco use: www.quitwithusla.org

## 2023-02-10 ENCOUNTER — PATIENT MESSAGE (OUTPATIENT)
Dept: FAMILY MEDICINE | Facility: CLINIC | Age: 83
End: 2023-02-10
Payer: MEDICARE

## 2023-02-13 ENCOUNTER — LAB VISIT (OUTPATIENT)
Dept: LAB | Facility: HOSPITAL | Age: 83
End: 2023-02-13
Attending: INTERNAL MEDICINE
Payer: MEDICARE

## 2023-02-13 DIAGNOSIS — E21.4 PARATHYROID DYSFUNCTION: ICD-10-CM

## 2023-02-13 DIAGNOSIS — R79.89 ABNORMAL CBC: ICD-10-CM

## 2023-02-13 DIAGNOSIS — Z01.89 ENCOUNTER FOR ROUTINE LABORATORY TESTING: ICD-10-CM

## 2023-02-13 DIAGNOSIS — E04.2 MULTIPLE THYROID NODULES: ICD-10-CM

## 2023-02-13 DIAGNOSIS — E78.5 HYPERLIPIDEMIA, UNSPECIFIED HYPERLIPIDEMIA TYPE: ICD-10-CM

## 2023-02-27 ENCOUNTER — EXTERNAL HOME HEALTH (OUTPATIENT)
Dept: HOME HEALTH SERVICES | Facility: HOSPITAL | Age: 83
End: 2023-02-27
Payer: MEDICARE

## 2023-03-07 ENCOUNTER — DOCUMENT SCAN (OUTPATIENT)
Dept: HOME HEALTH SERVICES | Facility: HOSPITAL | Age: 83
End: 2023-03-07
Payer: MEDICARE

## 2023-04-14 ENCOUNTER — OFFICE VISIT (OUTPATIENT)
Dept: PULMONOLOGY | Facility: CLINIC | Age: 83
End: 2023-04-14
Payer: MEDICARE

## 2023-04-14 ENCOUNTER — PATIENT MESSAGE (OUTPATIENT)
Dept: PULMONOLOGY | Facility: CLINIC | Age: 83
End: 2023-04-14

## 2023-04-14 VITALS — HEIGHT: 61 IN | OXYGEN SATURATION: 93 % | BODY MASS INDEX: 29.55 KG/M2 | HEART RATE: 103 BPM

## 2023-04-14 DIAGNOSIS — J40 BRONCHITIS DUE TO COVID-19 VIRUS: ICD-10-CM

## 2023-04-14 DIAGNOSIS — K02.9 DENTAL CAVITIES: ICD-10-CM

## 2023-04-14 DIAGNOSIS — J44.1 CHRONIC OBSTRUCTIVE PULMONARY DISEASE WITH ACUTE EXACERBATION: Primary | ICD-10-CM

## 2023-04-14 DIAGNOSIS — R11.0 NAUSEA: ICD-10-CM

## 2023-04-14 DIAGNOSIS — U07.1 BRONCHITIS DUE TO COVID-19 VIRUS: ICD-10-CM

## 2023-04-14 PROCEDURE — 99213 OFFICE O/P EST LOW 20 MIN: CPT | Mod: 95,,, | Performed by: NURSE PRACTITIONER

## 2023-04-14 PROCEDURE — 99213 PR OFFICE/OUTPT VISIT, EST, LEVL III, 20-29 MIN: ICD-10-PCS | Mod: 95,,, | Performed by: NURSE PRACTITIONER

## 2023-04-14 RX ORDER — CHLORHEXIDINE GLUCONATE ORAL RINSE 1.2 MG/ML
15 SOLUTION DENTAL 2 TIMES DAILY
Qty: 300 ML | Refills: 0 | Status: SHIPPED | OUTPATIENT
Start: 2023-04-14 | End: 2023-04-28

## 2023-04-14 RX ORDER — CODEINE PHOSPHATE AND GUAIFENESIN 10; 100 MG/5ML; MG/5ML
5 SOLUTION ORAL EVERY 4 HOURS PRN
Qty: 210 ML | Refills: 0 | Status: SHIPPED | OUTPATIENT
Start: 2023-04-14 | End: 2023-04-21

## 2023-04-14 RX ORDER — AMOXICILLIN AND CLAVULANATE POTASSIUM 875; 125 MG/1; MG/1
1 TABLET, FILM COATED ORAL 2 TIMES DAILY
Qty: 20 TABLET | Refills: 0 | Status: SHIPPED | OUTPATIENT
Start: 2023-04-14 | End: 2023-04-24

## 2023-04-14 RX ORDER — PREDNISONE 20 MG/1
TABLET ORAL
Qty: 18 TABLET | Refills: 0 | Status: SHIPPED | OUTPATIENT
Start: 2023-04-14 | End: 2023-09-20

## 2023-04-14 NOTE — PROGRESS NOTES
4/14/2023    Gayathri Mack  Office Note    Chief Complaint   Patient presents with    6m f/u    Cough     Dark yellow thick     Wheezing     The patient location is: home, [facility]  The chief complaint leading to consultation is: COPD exacerbatio    Visit type: audiovisual    Face to Face time with patient: 20 minutes   24 minutes of total time spent on the encounter, which includes face to face time and non-face to face time preparing to see the patient (eg, review of tests), Obtaining and/or reviewing separately obtained history, Documenting clinical information in the electronic or other health record, Independently interpreting results (not separately reported) and communicating results to the patient/family/caregiver, or Care coordination (not separately reported).         Each patient to whom he or she provides medical services by telemedicine is:  (1) informed of the relationship between the physician and patient and the respective role of any other health care provider with respect to management of the patient; and (2) notified that he or she may decline to receive medical services by telemedicine and may withdraw from such care at any time.    Notes:       HPI:   4/14/2023- complaint of recurrent cough, onset 6 days, worsening with time, worse in late evenings, wakes her from sleeping. Productive yellow mucous, associated with wheeze and worsening shortness of breath, no chest tightness, tried over the counter mucinex and tylenol with no benefit.   Took prednisone 10 mg with moderate improvement. Using albuterol every 4 hours.     1/18/23- covid 19 positive started on Jan 4 at home test, complaint of severe cough, productive thick green mucous, associated with nausea and fatigue. On mucinex and nebulizer therapy daily.      The chief compliant  problem is worsening    Review of Systems:  a review of eleven systems covering constitutional, Eye, HEENT, Psych, Respiratory, Cardiac, GI, , Musculoskeletal,  "Endocrine, Dermatologic was negative except for pertinent findings as listed ABOVE and below: pertinent positive as above, rest is good  Shortness of breath    Cough  fatigue     Exam:Comprehensive exam done. Pulse 103   Ht 5' 1" (1.549 m)   SpO2 (!) 93% Comment: on room air at rest  BMI 29.55 kg/m²    Constitutional:  oriented to person, place, and time.  appears well-developed. No distress.   Nose: Nose normal.     Eyes: Pupils are equal, round,   Neck: No JVD present. No thyromegaly visible     Pulmonary/Chest: Effort normal and audible breath sounds normal. No accessory muscle usage or stridor. No apnea and no tachypnea. No respiratory distress,     Skin: tone/color normal. No palor    Psychiatric: normal mood and affect. behavior is normal. Judgment and thought content normal.         Plan:  Clinical impression is apparently straight forward and impression with management as below.    Gayathri was seen today for 6m f/u, cough and wheezing.    Diagnoses and all orders for this visit:    Chronic obstructive pulmonary disease with acute exacerbation  -     guaiFENesin-codeine 100-10 mg/5 ml (TUSSI-ORGANIDIN NR)  mg/5 mL syrup; Take 5 mLs by mouth every 4 (four) hours as needed for Cough.  -     amoxicillin-clavulanate 875-125mg (AUGMENTIN) 875-125 mg per tablet; Take 1 tablet by mouth 2 (two) times daily. for 10 days  -     predniSONE (DELTASONE) 20 MG tablet; 2 pills for 3 days, 1 pill for 3 days, repeat for cough  -     chlorhexidine (PERIDEX) 0.12 % solution; Use as directed 15 mLs in the mouth or throat 2 (two) times daily. for 14 days    Dental cavities  -     chlorhexidine (PERIDEX) 0.12 % solution; Use as directed 15 mLs in the mouth or throat 2 (two) times daily. for 14 days        Follow up in about 6 months (around 10/14/2023), or if symptoms worsen or fail to improve.    Discussed with patient above for education the following:      Patient Instructions   Asthma Action plan    Antibiotics for " yellow or green mucous    Prednisone Taper, repeat for shortness of breath or wheeze    Albuterol Inhaler 1-2 puffs every 4 hours, for cough or shortness of breath    Continue current COPD medication regiment    Use codeine cough syrup sparingly, this will decrease cough and allow you to rest at night, do not take with other pain or sleeping medications.     Use mouth rinse for cavities until able to see dentist, dental issues worsen COPD symptoms.

## 2023-04-14 NOTE — PATIENT INSTRUCTIONS
Asthma Action plan    Antibiotics for yellow or green mucous    Prednisone Taper, repeat for shortness of breath or wheeze    Albuterol Inhaler 1-2 puffs every 4 hours, for cough or shortness of breath    Continue current COPD medication regiment    Use codeine cough syrup sparingly, this will decrease cough and allow you to rest at night, do not take with other pain or sleeping medications.     Use mouth rinse for cavities until able to see dentist, dental issues worsen COPD symptoms.

## 2023-04-18 RX ORDER — ONDANSETRON 4 MG/1
4 TABLET, FILM COATED ORAL EVERY 8 HOURS PRN
Qty: 30 TABLET | Refills: 0 | Status: SHIPPED | OUTPATIENT
Start: 2023-04-18 | End: 2023-04-28

## 2023-04-19 ENCOUNTER — LAB VISIT (OUTPATIENT)
Dept: LAB | Facility: HOSPITAL | Age: 83
End: 2023-04-19
Payer: MEDICARE

## 2023-04-19 DIAGNOSIS — E21.3 HYPERPARATHYROIDISM, UNSPECIFIED: Primary | ICD-10-CM

## 2023-04-19 DIAGNOSIS — I10 ESSENTIAL HYPERTENSION, MALIGNANT: ICD-10-CM

## 2023-04-19 DIAGNOSIS — G93.40 ENCEPHALOPATHY, UNSPECIFIED: ICD-10-CM

## 2023-04-19 LAB
CA-I BLDV-SCNC: 1.35 MMOL/L (ref 1.06–1.42)
T4 FREE SERPL-MCNC: 1.15 NG/DL (ref 0.71–1.51)
TSH SERPL DL<=0.005 MIU/L-ACNC: 0.75 UIU/ML (ref 0.4–4)

## 2023-04-19 PROCEDURE — 82330 ASSAY OF CALCIUM: CPT

## 2023-04-19 PROCEDURE — 84439 ASSAY OF FREE THYROXINE: CPT

## 2023-04-19 PROCEDURE — 84443 ASSAY THYROID STIM HORMONE: CPT

## 2023-04-19 PROCEDURE — 83970 ASSAY OF PARATHORMONE: CPT

## 2023-04-20 ENCOUNTER — LAB VISIT (OUTPATIENT)
Dept: LAB | Facility: HOSPITAL | Age: 83
End: 2023-04-20
Attending: PHYSICIAN ASSISTANT
Payer: MEDICARE

## 2023-04-20 DIAGNOSIS — E21.3 HYPERPARATHYROIDISM, UNSPECIFIED: Primary | ICD-10-CM

## 2023-04-20 DIAGNOSIS — I25.10 CORONARY ATHEROSCLEROSIS OF NATIVE CORONARY ARTERY: ICD-10-CM

## 2023-04-20 DIAGNOSIS — G93.40 ENCEPHALOPATHY, UNSPECIFIED: ICD-10-CM

## 2023-04-20 LAB
25(OH)D3+25(OH)D2 SERPL-MCNC: 58 NG/ML (ref 30–96)
PROT UR-MCNC: 8 MG/DL (ref 0–15)
PTH-INTACT SERPL-MCNC: 38.2 PG/ML (ref 9–77)

## 2023-04-20 PROCEDURE — 84166 PROTEIN E-PHORESIS/URINE/CSF: CPT | Mod: 26,,, | Performed by: PATHOLOGY

## 2023-04-20 PROCEDURE — 36415 COLL VENOUS BLD VENIPUNCTURE: CPT | Mod: PO | Performed by: PHYSICIAN ASSISTANT

## 2023-04-20 PROCEDURE — 84165 PROTEIN E-PHORESIS SERUM: CPT | Mod: 26,,, | Performed by: PATHOLOGY

## 2023-04-20 PROCEDURE — 86334 PATHOLOGIST INTERPRETATION IFE: ICD-10-PCS | Mod: 26,,, | Performed by: PATHOLOGY

## 2023-04-20 PROCEDURE — 86334 IMMUNOFIX E-PHORESIS SERUM: CPT | Mod: 26,,, | Performed by: PATHOLOGY

## 2023-04-20 PROCEDURE — 84166 PATHOLOGIST INTERPRETATION UPE: ICD-10-PCS | Mod: 26,,, | Performed by: PATHOLOGY

## 2023-04-20 PROCEDURE — 84165 PROTEIN E-PHORESIS SERUM: CPT | Performed by: PHYSICIAN ASSISTANT

## 2023-04-20 PROCEDURE — 84165 PATHOLOGIST INTERPRETATION SPE: ICD-10-PCS | Mod: 26,,, | Performed by: PATHOLOGY

## 2023-04-20 PROCEDURE — 84156 ASSAY OF PROTEIN URINE: CPT | Performed by: PHYSICIAN ASSISTANT

## 2023-04-20 PROCEDURE — 82306 VITAMIN D 25 HYDROXY: CPT | Performed by: PHYSICIAN ASSISTANT

## 2023-04-20 PROCEDURE — 84166 PROTEIN E-PHORESIS/URINE/CSF: CPT | Performed by: PHYSICIAN ASSISTANT

## 2023-04-20 PROCEDURE — 86334 IMMUNOFIX E-PHORESIS SERUM: CPT | Performed by: PHYSICIAN ASSISTANT

## 2023-04-21 LAB
ALBUMIN SERPL ELPH-MCNC: 3.77 G/DL (ref 3.35–5.55)
ALPHA1 GLOB SERPL ELPH-MCNC: 0.36 G/DL (ref 0.17–0.41)
ALPHA2 GLOB SERPL ELPH-MCNC: 0.62 G/DL (ref 0.43–0.99)
B-GLOBULIN SERPL ELPH-MCNC: 0.75 G/DL (ref 0.5–1.1)
CALCIT SERPL-MCNC: <5 PG/ML
GAMMA GLOB SERPL ELPH-MCNC: 0.9 G/DL (ref 0.67–1.58)
PROT SERPL-MCNC: 6.4 G/DL (ref 6–8.4)
THRYOGLOBULIN INTERPRETATION: ABNORMAL
THYROGLOB AB SERPL-ACNC: <1.8 IU/ML
THYROGLOB SERPL-MCNC: 18 NG/ML

## 2023-04-24 LAB
PATHOLOGIST INTERPRETATION SPE: NORMAL
PROT PATTERN UR ELPH-IMP: NORMAL

## 2023-04-25 ENCOUNTER — TELEPHONE (OUTPATIENT)
Dept: ENDOCRINOLOGY | Facility: CLINIC | Age: 83
End: 2023-04-25
Payer: MEDICARE

## 2023-04-25 DIAGNOSIS — E04.2 MULTIPLE THYROID NODULES: Primary | ICD-10-CM

## 2023-04-25 LAB
INTERPRETATION SERPL IFE-IMP: NORMAL
PATHOLOGIST INTERPRETATION IFE: NORMAL
PATHOLOGIST INTERPRETATION UPE: NORMAL

## 2023-04-26 NOTE — TELEPHONE ENCOUNTER
Spoke with patients daughter who reports that her mother cannot get the Thyroid Neck US done today but has been scheduled on 4/27/23 @ 945 AM for the Thyroid Neck US & her appt with MANDY Domingo PA-C has been moved to 4/27/23 @ 1100 AM.

## 2023-04-27 ENCOUNTER — HOSPITAL ENCOUNTER (OUTPATIENT)
Dept: RADIOLOGY | Facility: HOSPITAL | Age: 83
Discharge: HOME | End: 2023-04-27
Attending: PHYSICIAN ASSISTANT
Payer: MEDICARE

## 2023-04-27 ENCOUNTER — OFFICE VISIT (OUTPATIENT)
Dept: ENDOCRINOLOGY | Facility: CLINIC | Age: 83
End: 2023-04-27
Payer: MEDICARE

## 2023-04-27 VITALS
DIASTOLIC BLOOD PRESSURE: 80 MMHG | BODY MASS INDEX: 29.62 KG/M2 | HEIGHT: 61 IN | SYSTOLIC BLOOD PRESSURE: 118 MMHG | HEART RATE: 86 BPM | TEMPERATURE: 98 F | WEIGHT: 156.88 LBS | OXYGEN SATURATION: 95 %

## 2023-04-27 DIAGNOSIS — R22.31 NODULE OF SKIN OF RIGHT HAND: ICD-10-CM

## 2023-04-27 DIAGNOSIS — E55.9 HYPOVITAMINOSIS D: ICD-10-CM

## 2023-04-27 DIAGNOSIS — E21.3 HYPERPARATHYROIDISM: ICD-10-CM

## 2023-04-27 DIAGNOSIS — M81.0 OSTEOPOROSIS, UNSPECIFIED OSTEOPOROSIS TYPE, UNSPECIFIED PATHOLOGICAL FRACTURE PRESENCE: ICD-10-CM

## 2023-04-27 DIAGNOSIS — E04.2 MULTIPLE THYROID NODULES: ICD-10-CM

## 2023-04-27 DIAGNOSIS — E04.2 MULTIPLE THYROID NODULES: Primary | ICD-10-CM

## 2023-04-27 PROCEDURE — 99213 OFFICE O/P EST LOW 20 MIN: CPT | Mod: S$PBB,,, | Performed by: PHYSICIAN ASSISTANT

## 2023-04-27 PROCEDURE — 99999 PR PBB SHADOW E&M-EST. PATIENT-LVL IV: CPT | Mod: PBBFAC,,, | Performed by: PHYSICIAN ASSISTANT

## 2023-04-27 PROCEDURE — 99999 PR PBB SHADOW E&M-EST. PATIENT-LVL IV: ICD-10-PCS | Mod: PBBFAC,,, | Performed by: PHYSICIAN ASSISTANT

## 2023-04-27 PROCEDURE — 99213 PR OFFICE/OUTPT VISIT, EST, LEVL III, 20-29 MIN: ICD-10-PCS | Mod: S$PBB,,, | Performed by: PHYSICIAN ASSISTANT

## 2023-04-27 PROCEDURE — 76536 US EXAM OF HEAD AND NECK: CPT | Mod: TC,PO

## 2023-04-27 PROCEDURE — 99214 OFFICE O/P EST MOD 30 MIN: CPT | Mod: PBBFAC,PO | Performed by: PHYSICIAN ASSISTANT

## 2023-05-01 ENCOUNTER — HOSPITAL ENCOUNTER (OUTPATIENT)
Dept: RADIOLOGY | Facility: HOSPITAL | Age: 83
Discharge: HOME | End: 2023-05-01
Attending: PHYSICIAN ASSISTANT
Payer: MEDICARE

## 2023-05-01 DIAGNOSIS — R22.31 NODULE OF SKIN OF RIGHT HAND: ICD-10-CM

## 2023-05-01 PROCEDURE — 73130 X-RAY EXAM OF HAND: CPT | Mod: TC,50,PO

## 2023-05-04 DIAGNOSIS — E78.5 HYPERLIPIDEMIA, UNSPECIFIED HYPERLIPIDEMIA TYPE: ICD-10-CM

## 2023-05-04 DIAGNOSIS — I25.10 CORONARY ARTERY DISEASE INVOLVING NATIVE CORONARY ARTERY OF NATIVE HEART WITHOUT ANGINA PECTORIS: ICD-10-CM

## 2023-05-05 RX ORDER — EZETIMIBE 10 MG/1
TABLET ORAL
Qty: 90 TABLET | Refills: 3 | Status: SHIPPED | OUTPATIENT
Start: 2023-05-05

## 2023-05-05 NOTE — TELEPHONE ENCOUNTER
Refill Decision Note      Refill Decision Note   Gayathri Mack  is requesting a refill authorization.  Brief Assessment and Rationale for Refill:  Approve     Medication Therapy Plan:         Comments:     Note composed:6:23 AM 05/05/2023             Appointments     Last Visit   2/7/2023 Carlos Deal MD   Next Visit   Visit date not found Carlos Deal MD

## 2023-05-05 NOTE — TELEPHONE ENCOUNTER
No care due was identified.  [facility] Embedded Care Due Messages. Reference number: [id number].   5/04/2023 10:01:14 PM CDT

## 2023-05-28 DIAGNOSIS — M81.0 AGE RELATED OSTEOPOROSIS, UNSPECIFIED PATHOLOGICAL FRACTURE PRESENCE: ICD-10-CM

## 2023-05-29 RX ORDER — ALENDRONATE SODIUM 70 MG/1
70 TABLET ORAL
Qty: 12 TABLET | Refills: 3 | Status: SHIPPED | OUTPATIENT
Start: 2023-05-29

## 2023-05-31 ENCOUNTER — EXTERNAL HOME HEALTH (OUTPATIENT)
Dept: HOME HEALTH SERVICES | Facility: HOSPITAL | Age: 83
End: 2023-05-31
Payer: MEDICARE

## 2023-06-01 ENCOUNTER — DOCUMENT SCAN (OUTPATIENT)
Dept: HOME HEALTH SERVICES | Facility: HOSPITAL | Age: 83
End: 2023-06-01
Payer: MEDICARE

## 2023-06-19 ENCOUNTER — CLINICAL SUPPORT (OUTPATIENT)
Dept: AUDIOLOGY | Facility: CLINIC | Age: 83
End: 2023-06-19
Payer: MEDICARE

## 2023-06-19 DIAGNOSIS — H93.293 IMPAIRMENT OF AUDITORY DISCRIMINATION OF BOTH EARS: ICD-10-CM

## 2023-06-19 DIAGNOSIS — I10 ESSENTIAL HYPERTENSION: ICD-10-CM

## 2023-06-19 DIAGNOSIS — H90.3 SENSORINEURAL HEARING LOSS (SNHL), BILATERAL: Primary | ICD-10-CM

## 2023-06-19 PROCEDURE — 92604 REPROGRAM COCHLEAR IMPLT 7/>: CPT | Mod: PBBFAC | Performed by: AUDIOLOGIST

## 2023-06-19 NOTE — TELEPHONE ENCOUNTER
Refill Routing Note   Medication(s) are not appropriate for processing by [facility] for the following reason(s):      Required labs abnormal    [facility] action(s):  Defer None identified          Extended chart review required: Yes     Appointments  past 12m or future 3m with PCP    Date Provider   Last Visit   2/7/2023 Carlos Deal MD   Next Visit   Visit date not found Carlos Deal MD   ED visits in past 90 days: 0        Note composed:2:02 PM 06/19/2023

## 2023-06-19 NOTE — TELEPHONE ENCOUNTER
Refill Routing Note   Medication(s) are not appropriate for processing by [facility] for the following reason(s):      Required labs abnormal    [facility] action(s):  Defer None identified          Extended chart review required: Yes     Appointments  past 12m or future 3m with PCP    Date Provider   Last Visit   2/7/2023 Carlos Deal MD   Next Visit   Visit date not found Carlos Deal MD   ED visits in past 90 days: 0        Note composed:2:03 PM 06/19/2023

## 2023-06-19 NOTE — TELEPHONE ENCOUNTER
No care due was identified.  [facility] Embedded Care Due Messages. Reference number: [id number].   6/19/2023 7:20:01 AM CDT

## 2023-06-19 NOTE — TELEPHONE ENCOUNTER
Refill Routing Note   Medication(s) are not appropriate for processing by [facility] for the following reason(s):      Required labs abnormal    [facility] action(s):  Defer None identified        Pharmacist review requested: Yes     Appointments  past 12m or future 3m with PCP    Date Provider   Last Visit   2/7/2023 Carlos Deal MD   Next Visit   Visit date not found Carlos Deal MD   ED visits in past 90 days: 0        Note composed:7:55 AM 06/19/2023

## 2023-06-20 NOTE — PROGRESS NOTES
6/19/2023    Cochlear Implant Programming Session:    LEFT EAR: Dr. Carson Mueller ([place], [place])  : MED[employer]   Internal device/serial number: Sonata implant (SN: [id number])  Processor: HUSEYIN 3  DOS: 02/25/2008  DOI:  12/13/2022  Magnet strength: 1  Too tight - recommended reduced strength or pad for comfort  Battery type: RECHARGEABLE     AUDIOLINK  FINE TUNER     Ms. Gayathri Mack was seen today for a cochlear implant programming session to reprogram her external equipment.  Her daughter Beatrice that lives with her does not feel she is hearing well.  Ms. Mack's other daughter that lives out of town tried to assist her with the set up of her ECHO fine tuner and her AudioLink.  There was some concern that she was wearing the implant sound processor upside down.  Ms. Mack had the processor on correctly today.  Ms. Mack reported the battery in the Echo fine tuner was only lasting one day.  They had bought a back of batteries and tried two that only lasted for 1-2 days.  She reported static in the Audiolink and it would not stay connected to her cell phone.      The battery compartment of the Echo fine tuner was locked shut and could not be opened today.  The Fine tuner was working today but observed it intermittent.  There may be a loose connection causing it to show it not making a connection.  The AudioLink was not working properly.  It would not pair to the processor and would not turn off.  The battery was removed but it still remained in the on position.  It would not longer connect to the phone.    The equipment will be replaced under warranty.  AudioLink # [id number] and Fine Tuner  # [id number].  The replacement equipment will be mailed directly to the house and the family will set up a 1:1 appointment for programming assistance.      The physical examination of her implant revealed the magnet was too tight.  Ms. Mack reported she had headaches at times and removes the processor for relief.  The magnet appears to  be too tight.  A lower strength magnet or pad was recommended for comfort.  Impedance testing was completed for the left ear.  The processor was re-mapped today.  3 progressive maps were created and loaded into the processor.  Ms. Mack was counseled on the use of the programs and the Fine tuner.    Ms. Mack stated she had a history of progressive SNHL bilaterally and she had never worn a hearing aid in either ear.  She was implanted in [place] approximately 5 years ago in her [place].  Her daughter was concerned that it was possible the hearing in her right ear had worsened.  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  Aided testing was completed with her cochlear implant sound processor.   Aided responses were obtained at 35-40dBHL in sound field with 68% speech discrimination score in quiet.      Information on [employer] [facility] and additional resources were provided today.      Recommend:  Replacement equipment from [employer] for her cochlear implant  Follow up assistance from [employer] on the accessories for her cochlear implant.  Use of clear speech for daily communication.  Annual evaluation.

## 2023-06-22 RX ORDER — LISINOPRIL 20 MG/1
TABLET ORAL
Qty: 90 TABLET | Refills: 2 | OUTPATIENT
Start: 2023-06-22

## 2023-06-23 RX ORDER — LISINOPRIL 20 MG/1
20 TABLET ORAL DAILY
Qty: 90 TABLET | Refills: 0 | Status: SHIPPED | OUTPATIENT
Start: 2023-06-23 | End: 2023-09-20

## 2023-07-03 ENCOUNTER — LAB VISIT (OUTPATIENT)
Dept: LAB | Facility: HOSPITAL | Age: 83
End: 2023-07-03
Attending: INTERNAL MEDICINE
Payer: MEDICARE

## 2023-07-03 DIAGNOSIS — E04.2 NONTOXIC MULTINODULAR GOITER: ICD-10-CM

## 2023-07-03 DIAGNOSIS — E21.3 HYPERPARATHYROIDISM, UNSPECIFIED: Primary | ICD-10-CM

## 2023-07-03 LAB
ALBUMIN SERPL BCP-MCNC: 3.8 G/DL (ref 3.5–5.2)
ALP SERPL-CCNC: 61 U/L (ref 55–135)
ALT SERPL W/O P-5'-P-CCNC: 25 U/L (ref 10–44)
ANION GAP SERPL CALC-SCNC: 10 MMOL/L (ref 8–16)
AST SERPL-CCNC: 23 U/L (ref 10–40)
BASOPHILS # BLD AUTO: 0.07 K/UL (ref 0–0.2)
BASOPHILS NFR BLD: 0.8 % (ref 0–1.9)
BILIRUB SERPL-MCNC: 0.6 MG/DL (ref 0.1–1)
BUN SERPL-MCNC: 22 MG/DL (ref 8–23)
CALCIUM SERPL-MCNC: 9 MG/DL (ref 8.7–10.5)
CHLORIDE SERPL-SCNC: 106 MMOL/L (ref 95–110)
CHOLEST SERPL-MCNC: 154 MG/DL (ref 120–199)
CHOLEST/HDLC SERPL: 2.9 {RATIO} (ref 2–5)
CO2 SERPL-SCNC: 24 MMOL/L (ref 23–29)
CREAT SERPL-MCNC: 0.7 MG/DL (ref 0.5–1.4)
DIFFERENTIAL METHOD: ABNORMAL
EOSINOPHIL # BLD AUTO: 0.2 K/UL (ref 0–0.5)
EOSINOPHIL NFR BLD: 1.9 % (ref 0–8)
ERYTHROCYTE [DISTWIDTH] IN BLOOD BY AUTOMATED COUNT: 13.3 % (ref 11.5–14.5)
EST. GFR  (NO RACE VARIABLE): >60 ML/MIN/1.73 M^2
ESTIMATED AVG GLUCOSE: 123 MG/DL (ref 68–131)
GLUCOSE SERPL-MCNC: 87 MG/DL (ref 70–110)
HBA1C MFR BLD: 5.9 % (ref 4.5–6.2)
HCT VFR BLD AUTO: 43.6 % (ref 37–48.5)
HDLC SERPL-MCNC: 53 MG/DL (ref 40–75)
HDLC SERPL: 34.4 % (ref 20–50)
HGB BLD-MCNC: 13.9 G/DL (ref 12–16)
IMM GRANULOCYTES # BLD AUTO: 0.03 K/UL (ref 0–0.04)
IMM GRANULOCYTES NFR BLD AUTO: 0.3 % (ref 0–0.5)
LDLC SERPL CALC-MCNC: 84.4 MG/DL (ref 63–159)
LYMPHOCYTES # BLD AUTO: 2 K/UL (ref 1–4.8)
LYMPHOCYTES NFR BLD: 22.7 % (ref 18–48)
MCH RBC QN AUTO: 28.4 PG (ref 27–31)
MCHC RBC AUTO-ENTMCNC: 31.9 G/DL (ref 32–36)
MCV RBC AUTO: 89 FL (ref 82–98)
MONOCYTES # BLD AUTO: 0.7 K/UL (ref 0.3–1)
MONOCYTES NFR BLD: 7.7 % (ref 4–15)
NEUTROPHILS # BLD AUTO: 6 K/UL (ref 1.8–7.7)
NEUTROPHILS NFR BLD: 66.6 % (ref 38–73)
NONHDLC SERPL-MCNC: 101 MG/DL
NRBC BLD-RTO: 0 /100 WBC
PLATELET # BLD AUTO: 364 K/UL (ref 150–450)
PMV BLD AUTO: 11 FL (ref 9.2–12.9)
POTASSIUM SERPL-SCNC: 4.4 MMOL/L (ref 3.5–5.1)
POTASSIUM SERPL-SCNC: 4.4 MMOL/L (ref 3.5–5.1)
PROT SERPL-MCNC: 6.5 G/DL (ref 6–8.4)
RBC # BLD AUTO: 4.9 M/UL (ref 4–5.4)
SODIUM SERPL-SCNC: 140 MMOL/L (ref 136–145)
TRIGL SERPL-MCNC: 83 MG/DL (ref 30–150)
TSH SERPL DL<=0.005 MIU/L-ACNC: 0.89 UIU/ML (ref 0.4–4)
WBC # BLD AUTO: 8.94 K/UL (ref 3.9–12.7)

## 2023-07-03 PROCEDURE — 36415 COLL VENOUS BLD VENIPUNCTURE: CPT | Performed by: INTERNAL MEDICINE

## 2023-07-03 PROCEDURE — 85025 COMPLETE CBC W/AUTO DIFF WBC: CPT | Performed by: INTERNAL MEDICINE

## 2023-07-03 PROCEDURE — 80053 COMPREHEN METABOLIC PANEL: CPT | Performed by: INTERNAL MEDICINE

## 2023-07-03 PROCEDURE — 80061 LIPID PANEL: CPT | Performed by: INTERNAL MEDICINE

## 2023-07-03 PROCEDURE — 84443 ASSAY THYROID STIM HORMONE: CPT | Performed by: INTERNAL MEDICINE

## 2023-07-03 PROCEDURE — 83036 HEMOGLOBIN GLYCOSYLATED A1C: CPT | Performed by: INTERNAL MEDICINE

## 2023-07-05 PROCEDURE — G0179 PR HOME HEALTH MD RECERTIFICATION: ICD-10-PCS | Mod: ,,, | Performed by: INTERNAL MEDICINE

## 2023-07-05 PROCEDURE — G0179 MD RECERTIFICATION HHA PT: HCPCS | Mod: ,,, | Performed by: INTERNAL MEDICINE

## 2023-07-12 ENCOUNTER — EXTERNAL HOME HEALTH (OUTPATIENT)
Dept: HOME HEALTH SERVICES | Facility: HOSPITAL | Age: 83
End: 2023-07-12
Payer: MEDICARE

## 2023-09-03 PROCEDURE — G0179 MD RECERTIFICATION HHA PT: HCPCS | Mod: ,,, | Performed by: INTERNAL MEDICINE

## 2023-09-03 PROCEDURE — G0179 PR HOME HEALTH MD RECERTIFICATION: ICD-10-PCS | Mod: ,,, | Performed by: INTERNAL MEDICINE

## 2023-09-08 DIAGNOSIS — J44.89 COPD WITH ASTHMA: ICD-10-CM

## 2023-09-08 RX ORDER — FLUTICASONE FUROATE, UMECLIDINIUM BROMIDE AND VILANTEROL TRIFENATATE 200; 62.5; 25 UG/1; UG/1; UG/1
1 POWDER RESPIRATORY (INHALATION) DAILY
Qty: 180 EACH | Refills: 1 | Status: SHIPPED | OUTPATIENT
Start: 2023-09-08 | End: 2023-10-24 | Stop reason: SDUPTHER

## 2023-09-13 ENCOUNTER — EXTERNAL HOME HEALTH (OUTPATIENT)
Dept: HOME HEALTH SERVICES | Facility: HOSPITAL | Age: 83
End: 2023-09-13
Payer: MEDICARE

## 2023-09-18 ENCOUNTER — TELEPHONE (OUTPATIENT)
Dept: FAMILY MEDICINE | Facility: CLINIC | Age: 83
End: 2023-09-18
Payer: MEDICARE

## 2023-09-18 NOTE — TELEPHONE ENCOUNTER
----- Message from Pankaj Henderson sent at 9/18/2023  1:00 PM CDT -----  Regarding: appointment  Contact: pietro  Type:  Sooner Apoointment Request    Caller is requesting a sooner appointment.  Caller declined first available appointment listed below.  Caller will not accept being placed on the waitlist and is requesting a message be sent to doctor.  Name of Caller:patient  When is the first available appointment?office nurse  Symptoms:problem/concern HR not regular  Would the patient rather a call back or a response via [facility]? Please call to advise/schedule  Best Call Back Number:[phone]  Additional Information:

## 2023-09-18 NOTE — TELEPHONE ENCOUNTER
Spoke with patient and she states she has been having high blood pressure (452522/104) x 3 weeks.     Denies SOB, Chest pain, and dizziness.    Apt scheduled with Dr. Deal 9/20 @ 1045 am    Pt verbalized understanding

## 2023-09-20 ENCOUNTER — OFFICE VISIT (OUTPATIENT)
Dept: FAMILY MEDICINE | Facility: CLINIC | Age: 83
End: 2023-09-20
Payer: MEDICARE

## 2023-09-20 VITALS
OXYGEN SATURATION: 98 % | DIASTOLIC BLOOD PRESSURE: 82 MMHG | SYSTOLIC BLOOD PRESSURE: 152 MMHG | RESPIRATION RATE: 16 BRPM | HEIGHT: 61 IN | HEART RATE: 68 BPM | WEIGHT: 153.25 LBS | BODY MASS INDEX: 28.93 KG/M2

## 2023-09-20 DIAGNOSIS — R45.82 WORRIES: Primary | ICD-10-CM

## 2023-09-20 DIAGNOSIS — I10 ESSENTIAL HYPERTENSION: ICD-10-CM

## 2023-09-20 DIAGNOSIS — M72.0 DUPUYTREN'S CONTRACTURE OF RIGHT HAND: ICD-10-CM

## 2023-09-20 DIAGNOSIS — F41.1 GAD (GENERALIZED ANXIETY DISORDER): ICD-10-CM

## 2023-09-20 PROCEDURE — 99214 OFFICE O/P EST MOD 30 MIN: CPT | Mod: S$PBB,,, | Performed by: INTERNAL MEDICINE

## 2023-09-20 PROCEDURE — 99999 PR PBB SHADOW E&M-EST. PATIENT-LVL IV: ICD-10-PCS | Mod: PBBFAC,,, | Performed by: INTERNAL MEDICINE

## 2023-09-20 PROCEDURE — 99214 OFFICE O/P EST MOD 30 MIN: CPT | Mod: PBBFAC,PN | Performed by: INTERNAL MEDICINE

## 2023-09-20 PROCEDURE — 99999 PR PBB SHADOW E&M-EST. PATIENT-LVL IV: CPT | Mod: PBBFAC,,, | Performed by: INTERNAL MEDICINE

## 2023-09-20 PROCEDURE — 99214 PR OFFICE/OUTPT VISIT, EST, LEVL IV, 30-39 MIN: ICD-10-PCS | Mod: S$PBB,,, | Performed by: INTERNAL MEDICINE

## 2023-09-20 RX ORDER — METOPROLOL SUCCINATE 25 MG/1
25 TABLET, EXTENDED RELEASE ORAL
COMMUNITY
Start: 2023-07-14 | End: 2023-10-24 | Stop reason: SDUPTHER

## 2023-09-20 RX ORDER — ESCITALOPRAM OXALATE 5 MG/1
5 TABLET ORAL DAILY
Qty: 30 TABLET | Refills: 1 | Status: SHIPPED | OUTPATIENT
Start: 2023-09-20 | End: 2023-10-24 | Stop reason: SDUPTHER

## 2023-09-20 RX ORDER — VALSARTAN 160 MG/1
160 TABLET ORAL 2 TIMES DAILY
Qty: 180 TABLET | Refills: 2 | Status: SHIPPED | OUTPATIENT
Start: 2023-09-20

## 2023-09-20 RX ORDER — VALSARTAN 160 MG/1
160 TABLET ORAL
COMMUNITY
Start: 2023-09-13 | End: 2023-09-20 | Stop reason: SDUPTHER

## 2023-09-20 NOTE — PROGRESS NOTES
Subjective:       Patient ID: Gayathri Mack is a 83 y.o. female.    Chief Complaint: Hypertension (Pt complains she has been having high blood pressure (859919/104) x 3 weeks. /Denies SOB, Chest pain, and dizziness.)   Here due to elevated BP and anxiety     Hypertension  The current episode started 1 to 4 weeks ago. Associated symptoms include headaches. Pertinent negatives include no chest pain.       Gyn: no defers  MMG:  No defers  Dexa: 2020 get old records//[place] // 2022 Osteoporosis Fosamax -plan this yr is Prolia   Colonoscopy:  never   Immunizations: Flu:  Yes Tdap: Get old record Pneumovax: 2018 Prevnar 13: 2014 Shingles: Y Covid:Y  Smoker: former - quit 2020      Follow up     Hypertension:  uncontrolled Rx Lisinopril 20   Cardio Dr Everett     Anxiety:  more nervous. Worries about $    Right hand nodule present while and occ trigger finger. Middle finger right     Glucose intolerance:  stable A1c 5.9, G 87      Thyroid nodule: s/p biospy. 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 2022: CT + right apex nodule.      Cochlear implant lift -- hearing loss since child      Alopecia wears a wig     ____________________________________________________________________________________________________  Assessment & Plan:  1. Worries  - EScitalopram oxalate (LEXAPRO) 5 MG Tab; Take 1 tablet (5 mg total) by mouth once daily. anxiety  Dispense: 30 tablet; Refill: 1    2. MARU (generalized anxiety disorder)  - EScitalopram oxalate (LEXAPRO) 5 MG Tab; Take 1 tablet (5 mg total) by mouth once daily. anxiety  Dispense: 30 tablet; Refill: 1    3. Essential hypertension  - valsartan (DIOVAN) 160 MG tablet; Take 1 tablet (160 mg total) by mouth 2 (two)  times daily. Blood pressure  Dispense: 180 tablet; Refill: 2    4. Dupuytren's contracture of right hand     Worries  -     EScitalopram oxalate (LEXAPRO) 5 MG Tab; Take 1 tablet (5 mg total) by mouth once daily. anxiety  Dispense: 30 tablet; Refill: 1    MARU (generalized anxiety disorder)  -     EScitalopram oxalate (LEXAPRO) 5 MG Tab; Take 1 tablet (5 mg total) by mouth once daily. anxiety  Dispense: 30 tablet; Refill: 1    Essential hypertension  -     valsartan (DIOVAN) 160 MG tablet; Take 1 tablet (160 mg total) by mouth 2 (two) times daily. Blood pressure  Dispense: 180 tablet; Refill: 2    Dupuytren's contracture of right hand        Continue to work on regular exercise, maintain healthy weight, balanced diet. Avoid unhealthy habits: smoking, excessive alcohol intake.     Disclaimer: This note was partly generated using dictation software which may occasionally result in transcription errors  ____________________________________________________________________________________________________  Review of Systems:  Review of Systems   Constitutional:  Negative for appetite change.   HENT:  Negative for nosebleeds.    Eyes:  Negative for pain.   Respiratory:  Negative for choking.    Cardiovascular:  Negative for chest pain.   Gastrointestinal:  Negative for blood in stool.   Genitourinary:  Negative for hematuria.   Musculoskeletal:  Negative for joint swelling.   Skin:  Negative for pallor.   Neurological:  Positive for headaches. Negative for facial asymmetry.   Hematological:  Does not bruise/bleed easily.   Psychiatric/Behavioral:  Negative for confusion. The patient is nervous/anxious.        Objective:     Wt Readings from Last 3 Encounters:   09/20/23 69.5 kg (153 lb 3.5 oz)   04/27/23 71.2 kg (156 lb 13.7 oz)   02/07/23 70.9 kg (156 lb 6.7 oz)     BP Readings from Last 3 Encounters:   09/20/23 (!) 152/82   04/27/23 118/80   02/07/23 122/78       Lab Results   Component Value Date    WBC 8.94 07/03/2023     HGB 13.9 07/03/2023    HCT 43.6 07/03/2023     07/03/2023     07/03/2023    K 4.4 07/03/2023    K 4.4 07/03/2023     07/03/2023    ALT 25 07/03/2023    AST 23 07/03/2023    CO2 24 07/03/2023    CREATININE 0.7 07/03/2023    BUN 22 07/03/2023    GLU 87 07/03/2023      Hemoglobin A1C   Date Value Ref Range Status   07/03/2023 5.9 4.5 - 6.2 % Final     Comment:     According to ADA guidelines, hemoglobin A1C <7.0% represents  optimal control in non-pregnant diabetic patients.  Different  metrics may apply to specific populations.   Standards of Medical Care in Diabetes - 2016.    For the purpose of screening for the presence of diabetes:  <5.7%     Consistent with the absence of diabetes  5.7-6.4%  Consistent with increasing risk for diabetes   (prediabetes)  >or=6.5%  Consistent with diabetes    Currently no consensus exists for use of hemoglobin A1C  for diagnosis of diabetes for children.     02/13/2023 5.7 (H) 4.0 - 5.6 % Final     Comment:     ADA Screening Guidelines:  5.7-6.4%  Consistent with prediabetes  >or=6.5%  Consistent with diabetes    High levels of fetal hemoglobin interfere with the HbA1C  assay. Heterozygous hemoglobin variants (HbS, HgC, etc)do  not significantly interfere with this assay.   However, presence of multiple variants may affect accuracy.        Lab Results   Component Value Date    TSH 0.893 07/03/2023    TSH 0.747 04/19/2023    TSH 1.347 02/13/2023     Lab Results   Component Value Date    FREET4 1.15 04/19/2023    FREET4 1.30 02/13/2023    FREET4 0.90 10/12/2022     Lab Results   Component Value Date    LDLCALC 84.4 07/03/2023    LDLCALC 76.0 02/13/2023    LDLCALC 169.6 (H) 01/26/2022     Lab Results   Component Value Date    TRIG 83 07/03/2023    TRIG 95 02/13/2023    TRIG 107 01/26/2022            Physical Exam  Constitutional:       Appearance: Normal appearance.      Comments: Here w daughter    HENT:      Head: Normocephalic and atraumatic.   Eyes:       Extraocular Movements: Extraocular movements intact.      Pupils: Pupils are equal, round, and reactive to light.   Cardiovascular:      Rate and Rhythm: Normal rate and regular rhythm.   Pulmonary:      Effort: Pulmonary effort is normal.      Breath sounds: Normal breath sounds.   Neurological:      Mental Status: She is alert and oriented to person, place, and time.   Psychiatric:         Mood and Affect: Mood normal.         Medication List with Changes/Refills   New Medications    ESCITALOPRAM OXALATE (LEXAPRO) 5 MG TAB    Take 1 tablet (5 mg total) by mouth once daily. anxiety   Current Medications    ALBUTEROL (VENTOLIN HFA) 90 MCG/ACTUATION INHALER    Inhale 2 puffs into the lungs every 4 (four) hours as needed for Wheezing or Shortness of Breath. Rescue    ALBUTEROL-IPRATROPIUM (DUO-NEB) 2.5 MG-0.5 MG/3 ML NEBULIZER SOLUTION    Take 3 mLs by nebulization every 6 (six) hours as needed for Wheezing. Rescue    ALENDRONATE (FOSAMAX) 70 MG TABLET    Take 1 tablet (70 mg total) by mouth every 7 days.    AZELASTINE (ASTELIN) 137 MCG (0.1 %) NASAL SPRAY    1 spray (137 mcg total) by Nasal route 2 (two) times daily.    EZETIMIBE (ZETIA) 10 MG TABLET    TAKE 1 TABLET BY MOUTH ONCE  DAILY    FLUTICASONE-UMECLIDIN-VILANTER (TRELEGY ELLIPTA) 200-62.5-25 MCG INHALER    Inhale 1 puff into the lungs once daily.    METOPROLOL SUCCINATE (TOPROL-XL) 25 MG 24 HR TABLET    Take 25 mg by mouth.    MONTELUKAST (SINGULAIR) 10 MG TABLET    Take 1 tablet (10 mg total) by mouth once daily.    ROSUVASTATIN (CRESTOR) 10 MG TABLET    Take 5 mg by mouth once daily.   Changed and/or Refilled Medications    Modified Medication Previous Medication    VALSARTAN (DIOVAN) 160 MG TABLET valsartan (DIOVAN) 160 MG tablet       Take 1 tablet (160 mg total) by mouth 2 (two) times daily. Blood pressure    Take 160 mg by mouth.   Discontinued Medications    ASPIRIN (ECOTRIN) 81 MG EC TABLET    Take 1 tablet (81 mg total) by mouth once daily.     LISINOPRIL (PRINIVIL,ZESTRIL) 20 MG TABLET    Take 1 tablet (20 mg total) by mouth once daily.    MELATONIN 300 MCG TAB    Take 300 mcg by mouth nightly as needed (insomnia).    PREDNISONE (DELTASONE) 20 MG TABLET    2 pills for 3 days, 1 pill for 3 days, repeat for cough

## 2023-10-24 ENCOUNTER — OFFICE VISIT (OUTPATIENT)
Dept: FAMILY MEDICINE | Facility: CLINIC | Age: 83
End: 2023-10-24
Payer: MEDICARE

## 2023-10-24 VITALS
DIASTOLIC BLOOD PRESSURE: 86 MMHG | WEIGHT: 153.75 LBS | HEART RATE: 74 BPM | OXYGEN SATURATION: 97 % | SYSTOLIC BLOOD PRESSURE: 138 MMHG | RESPIRATION RATE: 16 BRPM | BODY MASS INDEX: 29.03 KG/M2 | HEIGHT: 61 IN

## 2023-10-24 DIAGNOSIS — M81.0 AGE RELATED OSTEOPOROSIS, UNSPECIFIED PATHOLOGICAL FRACTURE PRESENCE: ICD-10-CM

## 2023-10-24 DIAGNOSIS — F41.1 GAD (GENERALIZED ANXIETY DISORDER): Primary | ICD-10-CM

## 2023-10-24 DIAGNOSIS — J44.9 CHRONIC OBSTRUCTIVE PULMONARY DISEASE, UNSPECIFIED COPD TYPE: ICD-10-CM

## 2023-10-24 DIAGNOSIS — K21.9 GASTROESOPHAGEAL REFLUX DISEASE WITHOUT ESOPHAGITIS: ICD-10-CM

## 2023-10-24 DIAGNOSIS — Z78.0 MENOPAUSE: ICD-10-CM

## 2023-10-24 DIAGNOSIS — I10 ESSENTIAL HYPERTENSION: ICD-10-CM

## 2023-10-24 DIAGNOSIS — J44.89 COPD WITH ASTHMA: ICD-10-CM

## 2023-10-24 DIAGNOSIS — E78.2 MIXED HYPERLIPIDEMIA: ICD-10-CM

## 2023-10-24 DIAGNOSIS — R45.82 WORRIES: ICD-10-CM

## 2023-10-24 PROCEDURE — 99999PBSHW FLU VACCINE - QUADRIVALENT - ADJUVANTED: ICD-10-PCS | Mod: PBBFAC,,,

## 2023-10-24 PROCEDURE — 99999 PR PBB SHADOW E&M-EST. PATIENT-LVL III: CPT | Mod: PBBFAC,,, | Performed by: INTERNAL MEDICINE

## 2023-10-24 PROCEDURE — 99213 OFFICE O/P EST LOW 20 MIN: CPT | Mod: PBBFAC,PN,25 | Performed by: INTERNAL MEDICINE

## 2023-10-24 PROCEDURE — 99214 PR OFFICE/OUTPT VISIT, EST, LEVL IV, 30-39 MIN: ICD-10-PCS | Mod: S$PBB,,, | Performed by: INTERNAL MEDICINE

## 2023-10-24 PROCEDURE — 99999 PR PBB SHADOW E&M-EST. PATIENT-LVL III: ICD-10-PCS | Mod: PBBFAC,,, | Performed by: INTERNAL MEDICINE

## 2023-10-24 PROCEDURE — G0008 ADMIN INFLUENZA VIRUS VAC: HCPCS | Mod: PBBFAC,PN

## 2023-10-24 PROCEDURE — 99214 OFFICE O/P EST MOD 30 MIN: CPT | Mod: S$PBB,,, | Performed by: INTERNAL MEDICINE

## 2023-10-24 PROCEDURE — 99999PBSHW FLU VACCINE - QUADRIVALENT - ADJUVANTED: Mod: PBBFAC,,,

## 2023-10-24 RX ORDER — ESCITALOPRAM OXALATE 10 MG/1
10 TABLET ORAL DAILY
Qty: 90 TABLET | Refills: 3 | Status: SHIPPED | OUTPATIENT
Start: 2023-10-24 | End: 2024-10-23

## 2023-10-24 RX ORDER — FLUTICASONE FUROATE, UMECLIDINIUM BROMIDE AND VILANTEROL TRIFENATATE 200; 62.5; 25 UG/1; UG/1; UG/1
1 POWDER RESPIRATORY (INHALATION) DAILY
Qty: 60 EACH | Refills: 6 | Status: SHIPPED | OUTPATIENT
Start: 2023-10-24 | End: 2024-03-14

## 2023-10-24 RX ORDER — MONTELUKAST SODIUM 10 MG/1
10 TABLET ORAL DAILY
Qty: 90 TABLET | Refills: 3 | Status: SHIPPED | OUTPATIENT
Start: 2023-10-24

## 2023-10-24 RX ORDER — ROSUVASTATIN CALCIUM 10 MG/1
5 TABLET, COATED ORAL DAILY
Qty: 90 TABLET | Refills: 3 | Status: SHIPPED | OUTPATIENT
Start: 2023-10-24

## 2023-10-24 RX ORDER — METOPROLOL SUCCINATE 25 MG/1
25 TABLET, EXTENDED RELEASE ORAL DAILY
Qty: 90 TABLET | Refills: 3 | Status: SHIPPED | OUTPATIENT
Start: 2023-10-24

## 2023-10-24 RX ORDER — ESOMEPRAZOLE MAGNESIUM 40 MG/1
40 CAPSULE, DELAYED RELEASE ORAL
Qty: 90 CAPSULE | Refills: 3 | Status: SHIPPED | OUTPATIENT
Start: 2023-10-24 | End: 2024-10-23

## 2023-10-24 RX ORDER — ESOMEPRAZOLE MAGNESIUM 20 MG/1
20 GRANULE, DELAYED RELEASE ORAL
COMMUNITY
End: 2023-10-24

## 2023-10-24 NOTE — PROGRESS NOTES
Subjective:       Patient ID: Gayathri Mack is a 83 y.o. female.    Chief Complaint: Follow-up   Follow-up  Pertinent negatives include no chest pain or joint swelling.       Gyn: no defers/ MMG:  No defers  Dexa: 2020 get old records//[place] // 2022 Osteoporosis Fosamax -plan this yr is Prolia 2023-24  Colonoscopy:  never   Immunizations: Flu:  Yes Tdap: Get old record Pneumovax: 2018 Prevnar 13: 2014 Shingles: Y Covid:Y  Smoker: former - quit 2020      Follow up     Hypertension:  controlled Rx Valsartan 160 twice daily   Prev Lisinopril 20            Cardio Dr Everett      Anxiety: much improved per pt and daughter w Rx Lexapro 5 - she would like to increase to 10 mg.  Sleeping better and less worried.    History of worry your about everything     Glucose intolerance:  stable A1c 5.9, G 87      Thyroid nodule: s/p biospy. 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    Dr Everett cardio      HLD: goal < 70 // controlled Rx Crestor 10, Zetia 10      COPD emphysema:  Stop smoking 2020// mgmt pulm [place].  Rx Trelegy   PFT FEV1 60 Trilogy inhaler. 2022: CT + right apex nodule.      Cochlear implant lift -- hearing loss since child    Regular vaccines does need a booster of pneumonia 20     Alopecia wears a wig     ____________________________________________________________________________________________________  Assessment & Plan:  1. MARU (generalized anxiety disorder)  - EScitalopram oxalate (LEXAPRO) 10 MG tablet; Take 1 tablet (10 mg total) by mouth once daily. anxiety  Dispense: 90 tablet; Refill: 3    2. Chronic obstructive pulmonary disease, unspecified COPD type  - montelukast (SINGULAIR) 10 mg tablet; Take 1 tablet (10 mg total) by mouth once daily.  Dispense: 90 tablet; Refill: 3    3. Essential hypertension  - metoprolol succinate (TOPROL-XL) 25 MG 24 hr tablet; Take 1 tablet (25 mg total) by  mouth once daily.  Dispense: 90 tablet; Refill: 3    4. Mixed hyperlipidemia  - rosuvastatin (CRESTOR) 10 MG tablet; Take 0.5 tablets (5 mg total) by mouth once daily.  Dispense: 90 tablet; Refill: 3    5. Gastroesophageal reflux disease without esophagitis  - esomeprazole (NEXIUM) 40 MG capsule; Take 1 capsule (40 mg total) by mouth before breakfast.  Dispense: 90 capsule; Refill: 3    6. COPD with asthma  - fluticasone-umeclidin-vilanter (TRELEGY ELLIPTA) 200-62.5-25 mcg inhaler; Inhale 1 puff into the lungs once daily.  Dispense: 60 each; Refill: 6    7. Menopause  - DXA Bone Density Axial Skeleton 1 or more sites; Future    8. Age related osteoporosis, unspecified pathological fracture presence  - DXA Bone Density Axial Skeleton 1 or more sites; Future    9. Worries  - EScitalopram oxalate (LEXAPRO) 10 MG tablet; Take 1 tablet (10 mg total) by mouth once daily. anxiety  Dispense: 90 tablet; Refill: 3     MARU (generalized anxiety disorder)  -     EScitalopram oxalate (LEXAPRO) 10 MG tablet; Take 1 tablet (10 mg total) by mouth once daily. anxiety  Dispense: 90 tablet; Refill: 3    Chronic obstructive pulmonary disease, unspecified COPD type  -     montelukast (SINGULAIR) 10 mg tablet; Take 1 tablet (10 mg total) by mouth once daily.  Dispense: 90 tablet; Refill: 3    Essential hypertension  -     metoprolol succinate (TOPROL-XL) 25 MG 24 hr tablet; Take 1 tablet (25 mg total) by mouth once daily.  Dispense: 90 tablet; Refill: 3    Mixed hyperlipidemia  -     rosuvastatin (CRESTOR) 10 MG tablet; Take 0.5 tablets (5 mg total) by mouth once daily.  Dispense: 90 tablet; Refill: 3    Gastroesophageal reflux disease without esophagitis  -     esomeprazole (NEXIUM) 40 MG capsule; Take 1 capsule (40 mg total) by mouth before breakfast.  Dispense: 90 capsule; Refill: 3    COPD with asthma  -     fluticasone-umeclidin-vilanter (TRELEGY ELLIPTA) 200-62.5-25 mcg inhaler; Inhale 1 puff into the lungs once daily.  Dispense: 60  each; Refill: 6    Menopause  -     DXA Bone Density Axial Skeleton 1 or more sites; Future; Expected date: 02/24/2024    Age related osteoporosis, unspecified pathological fracture presence  -     DXA Bone Density Axial Skeleton 1 or more sites; Future; Expected date: 02/24/2024    Worries  -     EScitalopram oxalate (LEXAPRO) 10 MG tablet; Take 1 tablet (10 mg total) by mouth once daily. anxiety  Dispense: 90 tablet; Refill: 3    Other orders  -     Influenza - Quadrivalent (Adjuvanted)  -     diphth,pertus,acell,,tetanus (BOOSTRIX) 2.5-8-5 Lf-mcg-Lf/0.5mL Syrg injection; Inject 0.5 mLs into the muscle once. for 1 dose  Dispense: 0.5 mL; Refill: 0        Continue to work on regular exercise, maintain healthy weight, balanced diet. Avoid unhealthy habits: smoking, excessive alcohol intake.     Disclaimer: This note was partly generated using dictation software which may occasionally result in transcription errors  ____________________________________________________________________________________________________  Review of Systems:  Review of Systems   Constitutional:  Negative for appetite change.   HENT:  Negative for nosebleeds.    Eyes:  Negative for pain.   Respiratory:  Negative for choking.    Cardiovascular:  Negative for chest pain.   Gastrointestinal:  Negative for blood in stool.   Genitourinary:  Negative for hematuria.   Musculoskeletal:  Negative for joint swelling.   Skin:  Negative for pallor.   Neurological:  Negative for facial asymmetry.   Hematological:  Does not bruise/bleed easily.   Psychiatric/Behavioral:  Negative for confusion.        Objective:     Wt Readings from Last 3 Encounters:   10/24/23 69.7 kg (153 lb 12.3 oz)   09/20/23 69.5 kg (153 lb 3.5 oz)   04/27/23 71.2 kg (156 lb 13.7 oz)     BP Readings from Last 3 Encounters:   10/24/23 138/86   09/20/23 (!) 152/82   04/27/23 118/80       Lab Results   Component Value Date    WBC 8.94 07/03/2023    HGB 13.9 07/03/2023    HCT 43.6  07/03/2023     07/03/2023     07/03/2023    K 4.4 07/03/2023    K 4.4 07/03/2023     07/03/2023    ALT 25 07/03/2023    AST 23 07/03/2023    CO2 24 07/03/2023    CREATININE 0.7 07/03/2023    BUN 22 07/03/2023    GLU 87 07/03/2023      Hemoglobin A1C   Date Value Ref Range Status   07/03/2023 5.9 4.5 - 6.2 % Final     Comment:     According to ADA guidelines, hemoglobin A1C <7.0% represents  optimal control in non-pregnant diabetic patients.  Different  metrics may apply to specific populations.   Standards of Medical Care in Diabetes - 2016.    For the purpose of screening for the presence of diabetes:  <5.7%     Consistent with the absence of diabetes  5.7-6.4%  Consistent with increasing risk for diabetes   (prediabetes)  >or=6.5%  Consistent with diabetes    Currently no consensus exists for use of hemoglobin A1C  for diagnosis of diabetes for children.     02/13/2023 5.7 (H) 4.0 - 5.6 % Final     Comment:     ADA Screening Guidelines:  5.7-6.4%  Consistent with prediabetes  >or=6.5%  Consistent with diabetes    High levels of fetal hemoglobin interfere with the HbA1C  assay. Heterozygous hemoglobin variants (HbS, HgC, etc)do  not significantly interfere with this assay.   However, presence of multiple variants may affect accuracy.        Lab Results   Component Value Date    TSH 0.893 07/03/2023    TSH 0.747 04/19/2023    TSH 1.347 02/13/2023     Lab Results   Component Value Date    FREET4 1.15 04/19/2023    FREET4 1.30 02/13/2023    FREET4 0.90 10/12/2022     Lab Results   Component Value Date    LDLCALC 84.4 07/03/2023    LDLCALC 76.0 02/13/2023    LDLCALC 169.6 (H) 01/26/2022     Lab Results   Component Value Date    TRIG 83 07/03/2023    TRIG 95 02/13/2023    TRIG 107 01/26/2022            Physical Exam  Constitutional:       Appearance: Normal appearance.   HENT:      Head: Normocephalic and atraumatic.   Eyes:      Extraocular Movements: Extraocular movements intact.      Pupils: Pupils  are equal, round, and reactive to light.   Cardiovascular:      Rate and Rhythm: Normal rate.   Pulmonary:      Effort: Pulmonary effort is normal.   Neurological:      Mental Status: She is alert.         Medication List with Changes/Refills   New Medications    DIPHTH,PERTUS,ACELL,,TETANUS (BOOSTRIX) 2.5-8-5 LF-MCG-LF/0.5ML SYRG INJECTION    Inject 0.5 mLs into the muscle once. for 1 dose    ESOMEPRAZOLE (NEXIUM) 40 MG CAPSULE    Take 1 capsule (40 mg total) by mouth before breakfast.   Current Medications    ALBUTEROL (VENTOLIN HFA) 90 MCG/ACTUATION INHALER    Inhale 2 puffs into the lungs every 4 (four) hours as needed for Wheezing or Shortness of Breath. Rescue    ALBUTEROL-IPRATROPIUM (DUO-NEB) 2.5 MG-0.5 MG/3 ML NEBULIZER SOLUTION    Take 3 mLs by nebulization every 6 (six) hours as needed for Wheezing. Rescue    ALENDRONATE (FOSAMAX) 70 MG TABLET    Take 1 tablet (70 mg total) by mouth every 7 days.    EZETIMIBE (ZETIA) 10 MG TABLET    TAKE 1 TABLET BY MOUTH ONCE  DAILY    VALSARTAN (DIOVAN) 160 MG TABLET    Take 1 tablet (160 mg total) by mouth 2 (two) times daily. Blood pressure   Changed and/or Refilled Medications    Modified Medication Previous Medication    ESCITALOPRAM OXALATE (LEXAPRO) 10 MG TABLET EScitalopram oxalate (LEXAPRO) 5 MG Tab       Take 1 tablet (10 mg total) by mouth once daily. anxiety    Take 1 tablet (5 mg total) by mouth once daily. anxiety    FLUTICASONE-UMECLIDIN-VILANTER (TRELEGY ELLIPTA) 200-62.5-25 MCG INHALER fluticasone-umeclidin-vilanter (TRELEGY ELLIPTA) 200-62.5-25 mcg inhaler       Inhale 1 puff into the lungs once daily.    Inhale 1 puff into the lungs once daily.    METOPROLOL SUCCINATE (TOPROL-XL) 25 MG 24 HR TABLET metoprolol succinate (TOPROL-XL) 25 MG 24 hr tablet       Take 1 tablet (25 mg total) by mouth once daily.    Take 25 mg by mouth.    MONTELUKAST (SINGULAIR) 10 MG TABLET montelukast (SINGULAIR) 10 mg tablet       Take 1 tablet (10 mg total) by mouth once  daily.    Take 1 tablet (10 mg total) by mouth once daily.    ROSUVASTATIN (CRESTOR) 10 MG TABLET rosuvastatin (CRESTOR) 10 MG tablet       Take 0.5 tablets (5 mg total) by mouth once daily.    Take 5 mg by mouth once daily.   Discontinued Medications    AZELASTINE (ASTELIN) 137 MCG (0.1 %) NASAL SPRAY    1 spray (137 mcg total) by Nasal route 2 (two) times daily.    ESOMEPRAZOLE (NEXIUM) 20 MG GRPS    Take 20 mg by mouth before breakfast.

## 2023-10-26 ENCOUNTER — TELEPHONE (OUTPATIENT)
Dept: ENDOCRINOLOGY | Facility: CLINIC | Age: 83
End: 2023-10-26
Payer: MEDICARE

## 2023-10-26 NOTE — TELEPHONE ENCOUNTER
Called Beatrice subramanian to reschedule pt's appt.  R/S to 10/30/23.    ----- Message from Catherine Freedman sent at 10/26/2023  1:04 PM CDT -----  Contact: Beatrice Daughter  Patient's daughter is calling to reschedule the appt on 11/03, stated she has to work that day and is wanting to see if we can please get it rescheduled to a Monday ot Tuesday. The earliest it gave me was in June. Can we please call back at [phone]. Thank You.

## 2023-11-02 PROCEDURE — G0179 PR HOME HEALTH MD RECERTIFICATION: ICD-10-PCS | Mod: ,,, | Performed by: INTERNAL MEDICINE

## 2023-11-02 PROCEDURE — G0179 MD RECERTIFICATION HHA PT: HCPCS | Mod: ,,, | Performed by: INTERNAL MEDICINE

## 2023-11-14 ENCOUNTER — EXTERNAL HOME HEALTH (OUTPATIENT)
Dept: HOME HEALTH SERVICES | Facility: HOSPITAL | Age: 83
End: 2023-11-14
Payer: MEDICARE

## 2023-12-06 DIAGNOSIS — J06.9 ACUTE URI: ICD-10-CM

## 2023-12-06 DIAGNOSIS — Z87.09 HISTORY OF COPD: ICD-10-CM

## 2023-12-06 DIAGNOSIS — R06.2 WHEEZING: ICD-10-CM

## 2023-12-06 DIAGNOSIS — R05.9 COUGH: ICD-10-CM

## 2023-12-06 DIAGNOSIS — J44.1 CHRONIC OBSTRUCTIVE PULMONARY DISEASE WITH ACUTE EXACERBATION: ICD-10-CM

## 2023-12-06 RX ORDER — CODEINE PHOSPHATE AND GUAIFENESIN 10; 100 MG/5ML; MG/5ML
5 SOLUTION ORAL EVERY 4 HOURS PRN
Qty: 210 ML | Refills: 0 | Status: CANCELLED | OUTPATIENT
Start: 2023-12-06 | End: 2023-12-13

## 2023-12-06 RX ORDER — IPRATROPIUM BROMIDE AND ALBUTEROL SULFATE 2.5; .5 MG/3ML; MG/3ML
3 SOLUTION RESPIRATORY (INHALATION) EVERY 6 HOURS PRN
Qty: 25 EACH | Refills: 2 | Status: CANCELLED | OUTPATIENT
Start: 2023-12-06 | End: 2024-12-05

## 2023-12-06 NOTE — TELEPHONE ENCOUNTER
----- Message from Vaibhav Doherty sent at 12/6/2023 12:08 PM CST -----  Contact: Daughter/Beatrice  Type:  RX Refill Request    Who Called:  Daughter/Beatrice  Refill or New Rx:  Refill  RX Name and Strength:  codeine guaifen   How is the patient currently taking it? (ex. 1XDay):    Is this a 30 day or 90 day RX:    Preferred Pharmacy with phone number:        [id number] - [place], [place] - [address] [facility]  [address]  [facility] [address]  Phone: [phone] Fax: [phone]          Local or Mail Order:    Ordering Provider:    Best Call Back Number: [phone]    Additional Information:  Daughter states PT went to urgent care this weekend and was diagnosed to RSV.  She would like to speak to the nurse.  Also, she states mom was prescribed codeine guaifen  by Dr. Albarran before, mom  has been taking it but ran out last night and wants to have it refilled.

## 2023-12-06 NOTE — TELEPHONE ENCOUNTER
Spoke to pt's home health nurse in regards to a refill on .   Last ordered on the [facility] was 2/3/23  Last office visit-4/14/23   Pt has RSV and was prscribed Doxycycline, Phenergan and Budesonide.

## 2023-12-06 NOTE — TELEPHONE ENCOUNTER
----- Message from Vaibhav Doherty sent at 12/6/2023 12:08 PM CST -----  Contact: Daughter/Beatrice  Type:  RX Refill Request    Who Called:  Daughter/Beatrice  Refill or New Rx:  Refill  RX Name and Strength:  codeine guaifen   How is the patient currently taking it? (ex. 1XDay):    Is this a 30 day or 90 day RX:    Preferred Pharmacy with phone number:        [id number] - [place], [place] - [address] [facility] [address] [facility]  [address]  [place] [address]  Phone: [phone] Fax: [phone]          Local or Mail Order:    Ordering Provider:    Best Call Back Number: [phone]    Additional Information:  Daughter states PT went to urgent care this weekend and was diagnosed to RSV.  She would like to speak to the nurse.  Also, she states mom was prescribed codeine guaifen  by Dr. Albarran before, mom  has been taking it but ran out last night and wants to have it refilled.

## 2023-12-07 NOTE — TELEPHONE ENCOUNTER
Sent refill request to NP   ----- Message from Renee Boykin sent at 12/7/2023  1:19 PM CST -----  Contact: DaughterBeatrice  Type:  RX Refill Request    Who Called:   Daughter, Beatrice  Refill or New Rx:  Refill    RX Name and Strength:   ipratropium bromide and albuterol sulphate inhalation solution .5/3 mg      Preferred Pharmacy with phone number:      [id number] - [place], [place] - [address] AT [facility] [address]  [address]  [place] [address]  Phone: [phone] Fax: [phone]    Local or Mail Order:  Local  Ordering Provider:   Yolanda Albarran    Would the patient rather a call back or a response via [facility]?   Call back  Best Call Back Number:  [phone] - Beatrice    Additional Information:   States she would like a call back to speak with someone, please - states she is about to run out of this medication - please send to pharmacy - please call to advise - thank you

## 2023-12-07 NOTE — TELEPHONE ENCOUNTER
----- Message from Evelyn Cobb sent at 12/7/2023  9:22 AM CST -----  Regarding: Refill  Type:  RX Refill Request    Who Called: Pt daughter    Refill or New Rx:Refill     RX Name and Strength:Ipratropi-ALB and guaiFENesin-codeine 100-10 mg/5 ml (TUSSI-ORGANIDIN NR)  mg/5 mL syrup      Preferred Pharmacy with phone number:    [id number] - [place], [place] - [address] [facility] [address] [facility]  [address]  [place] [address]  Phone: [phone] Fax: [phone]    Local or Mail Order:Local      Would the patient rather a call back or a response via [facility]? Call back    Best Call Back Number:[phone]    Additional Information: Dr Albarran is out of town and is asking for DR Deal to please do refill.Please advise ----thank you

## 2023-12-07 NOTE — TELEPHONE ENCOUNTER
Please advise  LOV:10/24/23  Nxt Apt: 3/25/24  Last Fill:      Dr Albarran is out of town and is asking for DR Deal to please do refill.

## 2023-12-08 ENCOUNTER — TELEPHONE (OUTPATIENT)
Dept: PULMONOLOGY | Facility: CLINIC | Age: 83
End: 2023-12-08
Payer: MEDICARE

## 2023-12-08 RX ORDER — CODEINE PHOSPHATE AND GUAIFENESIN 10; 100 MG/5ML; MG/5ML
5 SOLUTION ORAL EVERY 12 HOURS PRN
Qty: 70 ML | Refills: 0 | Status: SHIPPED | OUTPATIENT
Start: 2023-12-08 | End: 2023-12-15

## 2023-12-08 RX ORDER — IPRATROPIUM BROMIDE AND ALBUTEROL SULFATE 2.5; .5 MG/3ML; MG/3ML
3 SOLUTION RESPIRATORY (INHALATION) EVERY 6 HOURS PRN
Qty: 25 EACH | Refills: 2 | Status: SHIPPED | OUTPATIENT
Start: 2023-12-08 | End: 2023-12-11 | Stop reason: SDUPTHER

## 2023-12-08 NOTE — TELEPHONE ENCOUNTER
Placed a call to the pt to see in the coughing was getting better. Pt coughing has gotten better. Will call on Monday to see how she is doing.

## 2023-12-11 ENCOUNTER — TELEPHONE (OUTPATIENT)
Dept: PULMONOLOGY | Facility: CLINIC | Age: 83
End: 2023-12-11
Payer: MEDICARE

## 2023-12-11 DIAGNOSIS — R06.2 WHEEZING: ICD-10-CM

## 2023-12-11 DIAGNOSIS — Z87.09 HISTORY OF COPD: ICD-10-CM

## 2023-12-11 DIAGNOSIS — R05.9 COUGH: ICD-10-CM

## 2023-12-11 DIAGNOSIS — J06.9 ACUTE URI: ICD-10-CM

## 2023-12-11 RX ORDER — IPRATROPIUM BROMIDE AND ALBUTEROL SULFATE 2.5; .5 MG/3ML; MG/3ML
3 SOLUTION RESPIRATORY (INHALATION) EVERY 6 HOURS PRN
Qty: 25 EACH | Refills: 2 | Status: SHIPPED | OUTPATIENT
Start: 2023-12-11 | End: 2024-12-10

## 2023-12-11 NOTE — TELEPHONE ENCOUNTER
Placed a call to the pt in regards to her coughing pt had last week. Pt is doing better. Her cougiing had gotten better. Pt will call if she gets  worjse

## 2023-12-11 NOTE — TELEPHONE ENCOUNTER
Placed a call to the pt in regards to her coughing pt had last week. Pt is doing better. Her cougiing had gotten better. Pt will call if she gets  worjse.

## 2023-12-11 NOTE — TELEPHONE ENCOUNTER
----- Message from Kiarra Stroud sent at 12/8/2023  2:02 PM CST -----  Contact: Pt's frined  Type:  Patient Returning Call    Who Called:  Pt's friend  Who Left Message for Patient:  Prema  Does the patient know what this is regarding?:  no  Best Call Back Number: [phone]  Additional Information:  Please call the patient back at the phone number listed above to advise. Thank you!

## 2023-12-18 ENCOUNTER — LAB VISIT (OUTPATIENT)
Dept: LAB | Facility: HOSPITAL | Age: 83
End: 2023-12-18
Attending: INTERNAL MEDICINE
Payer: MEDICARE

## 2023-12-18 DIAGNOSIS — I10 ESSENTIAL HYPERTENSION, MALIGNANT: Primary | ICD-10-CM

## 2023-12-18 DIAGNOSIS — I25.10 CORONARY ATHEROSCLEROSIS OF NATIVE CORONARY ARTERY: ICD-10-CM

## 2023-12-18 DIAGNOSIS — E21.3 HYPERPARATHYROIDISM, UNSPECIFIED: ICD-10-CM

## 2023-12-18 LAB
ALBUMIN SERPL BCP-MCNC: 3.6 G/DL (ref 3.5–5.2)
ALP SERPL-CCNC: 41 U/L (ref 55–135)
ALT SERPL W/O P-5'-P-CCNC: 26 U/L (ref 10–44)
ANION GAP SERPL CALC-SCNC: 10 MMOL/L (ref 8–16)
AST SERPL-CCNC: 29 U/L (ref 10–40)
BILIRUB SERPL-MCNC: 0.5 MG/DL (ref 0.1–1)
BUN SERPL-MCNC: 13 MG/DL (ref 8–23)
CALCIUM SERPL-MCNC: 9.2 MG/DL (ref 8.7–10.5)
CHLORIDE SERPL-SCNC: 108 MMOL/L (ref 95–110)
CHOLEST SERPL-MCNC: 111 MG/DL (ref 120–199)
CHOLEST/HDLC SERPL: 2.4 {RATIO} (ref 2–5)
CO2 SERPL-SCNC: 23 MMOL/L (ref 23–29)
CREAT SERPL-MCNC: 0.8 MG/DL (ref 0.5–1.4)
EST. GFR  (NO RACE VARIABLE): >60 ML/MIN/1.73 M^2
ESTIMATED AVG GLUCOSE: 114 MG/DL (ref 68–131)
GLUCOSE SERPL-MCNC: 86 MG/DL (ref 70–110)
HBA1C MFR BLD: 5.6 % (ref 4–5.6)
HDLC SERPL-MCNC: 46 MG/DL (ref 40–75)
HDLC SERPL: 41.4 % (ref 20–50)
LDLC SERPL CALC-MCNC: 51.2 MG/DL (ref 63–159)
NONHDLC SERPL-MCNC: 65 MG/DL
POTASSIUM SERPL-SCNC: 3.9 MMOL/L (ref 3.5–5.1)
PROT SERPL-MCNC: 6.4 G/DL (ref 6–8.4)
SODIUM SERPL-SCNC: 141 MMOL/L (ref 136–145)
TRIGL SERPL-MCNC: 69 MG/DL (ref 30–150)

## 2023-12-18 PROCEDURE — 80061 LIPID PANEL: CPT | Performed by: INTERNAL MEDICINE

## 2023-12-18 PROCEDURE — 83036 HEMOGLOBIN GLYCOSYLATED A1C: CPT | Performed by: INTERNAL MEDICINE

## 2023-12-18 PROCEDURE — 84443 ASSAY THYROID STIM HORMONE: CPT | Performed by: INTERNAL MEDICINE

## 2023-12-18 PROCEDURE — 80053 COMPREHEN METABOLIC PANEL: CPT | Performed by: INTERNAL MEDICINE

## 2023-12-18 PROCEDURE — 85025 COMPLETE CBC W/AUTO DIFF WBC: CPT | Performed by: INTERNAL MEDICINE

## 2023-12-18 PROCEDURE — 84439 ASSAY OF FREE THYROXINE: CPT | Performed by: INTERNAL MEDICINE

## 2023-12-19 ENCOUNTER — TELEPHONE (OUTPATIENT)
Dept: PULMONOLOGY | Facility: CLINIC | Age: 83
End: 2023-12-19
Payer: MEDICARE

## 2023-12-19 DIAGNOSIS — J44.9 CHRONIC OBSTRUCTIVE PULMONARY DISEASE, UNSPECIFIED COPD TYPE: Primary | ICD-10-CM

## 2023-12-19 LAB
BASOPHILS # BLD AUTO: 0.05 K/UL (ref 0–0.2)
BASOPHILS NFR BLD: 0.9 % (ref 0–1.9)
DIFFERENTIAL METHOD: NORMAL
EOSINOPHIL # BLD AUTO: 0.1 K/UL (ref 0–0.5)
EOSINOPHIL NFR BLD: 1.8 % (ref 0–8)
ERYTHROCYTE [DISTWIDTH] IN BLOOD BY AUTOMATED COUNT: 13.6 % (ref 11.5–14.5)
HCT VFR BLD AUTO: 41.1 % (ref 37–48.5)
HGB BLD-MCNC: 13.3 G/DL (ref 12–16)
IMM GRANULOCYTES # BLD AUTO: 0.02 K/UL (ref 0–0.04)
IMM GRANULOCYTES NFR BLD AUTO: 0.4 % (ref 0–0.5)
LYMPHOCYTES # BLD AUTO: 1.6 K/UL (ref 1–4.8)
LYMPHOCYTES NFR BLD: 27.8 % (ref 18–48)
MCH RBC QN AUTO: 28.5 PG (ref 27–31)
MCHC RBC AUTO-ENTMCNC: 32.4 G/DL (ref 32–36)
MCV RBC AUTO: 88 FL (ref 82–98)
MONOCYTES # BLD AUTO: 0.5 K/UL (ref 0.3–1)
MONOCYTES NFR BLD: 9.5 % (ref 4–15)
NEUTROPHILS # BLD AUTO: 3.4 K/UL (ref 1.8–7.7)
NEUTROPHILS NFR BLD: 59.6 % (ref 38–73)
NRBC BLD-RTO: 0 /100 WBC
PLATELET # BLD AUTO: 275 K/UL (ref 150–450)
PMV BLD AUTO: 11.4 FL (ref 9.2–12.9)
RBC # BLD AUTO: 4.66 M/UL (ref 4–5.4)
T4 FREE SERPL-MCNC: 0.97 NG/DL (ref 0.71–1.51)
TSH SERPL DL<=0.005 MIU/L-ACNC: 0.33 UIU/ML (ref 0.4–4)
WBC # BLD AUTO: 5.71 K/UL (ref 3.9–12.7)

## 2023-12-19 NOTE — TELEPHONE ENCOUNTER
Placed a call to the pt in regards to a call back. Pt is feeling better. Pt is requesting a new nebulizer machine. Routed to the provider.

## 2023-12-19 NOTE — TELEPHONE ENCOUNTER
----- Message from Nadiya Delacruz sent at 12/19/2023 10:44 AM CST -----  Type:  Patient Returning Call    Who Called:  pt's daughter  Who Left Message for Patient:  Prema  Does the patient know what this is regarding?:  no  Best Call Back Number:  [phone]    Additional Information:  Pt is returning a call in regards to a missed call with VM to call back. Please call back and advise. Thanks!

## 2023-12-19 NOTE — TELEPHONE ENCOUNTER
Placed a call to the pt in regards to the cough syrup. Left a message for the pt to call back if she needs the cough syrup.

## 2024-01-12 DIAGNOSIS — J44.9 CHRONIC OBSTRUCTIVE PULMONARY DISEASE, UNSPECIFIED COPD TYPE: ICD-10-CM

## 2024-01-12 RX ORDER — ALBUTEROL SULFATE 90 UG/1
2 AEROSOL, METERED RESPIRATORY (INHALATION) EVERY 4 HOURS PRN
Qty: 54 G | Refills: 3 | Status: SHIPPED | OUTPATIENT
Start: 2024-01-12

## 2024-01-22 ENCOUNTER — TELEPHONE (OUTPATIENT)
Dept: FAMILY MEDICINE | Facility: CLINIC | Age: 84
End: 2024-01-22
Payer: MEDICARE

## 2024-01-22 NOTE — TELEPHONE ENCOUNTER
----- Message from Libertad Mejia, Patient Care Assistant sent at 1/22/2024 10:41 AM CST -----  Regarding: orders  Contact: pt's daughter Beatrice  Type: Needs Medical Advice    Who Called:  pt's daughter Beatrice     Best Call Back Number: [phone] (home)     Additional Information: pt's daughter Beatrice states she would like a callback regarding orders for home health. Please call to advise. Thanks!

## 2024-01-22 NOTE — TELEPHONE ENCOUNTER
Spoke w/ dtr. Pt had HH once weekly. States there is a new girl in the office and that something happened electronically to where they cannot sched the nurse to come out and they need another order or recert signed. Advised her to have HH send us the recert form for Dr Fuller to sign. Can be done through the HUB or by fax. She agreed and will reach out to [facility]

## 2024-01-24 ENCOUNTER — OFFICE VISIT (OUTPATIENT)
Dept: ENDOCRINOLOGY | Facility: CLINIC | Age: 84
End: 2024-01-24
Payer: MEDICARE

## 2024-01-24 VITALS
DIASTOLIC BLOOD PRESSURE: 80 MMHG | SYSTOLIC BLOOD PRESSURE: 130 MMHG | HEIGHT: 61 IN | TEMPERATURE: 98 F | BODY MASS INDEX: 27.26 KG/M2 | HEART RATE: 70 BPM | WEIGHT: 144.38 LBS | OXYGEN SATURATION: 95 %

## 2024-01-24 DIAGNOSIS — E04.2 MULTIPLE THYROID NODULES: Primary | ICD-10-CM

## 2024-01-24 DIAGNOSIS — E55.9 HYPOVITAMINOSIS D: ICD-10-CM

## 2024-01-24 DIAGNOSIS — E21.3 HYPERPARATHYROIDISM: ICD-10-CM

## 2024-01-24 DIAGNOSIS — M81.0 OSTEOPOROSIS, UNSPECIFIED OSTEOPOROSIS TYPE, UNSPECIFIED PATHOLOGICAL FRACTURE PRESENCE: ICD-10-CM

## 2024-01-24 PROCEDURE — 99999 PR PBB SHADOW E&M-EST. PATIENT-LVL IV: CPT | Mod: PBBFAC,,, | Performed by: PHYSICIAN ASSISTANT

## 2024-01-24 PROCEDURE — 99214 OFFICE O/P EST MOD 30 MIN: CPT | Mod: PBBFAC,PO | Performed by: PHYSICIAN ASSISTANT

## 2024-01-24 PROCEDURE — 99213 OFFICE O/P EST LOW 20 MIN: CPT | Mod: S$PBB,,, | Performed by: PHYSICIAN ASSISTANT

## 2024-01-24 NOTE — PROGRESS NOTES
"CC: Thyroid Nodule    HPI: Gayathri Mack is a 83 y.o. female here for thyroid nodules along with pending conditions listed in the Visit Diagnosis. Diagnosed in in 6/21 on a chest CT performed by her CT. +FHx of thyroid nodules in her daughter and mother.    Thyroid u/s showed a 1.9 and 1.5 cm nodule in the left lobe. FNA performed of 1.9 cm nodule was benign 5/22. No sob, dysphagia or voice changes. Pt here w/ daughter.   PTH was elevated but is now normal. No n/v, abdominal pain or kidney stones. No n/v or abdominal pain. She has one kidney stone seen on imaging.     Dexa 2/22 shows osteoporosis. On fosamax  11/18-11/23. Her sister has osteoporosis. Taking ca and vd. No exercise. Plans to attend [employer] 2x daily fo 1 hour.  No falls, fractures. She one steriod injection for RSV a few weeks ago. Taking ca and vd.    PMHx, PSHx: reviewed in epic. COPD.    Social Hx: no ETOH/tobacco use. Former smoker who quit two years ago. She smoked 1 ppd since she was 19.     Wt Readings from Last 6 Encounters:   01/24/24 65.5 kg (144 lb 6.4 oz)   10/24/23 69.7 kg (153 lb 12.3 oz)   09/20/23 69.5 kg (153 lb 3.5 oz)   04/27/23 71.2 kg (156 lb 13.7 oz)   02/07/23 70.9 kg (156 lb 6.7 oz)   10/26/22 71.5 kg (157 lb 8.3 oz)      ROS:   Constitutional: + wt loss 12 lbs  Eyes: No recent visual changes  Cardiovascular: Denies current anginal symptoms  Respiratory: Denies current respiratory difficulty  Gastrointestinal: Denies recent bowel disturbances  GenitoUrinary - No dysuria  Skin: No new skin rash  Neurologic: No focal neurologic complaints  Musculoskeletal: no joint pain  Endocrine: no polyphagia, polydipsia or polyuria  Remainder ROS negative     /80 (BP Location: Right arm, Patient Position: Sitting, BP Method: Small (Manual))   Pulse 70   Temp 98.1 °F (36.7 °C) (Oral)   Ht 5' 1" (1.549 m)   Wt 65.5 kg (144 lb 6.4 oz)   SpO2 95%   BMI 27.28 kg/m²      Personally reviewed labs below:    Lab Results "   Component Value Date    TSH 0.327 (L) 12/18/2023    FREET4 0.97 12/18/2023        Chemistry        Component Value Date/Time     12/18/2023 1044    K 3.9 12/18/2023 1044     12/18/2023 1044    CO2 23 12/18/2023 1044    BUN 13 12/18/2023 1044    CREATININE 0.8 12/18/2023 1044    GLU 86 12/18/2023 1044        Component Value Date/Time    CALCIUM 9.2 12/18/2023 1044    ALKPHOS 41 (L) 12/18/2023 1044    AST 29 12/18/2023 1044    ALT 26 12/18/2023 1044    BILITOT 0.5 12/18/2023 1044    ESTGFRAFRICA >60 05/09/2022 0525    EGFRNONAA >60 05/09/2022 0525         Lab Results   Component Value Date    HGBA1C 5.6 12/18/2023    HGBA1C 5.9 07/03/2023    HGBA1C 5.7 (H) 02/13/2023      US THYROID. A complete ultrasound examination of the thyroid was done with grayscale and color flow Doppler imaging where appropriate.     COMPARISON:  Most recent ultrasound from February 21, 2022     FINDINGS:  RIGHT lobe: The right lobe of the thyroid is normal in size and mildly heterogeneous in background echogenicity measuring 5.9 x 2.6 x 2.1 cm. The background vascularity of the thyroid gland is normal.     LEFT lobe: The left lobe of the thyroid is normal in size and mildly heterogeneous in background echogenicity measuring 4.7 x 2.4 x 2.3 cm. The background vascularity of the thyroid gland is normal.     ISTHMUS: The thyroid isthmus measures 2 mm.     LYMPH NODES: No gross lymphadenopathy is seen in the central neck.     THYROID NODULES:  NODULE #1: Right lower lobe  Size: 9 x 8 x 7 mm, previously 8 x 8 x 7 mm  Composition: solid or almost completely solid (2 points)  Echogenicity: hyperechoic or isoechoic (1 point)  Shape: wider-than-tall (0 points)  Margin: ill-defined (0 points)  Echogenic Foci: none or large comet-tail artifacts (0 points)  Nodule TI-RADS score: TR3 (3 pts). less than 1.5 cm; no follow-up.     NODULE #2: Left mid lobe  Size: 19 x 15 x 15 mm, previously 19 x 15 x 15 mm  Composition: solid or almost  completely solid (2 points)  Echogenicity: hyperechoic or isoechoic (1 point)  Shape: wider-than-tall (0 points)  Margin: smooth (0 points)  Echogenic Foci: none or large comet-tail artifacts (0 points)  Nodule TI-RADS score: TR3 (3 pts). Mildly suspicious. 1.5-2.5 cm follow up.     NODULE #3: Left lower lobe  Size: 14 x 13 x 9 mm, previously 14 x 11 x 9 mm  Composition: solid or almost completely solid (2 points)  Echogenicity: hyperechoic or isoechoic (1 point)  Shape: wider-than-tall (0 points)  Margin: ill-defined (0 points)  Echogenic Foci: none or large comet-tail artifacts (0 points)  Nodule TI-RADS score: TR3 (3 pts). less than 1.5 cm; no follow-up.     NODULE #4: Adjacent left lower lobe  Size: 11 x 8 x 8 mm, previously 10 x 7 x 6 mm  Composition: solid or almost completely solid (2 points)  Echogenicity: hyperechoic or isoechoic (1 point)  Shape: wider-than-tall (0 points)  Margin: ill-defined (0 points)  Echogenic Foci: none or large comet-tail artifacts (0 points)  Nodule TI-RADS score: TR3 (3 pts). less than 1.5 cm; no follow-up.     IMPRESSION:  Heterogeneous multinodular thyroid gland with index nodule as outlined above, without adverse interval change from the previous exam.    PE:  GENERAL: elderly female, well developed, well nourished  NECK: Supple neck, normal thyroid. No bruit  RESPIRATORY: Normal effort,   NEURO: steady gait, CN ll-Xll grossly intact  PSYCH: normal mood and affect    Assessment/Plan:   1. Multiple thyroid nodules  US Soft Tissue Head Neck Thyroid      2. Osteoporosis, unspecified osteoporosis type, unspecified pathological fracture presence        3. Hypovitaminosis D        4. Hyperparathyroidism  PTH, Intact        Nodular thyroid disease-stable-FNA benign. Repeat u/s.  The 1.5 cm nodule was not identified during the FNA.  Postmenopausal-DEXA scan 2/24. Will review dexa to determine tx or continue holiday. Continue ca and vd. Also, participate in weight bearing and resistance  exercises for 40 min 4x weekly to maintain your bone density.   Hypovitaminosis k-hcrgyf-keixbmcm vd intake.  Hyperparathyroidism-recheck pth, iCa, Ca is normal.    U/s same day as dexa  F/u in 6 mths w/ labs from last time and pth

## 2024-02-26 ENCOUNTER — HOSPITAL ENCOUNTER (OUTPATIENT)
Dept: RADIOLOGY | Facility: HOSPITAL | Age: 84
Discharge: HOME | End: 2024-02-26
Attending: INTERNAL MEDICINE
Payer: MEDICARE

## 2024-02-26 ENCOUNTER — HOSPITAL ENCOUNTER (OUTPATIENT)
Dept: RADIOLOGY | Facility: HOSPITAL | Age: 84
Discharge: HOME | End: 2024-02-26
Attending: PHYSICIAN ASSISTANT
Payer: MEDICARE

## 2024-02-26 DIAGNOSIS — M81.0 AGE RELATED OSTEOPOROSIS, UNSPECIFIED PATHOLOGICAL FRACTURE PRESENCE: ICD-10-CM

## 2024-02-26 DIAGNOSIS — Z78.0 MENOPAUSE: ICD-10-CM

## 2024-02-26 DIAGNOSIS — E04.2 MULTIPLE THYROID NODULES: ICD-10-CM

## 2024-02-26 PROCEDURE — 76536 US EXAM OF HEAD AND NECK: CPT | Mod: TC,PO

## 2024-02-26 PROCEDURE — 77080 DXA BONE DENSITY AXIAL: CPT | Mod: TC,PO

## 2024-02-27 DIAGNOSIS — Z00.00 ENCOUNTER FOR MEDICARE ANNUAL WELLNESS EXAM: ICD-10-CM

## 2024-03-14 DIAGNOSIS — J44.89 COPD WITH ASTHMA: ICD-10-CM

## 2024-03-14 RX ORDER — FLUTICASONE FUROATE, UMECLIDINIUM BROMIDE AND VILANTEROL TRIFENATATE 200; 62.5; 25 UG/1; UG/1; UG/1
POWDER RESPIRATORY (INHALATION)
Qty: 60 EACH | Refills: 2 | Status: SHIPPED | OUTPATIENT
Start: 2024-03-14 | End: 2024-04-05

## 2024-03-14 NOTE — TELEPHONE ENCOUNTER
Provider Staff:  Action required for this patient    Requires labs      Please see care gap opportunities below in Care Due Message.    Thanks!  [facility]     Appointments      Date Provider   Last Visit   10/24/2023 Carlos Deal MD   Next Visit   3/25/2024 Carlos Deal MD     Refill Decision Note   Gayathri Mack  is requesting a refill authorization.  Brief Assessment and Rationale for Refill:  Approve     Medication Therapy Plan:         Comments:     Note composed:10:26 AM 03/14/2024

## 2024-03-14 NOTE — TELEPHONE ENCOUNTER
Care Due:                  Date            Visit Type   Department     Provider  --------------------------------------------------------------------------------                                EP -                              PRIMARY      [facility] FAMILY  Last Visit: 10-      CARE ([facility])   MEDICINE       Carlos Deal                              EP -                              [facility]  Next Visit: 03-      CARE ([facility])   MEDICINE       Carlos Deal                                                            Last  Test          Frequency    Reason                     Performed    Due Date  --------------------------------------------------------------------------------    Mg Level....  12 months..  alendronate..............  Not Found    Overdue    Phosphate...  12 months..  alendronate..............  10-   10-    Vitamin D...  12 months..  alendronate..............  04-   04-    [facility] Embedded Care Due Messages. Reference number: [id number].   3/14/2024 4:39:11 AM CDT

## 2024-03-25 ENCOUNTER — TELEPHONE (OUTPATIENT)
Dept: FAMILY MEDICINE | Facility: CLINIC | Age: 84
End: 2024-03-25
Payer: MEDICARE

## 2024-03-25 NOTE — TELEPHONE ENCOUNTER
----- Message from Christen Low sent at 3/25/2024  1:01 PM CDT -----  Type:  Sooner Appointment Request    Caller is requesting a sooner appointment.  Caller declined first available appointment listed below.  Caller will not accept being placed on the waitlist and is requesting a message be sent to doctor.    Name of Caller:  pt   When is the first available appointment?  N/a   Symptoms:  resched for diff time today   Would the patient rather a call back or a response via [facility]? Call   Best Call Back Number:  [phone] (home)     Additional Information:  please advised

## 2024-03-25 NOTE — LETTER
March 25, 2024    Gayathrifeliz Mack  [address]  [address]  [place] [address]             [facility]  [address] [address]  Phone: [phone]  Fax: [phone] Dear Ms. Gayathri Mack:    We are sorry that you missed your appointment with Dr. Deal on 3/25/2024. Your health and follow-up medical care are important to us. Please call our office as soon as possible so that we may reschedule your appointment. If you have already rescheduled your appointment, please disregard this letter.    Sincerely,        Carlos Deal MD

## 2024-03-25 NOTE — TELEPHONE ENCOUNTER
Rt pt call no answer, lm to call us back. Pt wanted to reschedule today's appointment for another time today. Don't have any opening's for today. Please help pt reschedule appointment if they call back.- Xena CHUNG

## 2024-04-01 ENCOUNTER — TELEPHONE (OUTPATIENT)
Dept: FAMILY MEDICINE | Facility: CLINIC | Age: 84
End: 2024-04-01
Payer: MEDICARE

## 2024-04-01 NOTE — TELEPHONE ENCOUNTER
Called pt and schedule the first available appt that works for the pt. Pt said thanks and hung up.

## 2024-04-03 DIAGNOSIS — J44.89 COPD WITH ASTHMA: ICD-10-CM

## 2024-04-04 NOTE — TELEPHONE ENCOUNTER
No care due was identified.  [facility] Embedded Care Due Messages. Reference number: [id number].   4/03/2024 9:11:12 PM CDT

## 2024-04-05 DIAGNOSIS — I25.10 CORONARY ARTERY DISEASE INVOLVING NATIVE CORONARY ARTERY OF NATIVE HEART WITHOUT ANGINA PECTORIS: ICD-10-CM

## 2024-04-05 DIAGNOSIS — E78.5 HYPERLIPIDEMIA, UNSPECIFIED HYPERLIPIDEMIA TYPE: ICD-10-CM

## 2024-04-05 RX ORDER — FLUTICASONE FUROATE, UMECLIDINIUM BROMIDE AND VILANTEROL TRIFENATATE 200; 62.5; 25 UG/1; UG/1; UG/1
POWDER RESPIRATORY (INHALATION)
Qty: 180 EACH | Refills: 1 | Status: SHIPPED | OUTPATIENT
Start: 2024-04-05

## 2024-04-05 NOTE — TELEPHONE ENCOUNTER
Refill Decision Note   Gayathri Mack  is requesting a refill authorization.  Brief Assessment and Rationale for Refill:  Approve     Medication Therapy Plan:         Pharmacist review requested: Yes   Comments:     Note composed:7:34 AM 04/05/2024

## 2024-04-05 NOTE — TELEPHONE ENCOUNTER
Refill Routing Note   Medication(s) are not appropriate for processing by [facility] for the following reason(s):     DDI not previously overridden by current provider--after initial override, the [facility]ill Center will be able to continue overrides      Drug-drug interactionDuplicate Therapy: albuterol-ipratropium, TRELEGY ELLIPTA     [facility] action(s):  Defer      Medication Therapy Plan: Not previously overridden by provider    Pharmacist review requested: Yes     Appointments  past 12m or future 3m with PCP    Date Provider   Last Visit   10/24/2023 Carlos Deal MD   Next Visit   5/6/2024 Carlos Deal MD   ED visits in past 90 days: 0        Note composed:10:55 PM 04/04/2024

## 2024-04-06 RX ORDER — EZETIMIBE 10 MG/1
TABLET ORAL
Qty: 90 TABLET | Refills: 1 | Status: SHIPPED | OUTPATIENT
Start: 2024-04-06 | End: 2024-05-08 | Stop reason: SDUPTHER

## 2024-04-06 NOTE — TELEPHONE ENCOUNTER
No care due was identified.  [facility] Embedded Care Due Messages. Reference number: [id number].   4/05/2024 9:46:10 PM CDT

## 2024-04-07 NOTE — TELEPHONE ENCOUNTER
Refill Decision Note   Gayathri Mack  is requesting a refill authorization.  Brief Assessment and Rationale for Refill:  Approve     Medication Therapy Plan:        Comments:     Note composed:7:06 PM 04/06/2024

## 2024-05-06 ENCOUNTER — OFFICE VISIT (OUTPATIENT)
Dept: FAMILY MEDICINE | Facility: CLINIC | Age: 84
End: 2024-05-06
Payer: MEDICARE

## 2024-05-06 VITALS
RESPIRATION RATE: 18 BRPM | HEIGHT: 61 IN | WEIGHT: 138 LBS | SYSTOLIC BLOOD PRESSURE: 119 MMHG | BODY MASS INDEX: 26.06 KG/M2 | HEART RATE: 66 BPM | DIASTOLIC BLOOD PRESSURE: 73 MMHG

## 2024-05-06 DIAGNOSIS — M25.551 PAIN OF RIGHT HIP: ICD-10-CM

## 2024-05-06 DIAGNOSIS — Z00.00 MEDICARE ANNUAL WELLNESS VISIT, SUBSEQUENT: Primary | ICD-10-CM

## 2024-05-06 DIAGNOSIS — I25.10 CORONARY ARTERY DISEASE INVOLVING NATIVE CORONARY ARTERY OF NATIVE HEART WITHOUT ANGINA PECTORIS: ICD-10-CM

## 2024-05-06 DIAGNOSIS — S51.012D SKIN TEAR OF LEFT ELBOW WITHOUT COMPLICATION, SUBSEQUENT ENCOUNTER: ICD-10-CM

## 2024-05-06 DIAGNOSIS — K21.9 GASTROESOPHAGEAL REFLUX DISEASE WITHOUT ESOPHAGITIS: ICD-10-CM

## 2024-05-06 DIAGNOSIS — M70.62 TROCHANTERIC BURSITIS OF BOTH HIPS: ICD-10-CM

## 2024-05-06 DIAGNOSIS — I77.810 ASCENDING AORTA DILATATION: ICD-10-CM

## 2024-05-06 DIAGNOSIS — E78.5 HYPERLIPIDEMIA, UNSPECIFIED HYPERLIPIDEMIA TYPE: ICD-10-CM

## 2024-05-06 DIAGNOSIS — J44.9 CHRONIC OBSTRUCTIVE PULMONARY DISEASE, UNSPECIFIED COPD TYPE: ICD-10-CM

## 2024-05-06 DIAGNOSIS — M81.0 AGE-RELATED OSTEOPOROSIS WITHOUT CURRENT PATHOLOGICAL FRACTURE: ICD-10-CM

## 2024-05-06 DIAGNOSIS — R45.82 WORRIES: ICD-10-CM

## 2024-05-06 DIAGNOSIS — F41.1 GAD (GENERALIZED ANXIETY DISORDER): ICD-10-CM

## 2024-05-06 DIAGNOSIS — I10 ESSENTIAL HYPERTENSION: ICD-10-CM

## 2024-05-06 DIAGNOSIS — E78.2 MIXED HYPERLIPIDEMIA: ICD-10-CM

## 2024-05-06 DIAGNOSIS — M70.61 TROCHANTERIC BURSITIS OF BOTH HIPS: ICD-10-CM

## 2024-05-06 PROCEDURE — G0439 PPPS, SUBSEQ VISIT: HCPCS | Mod: ,,, | Performed by: INTERNAL MEDICINE

## 2024-05-06 PROCEDURE — 99999 PR PBB SHADOW E&M-EST. PATIENT-LVL III: CPT | Mod: PBBFAC,,, | Performed by: INTERNAL MEDICINE

## 2024-05-06 PROCEDURE — 99213 OFFICE O/P EST LOW 20 MIN: CPT | Mod: S$PBB,25,, | Performed by: INTERNAL MEDICINE

## 2024-05-06 PROCEDURE — 99213 OFFICE O/P EST LOW 20 MIN: CPT | Mod: PBBFAC,PN | Performed by: INTERNAL MEDICINE

## 2024-05-06 RX ORDER — DICLOFENAC SODIUM 10 MG/G
2 GEL TOPICAL 3 TIMES DAILY
Qty: 100 G | Refills: 3 | Status: SHIPPED | OUTPATIENT
Start: 2024-05-06

## 2024-05-06 RX ORDER — MUPIROCIN 20 MG/G
OINTMENT TOPICAL 2 TIMES DAILY
Qty: 15 G | Refills: 0 | Status: SHIPPED | OUTPATIENT
Start: 2024-05-06

## 2024-05-06 NOTE — PROGRESS NOTES
The following components were reviewed and updated:   Medical history, Family History, Social history,Allergies and Current Medications, Health Risk Assessment, Health Maintenance, Living Situation, Depression Screening.     HRA: patient feels overall is healthy.  Psychosocial and behavioral risks discussed.  BMI - 26   Weight loss discussed.   Diet - well balanced.   ADL: self sufficient in all  Instrumental ADL: patient is able to manage things like their medications and finances.    Memory or cognitive function - Patient has no issues with either   Ambulates normal. No recent falls.  Exercise - walks   Depression screening is negative.  Hearing--+ aids cochlear implant   Vision - no deficits.   Incontinence - none  Opiate Screen- Negative     Preventative health needs discussed and patient was given a printed list of what they have received and what they will need with in the next 5-10 years. Recommendations developed using the USPSTF age appropriate recommendations. Education, counseling, and referrals were provided as needed.   Screening schedule reviewed with patient     Advanced Care directive: not yet I recommend living will & POA   (Ie: pick a person who would make decisions for you if you were unable to make them for yourself, called a health care power of , and what kind of decisions you might make such as use of life sustaining treatments such as ventilators, tube feeding when faced with a life limiting illness recorded on a living will.     I have reviewed and updated the patient's current list of providers.     In addition to the patient's preventative review and discussion today, the patient also has other issues to discuss today with a separate summary of plan below:     Subjective:       Patient ID: Gayathri Mack is a 83 y.o. female.    Chief Complaint: Follow-up and Medicare AWV   Complains of right hip pain outer hip.  Daughters Voltyinn gel was helpful.  Denies fall or trauma.  Putting  pressure laid in right side is tender.    Cyclic - worse with 1st standing gets better after movement.  Never had before       Hip Pain       Gyn: no defers/ MMG:  No defers  Dexa: 2020 get old records//[place] // 2022 Osteoporosis Fosamax -plan this yr is Prolia 2023-24.  L -3.0 & H -2.7   Colonoscopy:  never   Immunizations: Flu:  Yes Tdap: Get old record Pneumovax: 2018 Prevnar 13: 2014 Shingles: Y Covid:Y  Smoker: former - quit 2020      Wellness    due new labs     Hypertension:  controlled Rx Valsartan 160 twice daily   Prev Lisinopril 20          /  Cardio Dr Everett      Anxiety: improved. Rx Lexapro 10 mg.    Sleeping better and less worried.               History of worry your about everything     Glucose intolerance:  improved  A1c 5.9, 5.6 , G 87, 86      Thyroid nodule: s/p biospy. 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               Dr Everett cardio      HLD: goal < 70 // controlled Rx Crestor 10, Zetia 10      COPD emphysema:  Stop smoking 2020// mgmt pulm [place].  Rx Trelegy, Singular   PFT FEV1 60 Trilogy inhaler. 2022: CT + right apex nodule.      Cochlear implant lift -- hearing loss since child               Regular vaccines      Alopecia wears a wig  ____________________________________________________________________________________________________  Assessment & Plan:  1. Medicare annual wellness visit, subsequent    2. Pain of right hip    3. Trochanteric bursitis of both hips  - diclofenac sodium (VOLTAREN) 1 % Gel; Apply 2 g topically 3 (three) times daily.  Dispense: 100 g; Refill: 3    4. Ascending aorta dilatation    5. Skin tear of left elbow without complication, subsequent encounter  - mupirocin (BACTROBAN) 2 % ointment; Apply topically 2 (two) times daily. Prn skin tear  Dispense: 15 g; Refill: 0    6. Age-related osteoporosis without current pathological  fracture  - BASIC METABOLIC PANEL; Future    7. Chronic obstructive pulmonary disease, unspecified COPD type     Medicare annual wellness visit, subsequent    Pain of right hip    Trochanteric bursitis of both hips  -     diclofenac sodium (VOLTAREN) 1 % Gel; Apply 2 g topically 3 (three) times daily.  Dispense: 100 g; Refill: 3    Ascending aorta dilatation    Skin tear of left elbow without complication, subsequent encounter  -     mupirocin (BACTROBAN) 2 % ointment; Apply topically 2 (two) times daily. Prn skin tear  Dispense: 15 g; Refill: 0    Age-related osteoporosis without current pathological fracture  -     BASIC METABOLIC PANEL; Future; Expected date: 05/06/2024    Chronic obstructive pulmonary disease, unspecified COPD type    Other orders  -     denosumab (PROLIA) injection 60 mg        Continue to work on regular exercise, maintain healthy weight, balanced diet. Avoid unhealthy habits: smoking, excessive alcohol intake.     Disclaimer: This note was partly generated using dictation software which may occasionally result in transcription errors  ____________________________________________________________________________________________________  Review of Systems:  Review of Systems   Constitutional:  Negative for appetite change.   HENT:  Negative for nosebleeds.    Eyes:  Negative for pain.   Respiratory:  Negative for choking.    Cardiovascular:  Negative for chest pain.   Gastrointestinal:  Negative for blood in stool.   Genitourinary:  Negative for hematuria.   Musculoskeletal:  Negative for joint swelling.   Skin:  Negative for pallor.   Neurological:  Negative for facial asymmetry.   Hematological:  Does not bruise/bleed easily.   Psychiatric/Behavioral:  Negative for confusion.        Objective:     Wt Readings from Last 3 Encounters:   05/06/24 62.6 kg (138 lb 0.1 oz)   01/24/24 65.5 kg (144 lb 6.4 oz)   10/24/23 69.7 kg (153 lb 12.3 oz)     BP Readings from Last 3 Encounters:   05/06/24 119/73    01/24/24 130/80   10/24/23 138/86       Lab Results   Component Value Date    WBC 5.71 12/18/2023    HGB 13.3 12/18/2023    HCT 41.1 12/18/2023     12/18/2023     12/18/2023    K 3.9 12/18/2023     12/18/2023    ALT 26 12/18/2023    AST 29 12/18/2023    CO2 23 12/18/2023    CREATININE 0.8 12/18/2023    BUN 13 12/18/2023    GLU 86 12/18/2023      Hemoglobin A1C   Date Value Ref Range Status   12/18/2023 5.6 4.0 - 5.6 % Final     Comment:     ADA Screening Guidelines:  5.7-6.4%  Consistent with prediabetes  >or=6.5%  Consistent with diabetes    High levels of fetal hemoglobin interfere with the HbA1C  assay. Heterozygous hemoglobin variants (HbS, HgC, etc)do  not significantly interfere with this assay.   However, presence of multiple variants may affect accuracy.     07/03/2023 5.9 4.5 - 6.2 % Final     Comment:     According to ADA guidelines, hemoglobin A1C <7.0% represents  optimal control in non-pregnant diabetic patients.  Different  metrics may apply to specific populations.   Standards of Medical Care in Diabetes - 2016.    For the purpose of screening for the presence of diabetes:  <5.7%     Consistent with the absence of diabetes  5.7-6.4%  Consistent with increasing risk for diabetes   (prediabetes)  >or=6.5%  Consistent with diabetes    Currently no consensus exists for use of hemoglobin A1C  for diagnosis of diabetes for children.     02/13/2023 5.7 (H) 4.0 - 5.6 % Final     Comment:     ADA Screening Guidelines:  5.7-6.4%  Consistent with prediabetes  >or=6.5%  Consistent with diabetes    High levels of fetal hemoglobin interfere with the HbA1C  assay. Heterozygous hemoglobin variants (HbS, HgC, etc)do  not significantly interfere with this assay.   However, presence of multiple variants may affect accuracy.        Lab Results   Component Value Date    TSH 0.327 (L) 12/18/2023    TSH 0.893 07/03/2023    TSH 0.747 04/19/2023     Lab Results   Component Value Date    FREET4 0.97  12/18/2023    FREET4 1.15 04/19/2023    FREET4 1.30 02/13/2023     Lab Results   Component Value Date    LDLCALC 51.2 (L) 12/18/2023    LDLCALC 84.4 07/03/2023    LDLCALC 76.0 02/13/2023     Lab Results   Component Value Date    TRIG 69 12/18/2023    TRIG 83 07/03/2023    TRIG 95 02/13/2023            Physical Exam  Constitutional:       Appearance: Normal appearance.   HENT:      Head: Normocephalic and atraumatic.   Eyes:      Extraocular Movements: Extraocular movements intact.      Pupils: Pupils are equal, round, and reactive to light.   Cardiovascular:      Rate and Rhythm: Normal rate and regular rhythm.   Pulmonary:      Effort: Pulmonary effort is normal.      Breath sounds: Normal breath sounds.   Abdominal:      General: Bowel sounds are normal.   Musculoskeletal:      Right lower leg: No edema.      Left lower leg: No edema.        Legs:       Comments: Soreness to both trochanteric bursitis right more than left   Neurological:      Mental Status: She is alert and oriented to person, place, and time.   Psychiatric:         Mood and Affect: Mood normal.         Medication List with Changes/Refills   New Medications    DICLOFENAC SODIUM (VOLTAREN) 1 % GEL    Apply 2 g topically 3 (three) times daily.    MUPIROCIN (BACTROBAN) 2 % OINTMENT    Apply topically 2 (two) times daily. Prn skin tear   Current Medications    ALBUTEROL (VENTOLIN HFA) 90 MCG/ACTUATION INHALER    Inhale 2 puffs into the lungs every 4 (four) hours as needed for Wheezing or Shortness of Breath. Rescue    ALBUTEROL-IPRATROPIUM (DUO-NEB) 2.5 MG-0.5 MG/3 ML NEBULIZER SOLUTION    Take 3 mLs by nebulization every 6 (six) hours as needed for Wheezing. Rescue    ALENDRONATE (FOSAMAX) 70 MG TABLET    Take 1 tablet (70 mg total) by mouth every 7 days.    ESCITALOPRAM OXALATE (LEXAPRO) 10 MG TABLET    Take 1 tablet (10 mg total) by mouth once daily. anxiety    ESOMEPRAZOLE (NEXIUM) 40 MG CAPSULE    Take 1 capsule (40 mg total) by mouth before  breakfast.    EZETIMIBE (ZETIA) 10 MG TABLET    TAKE 1 TABLET BY MOUTH ONCE  DAILY    METOPROLOL SUCCINATE (TOPROL-XL) 25 MG 24 HR TABLET    Take 1 tablet (25 mg total) by mouth once daily.    MONTELUKAST (SINGULAIR) 10 MG TABLET    Take 1 tablet (10 mg total) by mouth once daily.    ROSUVASTATIN (CRESTOR) 10 MG TABLET    Take 0.5 tablets (5 mg total) by mouth once daily.    TRELEGY ELLIPTA 200-62.5-25 MCG INHALER    USE 1 INHALATION BY MOUTH ONCE  DAILY AT THE SAME TIME EACH DAY    VALSARTAN (DIOVAN) 160 MG TABLET    Take 1 tablet (160 mg total) by mouth 2 (two) times daily. Blood pressure

## 2024-05-06 NOTE — PATIENT INSTRUCTIONS
Counseling and Referral of Other Preventative  (Italic type indicates deductible and co-insurance are waived)    No orders of the defined types were placed in this encounter.     The following information is provided to all patients.  This information is to help you find resources for any of the problems found today that may be affecting your health:                Living healthy guide: www.[facility].[place].[facility]       Understanding Diabetes: www.diabetes.org       Eating healthy: www.cdc.gov/healthyweight      CDC home safety checklist: www.cdc.gov/steadi/patient.html      Agency on Aging: www.goea.[place].[facility]       Alcoholics anonymous (AA): www.aa.org      Physical Activity: www.aleah.nih.gov/xb6wvnm       Tobacco use: www.quitwithusla.org

## 2024-05-08 RX ORDER — ESOMEPRAZOLE MAGNESIUM 40 MG/1
40 CAPSULE, DELAYED RELEASE ORAL
Qty: 90 CAPSULE | Refills: 3 | Status: SHIPPED | OUTPATIENT
Start: 2024-05-08 | End: 2025-05-08

## 2024-05-08 RX ORDER — ESCITALOPRAM OXALATE 10 MG/1
10 TABLET ORAL DAILY
Qty: 90 TABLET | Refills: 3 | Status: SHIPPED | OUTPATIENT
Start: 2024-05-08 | End: 2025-05-08

## 2024-05-08 RX ORDER — VALSARTAN 160 MG/1
160 TABLET ORAL 2 TIMES DAILY
Qty: 180 TABLET | Refills: 2 | Status: SHIPPED | OUTPATIENT
Start: 2024-05-08

## 2024-05-08 RX ORDER — EZETIMIBE 10 MG/1
10 TABLET ORAL DAILY
Qty: 90 TABLET | Refills: 2 | Status: SHIPPED | OUTPATIENT
Start: 2024-05-08

## 2024-05-08 RX ORDER — METOPROLOL SUCCINATE 25 MG/1
25 TABLET, EXTENDED RELEASE ORAL DAILY
Qty: 90 TABLET | Refills: 3 | Status: SHIPPED | OUTPATIENT
Start: 2024-05-08

## 2024-05-08 RX ORDER — MONTELUKAST SODIUM 10 MG/1
10 TABLET ORAL DAILY
Qty: 90 TABLET | Refills: 3 | Status: SHIPPED | OUTPATIENT
Start: 2024-05-08

## 2024-05-08 RX ORDER — ROSUVASTATIN CALCIUM 5 MG/1
5 TABLET, COATED ORAL DAILY
Qty: 90 TABLET | Refills: 2 | Status: SHIPPED | OUTPATIENT
Start: 2024-05-08

## 2024-06-25 ENCOUNTER — LAB VISIT (OUTPATIENT)
Dept: LAB | Facility: HOSPITAL | Age: 84
End: 2024-06-25
Attending: PHYSICIAN ASSISTANT
Payer: MEDICARE

## 2024-06-25 DIAGNOSIS — E04.2 MULTIPLE THYROID NODULES: ICD-10-CM

## 2024-06-25 DIAGNOSIS — E21.3 HYPERPARATHYROIDISM: ICD-10-CM

## 2024-06-25 DIAGNOSIS — E55.9 HYPOVITAMINOSIS D: ICD-10-CM

## 2024-06-25 LAB
25(OH)D3+25(OH)D2 SERPL-MCNC: 59 NG/ML (ref 30–96)
ALBUMIN SERPL BCP-MCNC: 3.8 G/DL (ref 3.5–5.2)
ALP SERPL-CCNC: 50 U/L (ref 55–135)
ALT SERPL W/O P-5'-P-CCNC: 21 U/L (ref 10–44)
ANION GAP SERPL CALC-SCNC: 7 MMOL/L (ref 8–16)
AST SERPL-CCNC: 21 U/L (ref 10–40)
BILIRUB SERPL-MCNC: 0.4 MG/DL (ref 0.1–1)
BUN SERPL-MCNC: 23 MG/DL (ref 8–23)
CALCIUM SERPL-MCNC: 9.5 MG/DL (ref 8.7–10.5)
CHLORIDE SERPL-SCNC: 107 MMOL/L (ref 95–110)
CO2 SERPL-SCNC: 27 MMOL/L (ref 23–29)
CREAT SERPL-MCNC: 0.8 MG/DL (ref 0.5–1.4)
EST. GFR  (NO RACE VARIABLE): >60 ML/MIN/1.73 M^2
GLUCOSE SERPL-MCNC: 100 MG/DL (ref 70–110)
POTASSIUM SERPL-SCNC: 4.1 MMOL/L (ref 3.5–5.1)
PROT SERPL-MCNC: 6.5 G/DL (ref 6–8.4)
PTH-INTACT SERPL-MCNC: 97.4 PG/ML (ref 9–77)
SODIUM SERPL-SCNC: 141 MMOL/L (ref 136–145)
T4 FREE SERPL-MCNC: 0.89 NG/DL (ref 0.71–1.51)
TSH SERPL DL<=0.005 MIU/L-ACNC: 0.98 UIU/ML (ref 0.4–4)

## 2024-06-25 PROCEDURE — 80053 COMPREHEN METABOLIC PANEL: CPT | Performed by: PHYSICIAN ASSISTANT

## 2024-06-25 PROCEDURE — 83970 ASSAY OF PARATHORMONE: CPT | Performed by: PHYSICIAN ASSISTANT

## 2024-06-25 PROCEDURE — 82306 VITAMIN D 25 HYDROXY: CPT | Performed by: PHYSICIAN ASSISTANT

## 2024-06-25 PROCEDURE — 84439 ASSAY OF FREE THYROXINE: CPT | Performed by: PHYSICIAN ASSISTANT

## 2024-06-25 PROCEDURE — 84443 ASSAY THYROID STIM HORMONE: CPT | Performed by: PHYSICIAN ASSISTANT

## 2024-06-25 PROCEDURE — 36415 COLL VENOUS BLD VENIPUNCTURE: CPT | Performed by: PHYSICIAN ASSISTANT

## 2024-07-10 ENCOUNTER — OFFICE VISIT (OUTPATIENT)
Dept: ENDOCRINOLOGY | Facility: CLINIC | Age: 84
End: 2024-07-10
Payer: MEDICARE

## 2024-07-10 VITALS
HEART RATE: 58 BPM | TEMPERATURE: 98 F | SYSTOLIC BLOOD PRESSURE: 122 MMHG | WEIGHT: 135.81 LBS | BODY MASS INDEX: 25.64 KG/M2 | HEIGHT: 61 IN | OXYGEN SATURATION: 98 % | DIASTOLIC BLOOD PRESSURE: 76 MMHG

## 2024-07-10 DIAGNOSIS — E55.9 HYPOVITAMINOSIS D: ICD-10-CM

## 2024-07-10 DIAGNOSIS — M81.0 OSTEOPOROSIS, UNSPECIFIED OSTEOPOROSIS TYPE, UNSPECIFIED PATHOLOGICAL FRACTURE PRESENCE: ICD-10-CM

## 2024-07-10 DIAGNOSIS — E21.3 HYPERPARATHYROIDISM: ICD-10-CM

## 2024-07-10 DIAGNOSIS — E04.2 MULTIPLE THYROID NODULES: Primary | ICD-10-CM

## 2024-07-10 PROCEDURE — 99214 OFFICE O/P EST MOD 30 MIN: CPT | Mod: PBBFAC,PO | Performed by: PHYSICIAN ASSISTANT

## 2024-07-10 PROCEDURE — 99999 PR PBB SHADOW E&M-EST. PATIENT-LVL IV: CPT | Mod: PBBFAC,,, | Performed by: PHYSICIAN ASSISTANT

## 2024-07-10 PROCEDURE — 99213 OFFICE O/P EST LOW 20 MIN: CPT | Mod: S$PBB,,, | Performed by: PHYSICIAN ASSISTANT

## 2024-07-10 NOTE — PROGRESS NOTES
"CC: Thyroid Nodule    HPI: Gayathri Mack is a 84 y.o. female here for thyroid nodules along with pending conditions listed in the Visit Diagnosis. Diagnosed in in 6/21 on a chest CT performed by her CT. +FHx of thyroid nodules in her daughter and mother.    Thyroid u/s 2/24 showed a 1.9 and 1.4 cm nodule in the left lobe. FNA performed of 1.9 cm nodule was benign 5/22. No sob, dysphagia or voice changes. Pt here w/ daughter.   PTH slightly elevated.    No n/v, abdominal pain or kidney stones.   She has one kidney stone seen on imaging.     Dexa 2/22 shows osteoporosis. On fosamax  11/18-11/23. Her sister has osteoporosis. Taking ca and vd. Walking 1hr daily. Goes to water aerobics 4x weekly.  No falls, fractures. She one steriod injection for RSV a few weeks ago. Taking ca and vd.    PMHx, PSHx: reviewed in epic. COPD.    Social Hx: no ETOH/tobacco use. Former smoker who quit two years ago. She smoked 1 ppd since she was 19.     Wt Readings from Last 6 Encounters:   07/10/24 61.6 kg (135 lb 12.9 oz)   05/06/24 62.6 kg (138 lb 0.1 oz)   01/24/24 65.5 kg (144 lb 6.4 oz)   10/24/23 69.7 kg (153 lb 12.3 oz)   09/20/23 69.5 kg (153 lb 3.5 oz)   04/27/23 71.2 kg (156 lb 13.7 oz)      ROS:   Constitutional: + wt loss 12 lbs  Eyes: No recent visual changes  Cardiovascular: Denies current anginal symptoms  Respiratory: Denies current respiratory difficulty  Gastrointestinal: Denies recent bowel disturbances  GenitoUrinary - No dysuria  Skin: No new skin rash  Neurologic: No focal neurologic complaints  Musculoskeletal: no joint pain  Endocrine: no polyphagia, polydipsia or polyuria  Remainder ROS negative     /76 (BP Location: Right arm, Patient Position: Sitting, BP Method: Medium (Manual))   Pulse (!) 58   Temp 98 °F (36.7 °C) (Oral)   Ht 5' 1" (1.549 m)   Wt 61.6 kg (135 lb 12.9 oz)   SpO2 98%   BMI 25.66 kg/m²      Personally reviewed labs below:    Lab Results   Component Value Date    TSH 0.982 06/25/2024 "    FREET4 0.89 06/25/2024        Chemistry        Component Value Date/Time     06/25/2024 1057    K 4.1 06/25/2024 1057     06/25/2024 1057    CO2 27 06/25/2024 1057    BUN 23 06/25/2024 1057    CREATININE 0.8 06/25/2024 1057     06/25/2024 1057        Component Value Date/Time    CALCIUM 9.5 06/25/2024 1057    ALKPHOS 50 (L) 06/25/2024 1057    AST 21 06/25/2024 1057    ALT 21 06/25/2024 1057    BILITOT 0.4 06/25/2024 1057    ESTGFRAFRICA >60 05/09/2022 0525    EGFRNONAA >60 05/09/2022 0525         Lab Results   Component Value Date    HGBA1C 5.6 12/18/2023    HGBA1C 5.9 07/03/2023    HGBA1C 5.7 (H) 02/13/2023      US THYROID. A complete ultrasound examination of the thyroid was done with grayscale and color flow Doppler imaging where appropriate.     COMPARISON:  Most recent ultrasound from February 21, 2022     FINDINGS:  RIGHT lobe: The right lobe of the thyroid is normal in size and mildly heterogeneous in background echogenicity measuring 5.9 x 2.6 x 2.1 cm. The background vascularity of the thyroid gland is normal.     LEFT lobe: The left lobe of the thyroid is normal in size and mildly heterogeneous in background echogenicity measuring 4.7 x 2.4 x 2.3 cm. The background vascularity of the thyroid gland is normal.     ISTHMUS: The thyroid isthmus measures 2 mm.     LYMPH NODES: No gross lymphadenopathy is seen in the central neck.     THYROID NODULES:  NODULE #1: Right lower lobe  Size: 9 x 8 x 7 mm, previously 8 x 8 x 7 mm  Composition: solid or almost completely solid (2 points)  Echogenicity: hyperechoic or isoechoic (1 point)  Shape: wider-than-tall (0 points)  Margin: ill-defined (0 points)  Echogenic Foci: none or large comet-tail artifacts (0 points)  Nodule TI-RADS score: TR3 (3 pts). less than 1.5 cm; no follow-up.     NODULE #2: Left mid lobe  Size: 19 x 15 x 15 mm, previously 19 x 15 x 15 mm  Composition: solid or almost completely solid (2 points)  Echogenicity: hyperechoic or  isoechoic (1 point)  Shape: wider-than-tall (0 points)  Margin: smooth (0 points)  Echogenic Foci: none or large comet-tail artifacts (0 points)  Nodule TI-RADS score: TR3 (3 pts). Mildly suspicious. 1.5-2.5 cm follow up.     NODULE #3: Left lower lobe  Size: 14 x 13 x 9 mm, previously 14 x 11 x 9 mm  Composition: solid or almost completely solid (2 points)  Echogenicity: hyperechoic or isoechoic (1 point)  Shape: wider-than-tall (0 points)  Margin: ill-defined (0 points)  Echogenic Foci: none or large comet-tail artifacts (0 points)  Nodule TI-RADS score: TR3 (3 pts). less than 1.5 cm; no follow-up.     NODULE #4: Adjacent left lower lobe  Size: 11 x 8 x 8 mm, previously 10 x 7 x 6 mm  Composition: solid or almost completely solid (2 points)  Echogenicity: hyperechoic or isoechoic (1 point)  Shape: wider-than-tall (0 points)  Margin: ill-defined (0 points)  Echogenic Foci: none or large comet-tail artifacts (0 points)  Nodule TI-RADS score: TR3 (3 pts). less than 1.5 cm; no follow-up.     IMPRESSION:  Heterogeneous multinodular thyroid gland with index nodule as outlined above, without adverse interval change from the previous exam.    PE:  GENERAL: elderly female, well developed, well nourished  NECK: Supple neck, normal thyroid. No bruit  RESPIRATORY: Normal effort,   NEURO: steady gait, CN ll-Xll grossly intact  PSYCH: normal mood and affect    Assessment/Plan:     1. Multiple thyroid nodules  US Thyroid      2. Osteoporosis, unspecified osteoporosis type, unspecified pathological fracture presence        3. Hypovitaminosis D        4. Hyperparathyroidism  Comprehensive Metabolic Panel    PTH, Intact    Calcium, Ionized    Calcium, Timed Urine [facility]; 24 Hours    Creatinine, urine, timed 24 Hours        Nodular thyroid disease-stable-FNA benign. Repeat u/s.  The 1.4 cm nodule was not identified during the FNA.   Postmenopausal-DEXA scan 2/26. Start prolia q 6 mths. Will review dexa to determine tx or continue  holiday. Continue ca and vd. Also, participate in weight bearing and resistance exercises for 40 min 4x weekly to maintain your bone density.   Hypovitaminosis f-lgcuiv-cdpmzjoi vd intake.  Hyperparathyroidism-slightly elevated. Ca is wnl. Check urine calcium.     FOLLOWUP  24 HR URINES  F/u in 2/25 w/ labs-PTH, ICA, CMP, VD & THYROID U/S

## 2024-07-12 ENCOUNTER — LAB VISIT (OUTPATIENT)
Dept: LAB | Facility: HOSPITAL | Age: 84
End: 2024-07-12
Attending: PHYSICIAN ASSISTANT
Payer: MEDICARE

## 2024-07-12 DIAGNOSIS — E21.3 HYPERPARATHYROIDISM: ICD-10-CM

## 2024-07-12 PROCEDURE — 82570 ASSAY OF URINE CREATININE: CPT | Performed by: PHYSICIAN ASSISTANT

## 2024-07-12 PROCEDURE — 82340 ASSAY OF CALCIUM IN URINE: CPT | Performed by: PHYSICIAN ASSISTANT

## 2024-07-13 LAB
CALCIUM 24H UR-MRATE: 1 MG/HR (ref 4–12)
CALCIUM UR-MCNC: 3.3 MG/DL (ref 0–15)
CALCIUM URINE (MG/SPEC): 35 MG/SPEC
CREAT 24H UR-MRATE: 25.8 MG/HR (ref 40–75)
CREAT UR-MCNC: 59 MG/DL (ref 15–325)
CREATININE, URINE (MG/SPEC): 619.5 MG/SPEC
URINE COLLECTION DURATION: 24 HR
URINE COLLECTION DURATION: 24 HR
URINE VOLUME: 1050 ML
URINE VOLUME: 1050 ML

## 2024-07-22 DIAGNOSIS — J44.89 COPD WITH ASTHMA: ICD-10-CM

## 2024-07-23 ENCOUNTER — INFUSION (OUTPATIENT)
Dept: INFUSION THERAPY | Facility: HOSPITAL | Age: 84
End: 2024-07-23
Attending: PHYSICIAN ASSISTANT
Payer: MEDICARE

## 2024-07-23 VITALS
RESPIRATION RATE: 18 BRPM | WEIGHT: 135.81 LBS | BODY MASS INDEX: 25.64 KG/M2 | DIASTOLIC BLOOD PRESSURE: 89 MMHG | HEART RATE: 63 BPM | OXYGEN SATURATION: 97 % | TEMPERATURE: 97 F | SYSTOLIC BLOOD PRESSURE: 169 MMHG | HEIGHT: 61 IN

## 2024-07-23 DIAGNOSIS — M81.0 AGE-RELATED OSTEOPOROSIS WITHOUT CURRENT PATHOLOGICAL FRACTURE: Primary | ICD-10-CM

## 2024-07-23 PROCEDURE — 63600175 PHARM REV CODE 636 W HCPCS: Mod: JZ,JG | Performed by: PHYSICIAN ASSISTANT

## 2024-07-23 PROCEDURE — 96372 THER/PROPH/DIAG INJ SC/IM: CPT

## 2024-07-23 RX ORDER — FLUTICASONE FUROATE, UMECLIDINIUM BROMIDE AND VILANTEROL TRIFENATATE 200; 62.5; 25 UG/1; UG/1; UG/1
POWDER RESPIRATORY (INHALATION)
Qty: 180 EACH | Refills: 3 | Status: SHIPPED | OUTPATIENT
Start: 2024-07-23

## 2024-07-23 RX ADMIN — DENOSUMAB 60 MG: 60 INJECTION SUBCUTANEOUS at 08:07

## 2024-07-23 NOTE — TELEPHONE ENCOUNTER
Refill Routing Note   Medication(s) are not appropriate for processing by [facility] for the following reason(s):        Drug-drug interaction    [facility] action(s):  Defer      Medication Therapy Plan: DUO-NEB    Pharmacist review requested: Yes     Appointments  past 12m or future 3m with PCP    Date Provider   Last Visit   5/6/2024 Carlos Deal MD   Next Visit   11/11/2024 Carlos Deal MD   ED visits in past 90 days: 0        Note composed:8:27 AM 07/23/2024

## 2024-07-23 NOTE — TELEPHONE ENCOUNTER
No care due was identified.  [facility] Embedded Care Due Messages. Reference number: [id number].   7/22/2024 9:15:12 PM CDT

## 2024-07-23 NOTE — TELEPHONE ENCOUNTER
Refill Decision Note   Gayathri Mack  is requesting a refill authorization.  Brief Assessment and Rationale for Refill:  Approve     Medication Therapy Plan:         Pharmacist review requested: Yes   Extended chart review required: Yes   Comments:     Note composed:12:17 PM 07/23/2024

## 2025-01-09 ENCOUNTER — TELEPHONE (OUTPATIENT)
Dept: FAMILY MEDICINE | Facility: CLINIC | Age: 85
End: 2025-01-09
Payer: MEDICARE

## 2025-01-09 NOTE — TELEPHONE ENCOUNTER
Rt pt call, about rescheduling appointment and needing labs done. No answer left vm to return our call.

## 2025-01-09 NOTE — TELEPHONE ENCOUNTER
----- Message from Jayla sent at 1/9/2025 11:27 AM CST -----  Regarding: Appt. Request  Type:Appointment Request    Caller is requesting an appointment:    Name of Caller:pt daughter Beatrice     Symptoms:needs to reschedule 01/13 appt     Would the patient rather a call back or a response via [facility]? Call back     Best Call Back Number:[phone] daughter beatrice       Additional Information: pt needs an appt for early feb, also needs blood work scheduled and refill, please call to advise, Thank You.

## 2025-01-13 ENCOUNTER — OFFICE VISIT (OUTPATIENT)
Dept: FAMILY MEDICINE | Facility: CLINIC | Age: 85
End: 2025-01-13
Payer: MEDICARE

## 2025-01-13 ENCOUNTER — TELEPHONE (OUTPATIENT)
Dept: FAMILY MEDICINE | Facility: CLINIC | Age: 85
End: 2025-01-13

## 2025-01-13 VITALS
HEIGHT: 61 IN | BODY MASS INDEX: 25.07 KG/M2 | OXYGEN SATURATION: 96 % | HEART RATE: 64 BPM | SYSTOLIC BLOOD PRESSURE: 112 MMHG | DIASTOLIC BLOOD PRESSURE: 80 MMHG | WEIGHT: 132.81 LBS

## 2025-01-13 DIAGNOSIS — E21.3 HYPERPARATHYROIDISM, UNSPECIFIED: ICD-10-CM

## 2025-01-13 DIAGNOSIS — R91.1 SOLITARY PULMONARY NODULE: ICD-10-CM

## 2025-01-13 DIAGNOSIS — I25.10 CORONARY ATHEROSCLEROSIS OF NATIVE CORONARY ARTERY: ICD-10-CM

## 2025-01-13 DIAGNOSIS — R79.89 ABNORMAL COMPLETE BLOOD COUNT: ICD-10-CM

## 2025-01-13 DIAGNOSIS — M25.541 ARTHRALGIA OF BOTH HANDS: ICD-10-CM

## 2025-01-13 DIAGNOSIS — I77.810 ASCENDING AORTA DILATATION: ICD-10-CM

## 2025-01-13 DIAGNOSIS — R73.02 GLUCOSE INTOLERANCE (IMPAIRED GLUCOSE TOLERANCE): ICD-10-CM

## 2025-01-13 DIAGNOSIS — M25.542 ARTHRALGIA OF BOTH HANDS: ICD-10-CM

## 2025-01-13 DIAGNOSIS — Z23 IMMUNIZATION DUE: ICD-10-CM

## 2025-01-13 DIAGNOSIS — J44.89 COPD WITH ASTHMA: ICD-10-CM

## 2025-01-13 DIAGNOSIS — M70.61 TROCHANTERIC BURSITIS OF BOTH HIPS: ICD-10-CM

## 2025-01-13 DIAGNOSIS — I10 PRIMARY HYPERTENSION: Primary | ICD-10-CM

## 2025-01-13 DIAGNOSIS — E78.2 MIXED HYPERLIPIDEMIA: ICD-10-CM

## 2025-01-13 DIAGNOSIS — Z00.00 MEDICARE ANNUAL WELLNESS VISIT, SUBSEQUENT: ICD-10-CM

## 2025-01-13 DIAGNOSIS — M70.62 TROCHANTERIC BURSITIS OF BOTH HIPS: ICD-10-CM

## 2025-01-13 DIAGNOSIS — I25.10 CORONARY ARTERY DISEASE INVOLVING NATIVE CORONARY ARTERY OF NATIVE HEART WITHOUT ANGINA PECTORIS: ICD-10-CM

## 2025-01-13 DIAGNOSIS — J44.9 CHRONIC OBSTRUCTIVE PULMONARY DISEASE, UNSPECIFIED COPD TYPE: ICD-10-CM

## 2025-01-13 DIAGNOSIS — R91.1 SOLITARY LUNG NODULE: ICD-10-CM

## 2025-01-13 PROCEDURE — 99999 PR PBB SHADOW E&M-EST. PATIENT-LVL IV: CPT | Mod: PBBFAC,,, | Performed by: INTERNAL MEDICINE

## 2025-01-13 PROCEDURE — 99214 OFFICE O/P EST MOD 30 MIN: CPT | Mod: S$PBB,,, | Performed by: INTERNAL MEDICINE

## 2025-01-13 PROCEDURE — G2211 COMPLEX E/M VISIT ADD ON: HCPCS | Mod: S$PBB,,, | Performed by: INTERNAL MEDICINE

## 2025-01-13 PROCEDURE — 99214 OFFICE O/P EST MOD 30 MIN: CPT | Mod: PBBFAC,PN | Performed by: INTERNAL MEDICINE

## 2025-01-13 RX ORDER — FLUTICASONE FUROATE, UMECLIDINIUM BROMIDE AND VILANTEROL TRIFENATATE 200; 62.5; 25 UG/1; UG/1; UG/1
1 POWDER RESPIRATORY (INHALATION) DAILY
Qty: 30 EACH | Refills: 11 | Status: SHIPPED | OUTPATIENT
Start: 2025-01-13

## 2025-01-13 RX ORDER — MONTELUKAST SODIUM 10 MG/1
10 TABLET ORAL DAILY
Qty: 90 TABLET | Refills: 3 | Status: SHIPPED | OUTPATIENT
Start: 2025-01-13

## 2025-01-13 RX ORDER — DICLOFENAC SODIUM 30 MG/G
GEL TOPICAL 2 TIMES DAILY PRN
Qty: 100 G | Refills: 3 | Status: SHIPPED | OUTPATIENT
Start: 2025-01-13

## 2025-01-13 RX ORDER — TETANUS TOXOID, REDUCED DIPHTHERIA TOXOID AND ACELLULAR PERTUSSIS VACCINE, ADSORBED 5; 2.5; 8; 8; 2.5 [IU]/.5ML; [IU]/.5ML; UG/.5ML; UG/.5ML; UG/.5ML
0.5 SUSPENSION INTRAMUSCULAR ONCE
Qty: 0.5 ML | Refills: 0 | Status: SHIPPED | OUTPATIENT
Start: 2025-01-13 | End: 2025-01-13

## 2025-01-13 NOTE — TELEPHONE ENCOUNTER
----- Message from Minal sent at 1/13/2025 12:10 PM CST -----  Regarding: Follow up appt  Good afternoon,     Please reach out to this pt to schedule a follow up appointment. Pt # [phone]    They were seen today 1/13 and sent out during lunch.     Thank you!

## 2025-01-13 NOTE — PROGRESS NOTES
"Subjective:       Patient ID: Gayathri Mack is a 84 y.o. female.    Chief Complaint: Annual Exam   HPI     History of Present Illness            Gyn: no defers/ MMG:  No defers  Dexa: 2022 Osteoporosis Rx w Endo Prolia 2023-24.  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      Cardio told her she could hold her statin.  Still taking Zetia    Wasn't having any issues w it     Hand and hip pain using Volarten gel 1 %- would like higher dose. Not working as well.     Eating well walking 2 miles daily     Mixed HLD/Hypertension:  controlled Rx Valsartan 160 bid, Metoprolol 25, Zetia 10   24" off Crestor 10, Prev Lisinopril 20            Mgmt Cardio Dr Everett     Ascending thoracic aorta:  ectatic at 4.3 cm //Echo Q 6 M w cardio   calcification of the thoracic aorta, coronary arteries.               Mgmt Dr Everett cardio    Glucose intolerance: History A1c 5.9.  Improved with diet.    Anxiety: controlled Rx Lexapro 10 mg.    Sleeping better and less worried.               History of worry your about everything     Glucose intolerance:  improved  A1c 5.9, 5.6 , G 87, 86      Thyroid nodule: s/p biospy. FNA 2022  negative               Mgmt Endo NP         COPD emphysema/Asthma: per Pulm controlled. Rx Trelegy, Singular    Stop smoking 2020  mgmt pulm [place].  Lost to follow-up needs to make appointment  PFT FEV1 60 Trelegy Inhaler. 2022: CT + right apex nodule didn't get rc      ____________________________________________________________________________________________________  Assessment & Plan:  1. Primary hypertension    2. Hyperparathyroidism, unspecified    3. Glucose intolerance (impaired glucose " tolerance)  - Hemoglobin A1C; Future    4. Mixed hyperlipidemia  - LIPID PANEL; Future    5. COPD with asthma  - fluticasone-umeclidin-vilanter (TRELEGY ELLIPTA) 200-62.5-25 mcg inhaler; Inhale 1 puff into the lungs once daily. Wants one monthly only  Dispense: 30 each; Refill: 11    6. Chronic obstructive pulmonary disease, unspecified COPD type  - CT Chest Without Contrast; Future  - montelukast (SINGULAIR) 10 mg tablet; Take 1 tablet (10 mg total) by mouth once daily.  Dispense: 90 tablet; Refill: 3    7. Ascending aorta dilatation    8. Trochanteric bursitis of both hips  - diclofenac sodium (SOLARAZE) 3 % gel; Apply topically 2 (two) times daily as needed (hand pain).  Dispense: 100 g; Refill: 3    9. Arthralgia of both hands  - diclofenac sodium (SOLARAZE) 3 % gel; Apply topically 2 (two) times daily as needed (hand pain).  Dispense: 100 g; Refill: 3    10. Solitary lung nodule  - CT Chest Without Contrast; Future    11. Immunization due  - diphth,pertus,acell,,tetanus (BOOSTRIX TDAP) 2.5-8-5 Lf-mcg-Lf/0.5mL Syrg injection; Inject 0.5 mLs into the muscle once. for 1 dose  Dispense: 0.5 mL; Refill: 0    12. Solitary pulmonary nodule  - CT Chest Without Contrast; Future    13. Coronary artery disease involving native coronary artery of native heart without angina pectoris    14. Abnormal complete blood count  - CBC Without Differential; Future    15. Medicare annual wellness visit, subsequent  - LIPID PANEL; Future  - CBC Without Differential; Future  - Hemoglobin A1C; Future    16. Coronary atherosclerosis of native coronary artery     Primary hypertension    Hyperparathyroidism, unspecified    Glucose intolerance (impaired glucose tolerance)  -     Hemoglobin A1C; Future; Expected date: 01/13/2025    Mixed hyperlipidemia  -     LIPID PANEL; Future; Expected date: 01/13/2025    COPD with asthma  -     fluticasone-umeclidin-vilanter (TRELEGY ELLIPTA) 200-62.5-25 mcg inhaler; Inhale 1 puff into the lungs once  daily. Wants one monthly only  Dispense: 30 each; Refill: 11    Chronic obstructive pulmonary disease, unspecified COPD type  -     CT Chest Without Contrast; Future; Expected date: 04/15/2025  -     montelukast (SINGULAIR) 10 mg tablet; Take 1 tablet (10 mg total) by mouth once daily.  Dispense: 90 tablet; Refill: 3    Ascending aorta dilatation    Trochanteric bursitis of both hips  -     diclofenac sodium (SOLARAZE) 3 % gel; Apply topically 2 (two) times daily as needed (hand pain).  Dispense: 100 g; Refill: 3    Arthralgia of both hands  -     diclofenac sodium (SOLARAZE) 3 % gel; Apply topically 2 (two) times daily as needed (hand pain).  Dispense: 100 g; Refill: 3    Solitary lung nodule  -     CT Chest Without Contrast; Future; Expected date: 04/15/2025    Immunization due  -     diphth,pertus,acell,,tetanus (BOOSTRIX TDAP) 2.5-8-5 Lf-mcg-Lf/0.5mL Syrg injection; Inject 0.5 mLs into the muscle once. for 1 dose  Dispense: 0.5 mL; Refill: 0    Solitary pulmonary nodule  -     CT Chest Without Contrast; Future; Expected date: 04/15/2025    Coronary artery disease involving native coronary artery of native heart without angina pectoris    Abnormal complete blood count  -     CBC Without Differential; Future; Expected date: 01/13/2025    Medicare annual wellness visit, subsequent  -     LIPID PANEL; Future; Expected date: 01/13/2025  -     CBC Without Differential; Future; Expected date: 01/13/2025  -     Hemoglobin A1C; Future; Expected date: 01/13/2025    Coronary atherosclerosis of native coronary artery        Continue to work on maintain healthy weight, balanced diet. Avoid unhealthy habits: smoking/vaping, excessive alcohol intake.     Recommend diet exercise:  High protein, low fat, low carb diet - calories women under <1200 & men <1800, carbs <100 G, protein 50-60 G daily. Protein supplements to replace one meal.  Keep all beverages < 10 calories per serving. Keep snacks < 100 calories.   Recommend  exercise 160 minutes per week, combo of cardio and weight/strength training.     Visit today included increased complexity associated with the care of the episodic problem addressed and managing the longitudinal care of the patient due to the serious and/or complex managed problem(s). HTN      Disclaimer: This note was partly generated using dictation software which may occasionally result in transcription errors  ____________________________________________________________________________________________________  Review of Systems:  Review of Systems      Negative     Objective:     Wt Readings from Last 3 Encounters:   01/13/25 60.2 kg (132 lb 13.2 oz)   07/23/24 61.6 kg (135 lb 12.8 oz)   07/10/24 61.6 kg (135 lb 12.9 oz)     BP Readings from Last 3 Encounters:   01/13/25 112/80   07/23/24 (!) 169/89   07/10/24 122/76       Lab Results   Component Value Date    WBC 5.71 12/18/2023    HGB 13.3 12/18/2023    HCT 41.1 12/18/2023     12/18/2023     06/25/2024    K 4.1 06/25/2024     06/25/2024    ALT 21 06/25/2024    AST 21 06/25/2024    CO2 27 06/25/2024    CREATININE 0.8 06/25/2024    BUN 23 06/25/2024     06/25/2024      Hemoglobin A1C   Date Value Ref Range Status   12/18/2023 5.6 4.0 - 5.6 % Final     Comment:     ADA Screening Guidelines:  5.7-6.4%  Consistent with prediabetes  >or=6.5%  Consistent with diabetes    High levels of fetal hemoglobin interfere with the HbA1C  assay. Heterozygous hemoglobin variants (HbS, HgC, etc)do  not significantly interfere with this assay.   However, presence of multiple variants may affect accuracy.     07/03/2023 5.9 4.5 - 6.2 % Final     Comment:     According to ADA guidelines, hemoglobin A1C <7.0% represents  optimal control in non-pregnant diabetic patients.  Different  metrics may apply to specific populations.   Standards of Medical Care in Diabetes - 2016.    For the purpose of screening for the presence of diabetes:  <5.7%     Consistent  with the absence of diabetes  5.7-6.4%  Consistent with increasing risk for diabetes   (prediabetes)  >or=6.5%  Consistent with diabetes    Currently no consensus exists for use of hemoglobin A1C  for diagnosis of diabetes for children.     02/13/2023 5.7 (H) 4.0 - 5.6 % Final     Comment:     ADA Screening Guidelines:  5.7-6.4%  Consistent with prediabetes  >or=6.5%  Consistent with diabetes    High levels of fetal hemoglobin interfere with the HbA1C  assay. Heterozygous hemoglobin variants (HbS, HgC, etc)do  not significantly interfere with this assay.   However, presence of multiple variants may affect accuracy.        Lab Results   Component Value Date    TSH 0.982 06/25/2024    TSH 0.327 (L) 12/18/2023    TSH 0.893 07/03/2023     Lab Results   Component Value Date    FREET4 0.89 06/25/2024    FREET4 0.97 12/18/2023    FREET4 1.15 04/19/2023     Lab Results   Component Value Date    LDLCALC 51.2 (L) 12/18/2023    LDLCALC 84.4 07/03/2023    LDLCALC 76.0 02/13/2023     Lab Results   Component Value Date    TRIG 69 12/18/2023    TRIG 83 07/03/2023    TRIG 95 02/13/2023            Physical Exam      Constitutional:       Appearance: Normal appearance.   HENT:      Head: Normocephalic and atraumatic.   Eyes:      Extraocular Movements: Extraocular movements intact.      Pupils: Pupils are equal, round, and reactive to light.   Cardiovascular:      Rate and Rhythm: Normal rate.   Pulmonary:      Effort: Pulmonary effort is normal.   Neurological:      Mental Status: She is alert and oriented to person, place, and time.   Psychiatric:         Mood and Affect: Mood normal.     Medication List with Changes/Refills   New Medications    DICLOFENAC SODIUM (SOLARAZE) 3 % GEL    Apply topically 2 (two) times daily as needed (hand pain).    DIPHTH,PERTUS,ACELL,,TETANUS (BOOSTRIX TDAP) 2.5-8-5 LF-MCG-LF/0.5ML SYRG INJECTION    Inject 0.5 mLs into the muscle once. for 1 dose   Current Medications    ALBUTEROL (VENTOLIN HFA) 90  MCG/ACTUATION INHALER    Inhale 2 puffs into the lungs every 4 (four) hours as needed for Wheezing or Shortness of Breath. Rescue    ALBUTEROL-IPRATROPIUM (DUO-NEB) 2.5 MG-0.5 MG/3 ML NEBULIZER SOLUTION    Take 3 mLs by nebulization every 6 (six) hours as needed for Wheezing. Rescue    DICLOFENAC SODIUM (VOLTAREN) 1 % GEL    Apply 2 g topically 3 (three) times daily.    ESCITALOPRAM OXALATE (LEXAPRO) 10 MG TABLET    Take 1 tablet (10 mg total) by mouth once daily. anxiety    ESOMEPRAZOLE (NEXIUM) 40 MG CAPSULE    Take 1 capsule (40 mg total) by mouth before breakfast.    EZETIMIBE (ZETIA) 10 MG TABLET    Take 1 tablet (10 mg total) by mouth once daily.    METOPROLOL SUCCINATE (TOPROL-XL) 25 MG 24 HR TABLET    Take 1 tablet (25 mg total) by mouth once daily.    VALSARTAN (DIOVAN) 160 MG TABLET    Take 1 tablet (160 mg total) by mouth 2 (two) times daily. Blood pressure   Changed and/or Refilled Medications    Modified Medication Previous Medication    FLUTICASONE-UMECLIDIN-VILANTER (TRELEGY ELLIPTA) 200-62.5-25 MCG INHALER TRELEGY ELLIPTA 200-62.5-25 mcg inhaler       Inhale 1 puff into the lungs once daily. Wants one monthly only    USE 1 INHALATION BY MOUTH ONCE  DAILY AT THE SAME TIME EACH DAY    MONTELUKAST (SINGULAIR) 10 MG TABLET montelukast (SINGULAIR) 10 mg tablet       Take 1 tablet (10 mg total) by mouth once daily.    Take 1 tablet (10 mg total) by mouth once daily.   Discontinued Medications    MUPIROCIN (BACTROBAN) 2 % OINTMENT    Apply topically 2 (two) times daily. Prn skin tear    ROSUVASTATIN (CRESTOR) 5 MG TABLET    Take 1 tablet (5 mg total) by mouth once daily.

## 2025-01-13 NOTE — TELEPHONE ENCOUNTER
Tried to reach pt dtr. No answer. Left message for pt to call back. Will sched f/u appt when they call back

## 2025-02-05 ENCOUNTER — HOSPITAL ENCOUNTER (OUTPATIENT)
Dept: RADIOLOGY | Facility: HOSPITAL | Age: 85
Discharge: HOME | End: 2025-02-05
Attending: PHYSICIAN ASSISTANT
Payer: MEDICARE

## 2025-02-05 DIAGNOSIS — E04.2 MULTIPLE THYROID NODULES: ICD-10-CM

## 2025-02-05 PROCEDURE — 76536 US EXAM OF HEAD AND NECK: CPT | Mod: TC

## 2025-02-05 PROCEDURE — 76536 US EXAM OF HEAD AND NECK: CPT | Mod: 26,,, | Performed by: RADIOLOGY

## 2025-02-07 ENCOUNTER — HOSPITAL ENCOUNTER (OUTPATIENT)
Dept: RADIOLOGY | Facility: HOSPITAL | Age: 85
Discharge: HOME | End: 2025-02-07
Attending: INTERNAL MEDICINE
Payer: MEDICARE

## 2025-02-07 DIAGNOSIS — J44.9 CHRONIC OBSTRUCTIVE PULMONARY DISEASE, UNSPECIFIED COPD TYPE: ICD-10-CM

## 2025-02-07 DIAGNOSIS — R91.1 SOLITARY PULMONARY NODULE: ICD-10-CM

## 2025-02-07 DIAGNOSIS — R91.1 SOLITARY LUNG NODULE: ICD-10-CM

## 2025-02-07 PROCEDURE — 71250 CT THORAX DX C-: CPT | Mod: 26,,, | Performed by: RADIOLOGY

## 2025-02-07 PROCEDURE — 71250 CT THORAX DX C-: CPT | Mod: TC,PO

## 2025-02-09 DIAGNOSIS — E78.2 MIXED HYPERLIPIDEMIA: ICD-10-CM

## 2025-02-10 RX ORDER — ROSUVASTATIN CALCIUM 5 MG/1
5 TABLET, COATED ORAL
Qty: 90 TABLET | Refills: 2 | Status: SHIPPED | OUTPATIENT
Start: 2025-02-10

## 2025-02-10 NOTE — TELEPHONE ENCOUNTER
Refill Routing Note   Medication(s) are not appropriate for processing by [facility] for the following reason(s):        No active prescription written by provider    [facility] action(s):  Defer               Appointments  past 12m or future 3m with PCP    Date Provider   Last Visit   1/13/2025 Carlos Deal MD   Next Visit   Visit date not found Carlos Deal MD   ED visits in past 90 days: 0        Note composed:9:37 AM 02/10/2025

## 2025-02-10 NOTE — TELEPHONE ENCOUNTER
No care due was identified.  [facility] Embedded Care Due Messages. Reference number: [id number].   2/09/2025 9:10:21 PM CST

## 2025-02-12 ENCOUNTER — OFFICE VISIT (OUTPATIENT)
Dept: ENDOCRINOLOGY | Facility: CLINIC | Age: 85
End: 2025-02-12
Payer: MEDICARE

## 2025-02-12 VITALS
WEIGHT: 132.5 LBS | HEIGHT: 61 IN | BODY MASS INDEX: 25.02 KG/M2 | DIASTOLIC BLOOD PRESSURE: 84 MMHG | SYSTOLIC BLOOD PRESSURE: 118 MMHG | TEMPERATURE: 98 F | OXYGEN SATURATION: 94 % | HEART RATE: 72 BPM

## 2025-02-12 DIAGNOSIS — E04.2 MULTIPLE THYROID NODULES: Primary | ICD-10-CM

## 2025-02-12 DIAGNOSIS — E21.3 HYPERPARATHYROIDISM: ICD-10-CM

## 2025-02-12 DIAGNOSIS — Z78.0 POSTMENOPAUSAL: ICD-10-CM

## 2025-02-12 DIAGNOSIS — E78.2 MIXED HYPERLIPIDEMIA: ICD-10-CM

## 2025-02-12 DIAGNOSIS — E55.9 HYPOVITAMINOSIS D: ICD-10-CM

## 2025-02-12 PROCEDURE — 99999 PR PBB SHADOW E&M-EST. PATIENT-LVL III: CPT | Mod: PBBFAC,,, | Performed by: PHYSICIAN ASSISTANT

## 2025-02-12 PROCEDURE — 99213 OFFICE O/P EST LOW 20 MIN: CPT | Mod: S$PBB,,, | Performed by: PHYSICIAN ASSISTANT

## 2025-02-12 PROCEDURE — 99213 OFFICE O/P EST LOW 20 MIN: CPT | Mod: PBBFAC,PO | Performed by: PHYSICIAN ASSISTANT

## 2025-02-12 NOTE — PROGRESS NOTES
CC: Thyroid Nodule    HPI: Gayathri Mack is a 84 y.o. female here for thyroid nodules along with pending conditions listed in the Visit Diagnosis. Diagnosed in in 6/21 on a chest CT performed by her CT. +FHx of thyroid nodules in her daughter and mother.    Thyroid u/s 2/25 showed a 1.9 and 1.4 cm nodule in the left lobe. FNA performed of 1.9 cm nodule was benign 5/22. No sob, dysphagia or voice changes. Pt here w/ daughter.   PTH slightly elevated.    No n/v, abdominal pain or kidney stones.   She has one kidney stone seen on imaging.     Dexa 2/24 shows osteoporosis. On fosamax  11/18-11/23. On prolia since 7/24. Her sister has osteoporosis. Taking ca and vd. Walking 1 mile 2x daily. Goes to water aerobics 4x weekly.  No falls, fractures. She one steriod injection for RSV this year.     Hyperparathyroidism     Latest Reference Range & Units 07/12/24 12:35   Calcium, Urine 0.0 - 15.0 mg/dL 3.3   Calcium, Timed Urine 4 - 12 mg/Hr 1 (L)   Urine Calcium Absolute  mg/Spec 35   Urine Creatinine Absolute mg/Spec 619.5   Creatinine, Timed Urine 40.0 - 75.0 mg/Hr 25.8 (L)       PMHx, PSHx: reviewed in epic. COPD.    Social Hx: no ETOH/tobacco use. Former smoker who quit two years ago. She smoked 1 ppd since she was 19.     Wt Readings from Last 6 Encounters:   02/12/25 60.1 kg (132 lb 7.9 oz)   01/13/25 60.2 kg (132 lb 13.2 oz)   07/23/24 61.6 kg (135 lb 12.8 oz)   07/10/24 61.6 kg (135 lb 12.9 oz)   05/06/24 62.6 kg (138 lb 0.1 oz)   01/24/24 65.5 kg (144 lb 6.4 oz)      ROS:   Constitutional: + wt loss 12 lbs  Eyes: No recent visual changes  Cardiovascular: Denies current anginal symptoms  Respiratory: Denies current respiratory difficulty  Gastrointestinal: Denies recent bowel disturbances  GenitoUrinary - No dysuria  Skin: No new skin rash  Neurologic: No focal neurologic complaints  Musculoskeletal: no joint pain  Endocrine: no polyphagia, polydipsia or polyuria  Remainder ROS negative     /84 (BP Location:  "Right arm, Patient Position: Sitting)   Pulse 72   Temp 97.8 °F (36.6 °C) (Oral)   Ht 5' 1" (1.549 m)   Wt 60.1 kg (132 lb 7.9 oz)   SpO2 (!) 94%   BMI 25.03 kg/m²      Personally reviewed labs below:    Lab Results   Component Value Date    TSH 0.982 06/25/2024    FREET4 0.89 06/25/2024        Chemistry        Component Value Date/Time     02/07/2025 0848     02/07/2025 0848    K 4.1 02/07/2025 0848    K 4.1 02/07/2025 0848     02/07/2025 0848     02/07/2025 0848    CO2 27 02/07/2025 0848    CO2 27 02/07/2025 0848    BUN 21 02/07/2025 0848    BUN 21 02/07/2025 0848    CREATININE 0.8 02/07/2025 0848    CREATININE 0.8 02/07/2025 0848     02/07/2025 0848     02/07/2025 0848        Component Value Date/Time    CALCIUM 9.1 02/07/2025 0848    CALCIUM 9.1 02/07/2025 0848    ALKPHOS 45 02/07/2025 0848    AST 22 02/07/2025 0848    ALT 25 02/07/2025 0848    BILITOT 0.3 02/07/2025 0848    ESTGFRAFRICA >60 05/09/2022 0525    EGFRNONAA >60 05/09/2022 0525         Lab Results   Component Value Date    HGBA1C 5.7 02/07/2025    HGBA1C 5.6 12/18/2023    HGBA1C 5.9 07/03/2023      US THYROID. A complete ultrasound examination of the thyroid was done with grayscale and color flow Doppler imaging where appropriate.     COMPARISON:  Most recent ultrasound from February 21, 2022     FINDINGS:  RIGHT lobe: The right lobe of the thyroid is normal in size and mildly heterogeneous in background echogenicity measuring 5.9 x 2.6 x 2.1 cm. The background vascularity of the thyroid gland is normal.     LEFT lobe: The left lobe of the thyroid is normal in size and mildly heterogeneous in background echogenicity measuring 4.7 x 2.4 x 2.3 cm. The background vascularity of the thyroid gland is normal.     ISTHMUS: The thyroid isthmus measures 2 mm.     LYMPH NODES: No gross lymphadenopathy is seen in the central neck.     THYROID NODULES:  NODULE #1: Right lower lobe  Size: 9 x 8 x 7 mm, previously 8 x 8 " x 7 mm  Composition: solid or almost completely solid (2 points)  Echogenicity: hyperechoic or isoechoic (1 point)  Shape: wider-than-tall (0 points)  Margin: ill-defined (0 points)  Echogenic Foci: none or large comet-tail artifacts (0 points)  Nodule TI-RADS score: TR3 (3 pts). less than 1.5 cm; no follow-up.     NODULE #2: Left mid lobe  Size: 19 x 15 x 15 mm, previously 19 x 15 x 15 mm  Composition: solid or almost completely solid (2 points)  Echogenicity: hyperechoic or isoechoic (1 point)  Shape: wider-than-tall (0 points)  Margin: smooth (0 points)  Echogenic Foci: none or large comet-tail artifacts (0 points)  Nodule TI-RADS score: TR3 (3 pts). Mildly suspicious. 1.5-2.5 cm follow up.     NODULE #3: Left lower lobe  Size: 14 x 13 x 9 mm, previously 14 x 11 x 9 mm  Composition: solid or almost completely solid (2 points)  Echogenicity: hyperechoic or isoechoic (1 point)  Shape: wider-than-tall (0 points)  Margin: ill-defined (0 points)  Echogenic Foci: none or large comet-tail artifacts (0 points)  Nodule TI-RADS score: TR3 (3 pts). less than 1.5 cm; no follow-up.     NODULE #4: Adjacent left lower lobe  Size: 11 x 8 x 8 mm, previously 10 x 7 x 6 mm  Composition: solid or almost completely solid (2 points)  Echogenicity: hyperechoic or isoechoic (1 point)  Shape: wider-than-tall (0 points)  Margin: ill-defined (0 points)  Echogenic Foci: none or large comet-tail artifacts (0 points)  Nodule TI-RADS score: TR3 (3 pts). less than 1.5 cm; no follow-up.     IMPRESSION:  Heterogeneous multinodular thyroid gland with index nodule as outlined above, without adverse interval change from the previous exam.    PE:  GENERAL: elderly female, well developed, well nourished  NECK: Supple neck, normal thyroid. No bruit  RESPIRATORY: Normal effort,   NEURO: steady gait, CN ll-Xll grossly intact  PSYCH: normal mood and affect    Assessment/Plan:     1. Multiple thyroid nodules  T4, Free    TSH    Comprehensive Metabolic  Panel      2. Postmenopausal        3. Hypovitaminosis D  Vitamin D      4. Hyperparathyroidism  PTH, Intact    Calcium, Ionized      5. Mixed hyperlipidemia  Lipid Panel        Nodular thyroid disease-stable-FNA benign. Repeat u/s in one year.  The 1.4 cm nodule was not identified during the FNA.   Postmenopausal-DEXA scan 2/26. Continue prolia q 6 mths. Will review dexa to determine tx or continue holiday. Continue ca and vd. Also, participate in weight bearing and resistance exercises for 40 min 4x weekly to maintain your bone density.   Hypovitaminosis t-kfgwad-iszmfugu vd intake.  Hyperparathyroidism-slightly elevated. Ca is wnl. Urine ca was low. Start 1000 mg of calcium daily.    FOLLOWUP    F/u in 6 mths w/ labs-PTH, ICA, CMP, VD, tfts, lp

## 2025-02-17 DIAGNOSIS — I25.10 CORONARY ARTERY DISEASE INVOLVING NATIVE CORONARY ARTERY OF NATIVE HEART WITHOUT ANGINA PECTORIS: ICD-10-CM

## 2025-02-17 DIAGNOSIS — I10 ESSENTIAL HYPERTENSION: ICD-10-CM

## 2025-02-17 DIAGNOSIS — E78.2 MIXED HYPERLIPIDEMIA: Primary | ICD-10-CM

## 2025-02-17 DIAGNOSIS — E78.5 HYPERLIPIDEMIA, UNSPECIFIED HYPERLIPIDEMIA TYPE: ICD-10-CM

## 2025-02-18 RX ORDER — EZETIMIBE 10 MG/1
10 TABLET ORAL DAILY
Qty: 90 TABLET | Refills: 2 | Status: SHIPPED | OUTPATIENT
Start: 2025-02-18

## 2025-02-18 RX ORDER — EZETIMIBE 10 MG/1
10 TABLET ORAL
Qty: 90 TABLET | Refills: 3 | Status: SHIPPED | OUTPATIENT
Start: 2025-02-18 | End: 2025-02-18 | Stop reason: SDUPTHER

## 2025-02-18 RX ORDER — VALSARTAN 160 MG/1
160 TABLET ORAL 2 TIMES DAILY
Qty: 180 TABLET | Refills: 3 | Status: SHIPPED | OUTPATIENT
Start: 2025-02-18 | End: 2025-02-18 | Stop reason: SDUPTHER

## 2025-02-18 RX ORDER — VALSARTAN 160 MG/1
160 TABLET ORAL 2 TIMES DAILY
Qty: 180 TABLET | Refills: 2 | Status: SHIPPED | OUTPATIENT
Start: 2025-02-18

## 2025-02-18 NOTE — TELEPHONE ENCOUNTER
No care due was identified.  [facility] Embedded Care Due Messages. Reference number: [id number].   2/17/2025 9:38:29 PM CST

## 2025-02-18 NOTE — TELEPHONE ENCOUNTER
Refill Decision Note   Gayathri Martina  is requesting a refill authorization.  Brief Assessment and Rationale for Refill:  Approve     Medication Therapy Plan:         Comments:     Note composed:9:08 AM 02/18/2025

## 2025-02-20 ENCOUNTER — INFUSION (OUTPATIENT)
Dept: INFUSION THERAPY | Facility: HOSPITAL | Age: 85
End: 2025-02-20
Attending: PHYSICIAN ASSISTANT
Payer: MEDICARE

## 2025-02-20 VITALS
WEIGHT: 135.13 LBS | HEIGHT: 61 IN | TEMPERATURE: 97 F | DIASTOLIC BLOOD PRESSURE: 83 MMHG | SYSTOLIC BLOOD PRESSURE: 145 MMHG | BODY MASS INDEX: 25.51 KG/M2 | RESPIRATION RATE: 18 BRPM | HEART RATE: 74 BPM

## 2025-02-20 DIAGNOSIS — M81.0 AGE-RELATED OSTEOPOROSIS WITHOUT CURRENT PATHOLOGICAL FRACTURE: Primary | ICD-10-CM

## 2025-02-20 PROCEDURE — 63600175 PHARM REV CODE 636 W HCPCS: Mod: JZ,TB | Performed by: PHYSICIAN ASSISTANT

## 2025-02-20 PROCEDURE — 96372 THER/PROPH/DIAG INJ SC/IM: CPT

## 2025-02-20 RX ADMIN — DENOSUMAB 60 MG: 60 INJECTION SUBCUTANEOUS at 12:02

## 2025-02-20 NOTE — PLAN OF CARE
Problem: Fatigue  Goal: Improved Activity Tolerance  2/20/2025 1156 by Ivon Cochran, RN  Outcome: Met  2/20/2025 1156 by Ivon Cochran, RN  Outcome: Progressing

## 2025-04-28 DIAGNOSIS — Z00.00 ENCOUNTER FOR MEDICARE ANNUAL WELLNESS EXAM: ICD-10-CM

## 2025-05-07 DIAGNOSIS — R45.82 WORRIES: ICD-10-CM

## 2025-05-07 DIAGNOSIS — F41.1 GAD (GENERALIZED ANXIETY DISORDER): ICD-10-CM

## 2025-05-07 DIAGNOSIS — K21.9 GASTROESOPHAGEAL REFLUX DISEASE WITHOUT ESOPHAGITIS: ICD-10-CM

## 2025-05-07 DIAGNOSIS — I10 ESSENTIAL HYPERTENSION: ICD-10-CM

## 2025-05-07 RX ORDER — ESCITALOPRAM OXALATE 10 MG/1
10 TABLET ORAL DAILY
Qty: 90 TABLET | Refills: 2 | Status: SHIPPED | OUTPATIENT
Start: 2025-05-07

## 2025-05-07 RX ORDER — ESOMEPRAZOLE MAGNESIUM 40 MG/1
40 CAPSULE, DELAYED RELEASE ORAL
Qty: 90 CAPSULE | Refills: 2 | Status: SHIPPED | OUTPATIENT
Start: 2025-05-07

## 2025-05-08 RX ORDER — METOPROLOL SUCCINATE 25 MG/1
25 TABLET, EXTENDED RELEASE ORAL DAILY
Qty: 90 TABLET | Refills: 2 | Status: SHIPPED | OUTPATIENT
Start: 2025-05-08

## 2025-05-08 NOTE — TELEPHONE ENCOUNTER
No care due was identified.  [facility] Embedded Care Due Messages. Reference number: [id number].   5/07/2025 9:30:17 PM CDT

## 2025-05-08 NOTE — TELEPHONE ENCOUNTER
Refill Routing Note   Medication(s) are not appropriate for processing by [facility] for the following reason(s):        Required vitals abnormal    [facility] action(s):  Defer  Approve             Appointments  past 12m or future 3m with PCP    Date Provider   Last Visit   1/13/2025 Carlos Deal MD   Next Visit   Visit date not found Carlos Deal MD   ED visits in past 90 days: 0        Note composed:10:23 PM 05/07/2025

## 2025-07-10 ENCOUNTER — LAB VISIT (OUTPATIENT)
Dept: LAB | Facility: HOSPITAL | Age: 85
End: 2025-07-10
Attending: INTERNAL MEDICINE
Payer: MEDICARE

## 2025-07-10 DIAGNOSIS — J44.9 VANISHING LUNG: ICD-10-CM

## 2025-07-10 DIAGNOSIS — I10 HYPERTENSION, UNSPECIFIED TYPE: Primary | ICD-10-CM

## 2025-07-10 DIAGNOSIS — E78.2 MIXED HYPERLIPIDEMIA: ICD-10-CM

## 2025-07-10 DIAGNOSIS — R01.1 UNDIAGNOSED CARDIAC MURMURS: ICD-10-CM

## 2025-07-10 LAB
ABSOLUTE EOSINOPHIL (SMH): 0.16 K/UL
ABSOLUTE MONOCYTE (SMH): 0.53 K/UL (ref 0.3–1)
ABSOLUTE NEUTROPHIL COUNT (SMH): 3.5 K/UL (ref 1.8–7.7)
ALBUMIN SERPL-MCNC: 3.8 G/DL (ref 3.5–5.2)
ALP SERPL-CCNC: 47 UNIT/L (ref 40–150)
ALT SERPL-CCNC: 17 UNIT/L (ref 10–44)
ANION GAP (SMH): 6 MMOL/L (ref 8–16)
AST SERPL-CCNC: 27 UNIT/L (ref 11–45)
BASOPHILS # BLD AUTO: 0.07 K/UL
BASOPHILS NFR BLD AUTO: 1.2 %
BILIRUB SERPL-MCNC: 0.3 MG/DL (ref 0.1–1)
BUN SERPL-MCNC: 20 MG/DL (ref 8–23)
CALCIUM SERPL-MCNC: 9.1 MG/DL (ref 8.7–10.5)
CHLORIDE SERPL-SCNC: 107 MMOL/L (ref 95–110)
CHOLEST SERPL-MCNC: 151 MG/DL (ref 120–199)
CHOLEST/HDLC SERPL: 2.6 {RATIO} (ref 2–5)
CO2 SERPL-SCNC: 24 MMOL/L (ref 23–29)
CREAT SERPL-MCNC: 0.6 MG/DL (ref 0.5–1.4)
EAG (SMH): 114 MG/DL (ref 68–131)
ERYTHROCYTE [DISTWIDTH] IN BLOOD BY AUTOMATED COUNT: 13.6 % (ref 11.5–14.5)
GFR SERPLBLD CREATININE-BSD FMLA CKD-EPI: >60 ML/MIN/1.73/M2
GLUCOSE SERPL-MCNC: 108 MG/DL (ref 70–110)
HBA1C MFR BLD: 5.6 % (ref 4–5.6)
HCT VFR BLD AUTO: 38.4 % (ref 37–48.5)
HDLC SERPL-MCNC: 58 MG/DL (ref 40–75)
HDLC SERPL: 38.4 % (ref 20–50)
HGB BLD-MCNC: 12.5 GM/DL (ref 12–16)
IMM GRANULOCYTES # BLD AUTO: 0.04 K/UL (ref 0–0.04)
IMM GRANULOCYTES NFR BLD AUTO: 0.7 % (ref 0–0.5)
LDLC SERPL CALC-MCNC: 80.8 MG/DL (ref 63–159)
LYMPHOCYTES # BLD AUTO: 1.53 K/UL (ref 1–4.8)
MCH RBC QN AUTO: 28.2 PG (ref 27–31)
MCHC RBC AUTO-ENTMCNC: 32.6 G/DL (ref 32–36)
MCV RBC AUTO: 87 FL (ref 82–98)
NONHDLC SERPL-MCNC: 93 MG/DL
NUCLEATED RBC (/100WBC) (SMH): 0 /100 WBC
PLATELET # BLD AUTO: 282 K/UL (ref 150–450)
PMV BLD AUTO: 10.4 FL (ref 9.2–12.9)
POTASSIUM SERPL-SCNC: 3.9 MMOL/L (ref 3.5–5.1)
PROT SERPL-MCNC: 6.4 GM/DL (ref 6–8.4)
RBC # BLD AUTO: 4.43 M/UL (ref 4–5.4)
RELATIVE EOSINOPHIL (SMH): 2.7 % (ref 0–8)
RELATIVE LYMPHOCYTE (SMH): 26.3 % (ref 18–48)
RELATIVE MONOCYTE (SMH): 9.1 % (ref 4–15)
RELATIVE NEUTROPHIL (SMH): 60 % (ref 38–73)
SODIUM SERPL-SCNC: 137 MMOL/L (ref 136–145)
TRIGL SERPL-MCNC: 61 MG/DL (ref 30–150)
TSH SERPL-ACNC: 1.11 UIU/ML (ref 0.4–4)
WBC # BLD AUTO: 5.82 K/UL (ref 3.9–12.7)

## 2025-07-10 PROCEDURE — 83036 HEMOGLOBIN GLYCOSYLATED A1C: CPT

## 2025-07-10 PROCEDURE — 82465 ASSAY BLD/SERUM CHOLESTEROL: CPT

## 2025-07-10 PROCEDURE — 82565 ASSAY OF CREATININE: CPT

## 2025-07-10 PROCEDURE — 84443 ASSAY THYROID STIM HORMONE: CPT

## 2025-07-10 PROCEDURE — 36415 COLL VENOUS BLD VENIPUNCTURE: CPT

## 2025-07-10 PROCEDURE — 85025 COMPLETE CBC W/AUTO DIFF WBC: CPT

## 2025-07-23 DIAGNOSIS — M25.511 RIGHT SHOULDER PAIN, UNSPECIFIED CHRONICITY: Primary | ICD-10-CM

## 2025-07-24 ENCOUNTER — OFFICE VISIT (OUTPATIENT)
Dept: ORTHOPEDICS | Facility: CLINIC | Age: 85
End: 2025-07-24
Payer: MEDICARE

## 2025-07-24 ENCOUNTER — HOSPITAL ENCOUNTER (OUTPATIENT)
Dept: RADIOLOGY | Facility: HOSPITAL | Age: 85
Discharge: HOME | End: 2025-07-24
Attending: ORTHOPAEDIC SURGERY
Payer: MEDICARE

## 2025-07-24 VITALS — RESPIRATION RATE: 16 BRPM

## 2025-07-24 DIAGNOSIS — S46.012A TRAUMATIC TEAR OF LEFT ROTATOR CUFF, UNSPECIFIED TEAR EXTENT, INITIAL ENCOUNTER: Primary | ICD-10-CM

## 2025-07-24 DIAGNOSIS — M75.102 TEAR OF LEFT ROTATOR CUFF, UNSPECIFIED TEAR EXTENT, UNSPECIFIED WHETHER TRAUMATIC: Primary | ICD-10-CM

## 2025-07-24 DIAGNOSIS — M25.511 RIGHT SHOULDER PAIN, UNSPECIFIED CHRONICITY: ICD-10-CM

## 2025-07-24 PROCEDURE — 99204 OFFICE O/P NEW MOD 45 MIN: CPT | Mod: S$PBB,,, | Performed by: ORTHOPAEDIC SURGERY

## 2025-07-24 PROCEDURE — 73030 X-RAY EXAM OF SHOULDER: CPT | Mod: 26,LT,, | Performed by: RADIOLOGY

## 2025-07-24 PROCEDURE — 73030 X-RAY EXAM OF SHOULDER: CPT | Mod: TC,PO,LT

## 2025-07-24 PROCEDURE — 99212 OFFICE O/P EST SF 10 MIN: CPT | Mod: PBBFAC,25,PO | Performed by: ORTHOPAEDIC SURGERY

## 2025-07-24 PROCEDURE — 99999 PR PBB SHADOW E&M-EST. PATIENT-LVL II: CPT | Mod: PBBFAC,,, | Performed by: ORTHOPAEDIC SURGERY

## 2025-07-24 NOTE — PROGRESS NOTES
Past Medical History:   Diagnosis Date    Alopecia     wear wigs     COPD (chronic obstructive pulmonary disease)     COVID-19 virus infection     1/2023    Hearing loss     bilateral     Hx of rickets     Hypertension     Osteoporosis     Skin cancer, basal cell        Past Surgical History:   Procedure Laterality Date    CATARACT EXTRACTION      bliteral    IMPLANTATION OF COCHLEAR PROSTHESIS Left 2007       Current Outpatient Medications   Medication Sig    albuterol (VENTOLIN HFA) 90 mcg/actuation inhaler Inhale 2 puffs into the lungs every 4 (four) hours as needed for Wheezing or Shortness of Breath. Rescue    albuterol-ipratropium (DUO-NEB) 2.5 mg-0.5 mg/3 mL nebulizer solution Take 3 mLs by nebulization every 6 (six) hours as needed for Wheezing. Rescue    diclofenac sodium (SOLARAZE) 3 % gel Apply topically 2 (two) times daily as needed (hand pain).    EScitalopram oxalate (LEXAPRO) 10 MG tablet Take 1 tablet (10 mg total) by mouth once daily. For anxiety.    esomeprazole (NEXIUM) 40 MG capsule Take 1 capsule (40 mg total) by mouth before breakfast.    ezetimibe (ZETIA) 10 mg tablet Take 1 tablet (10 mg total) by mouth once daily.    fluticasone-umeclidin-vilanter (TRELEGY ELLIPTA) 200-62.5-25 mcg inhaler Inhale 1 puff into the lungs once daily. Wants one monthly only    metoprolol succinate (TOPROL-XL) 25 MG 24 hr tablet Take 1 tablet (25 mg total) by mouth once daily.    montelukast (SINGULAIR) 10 mg tablet Take 1 tablet (10 mg total) by mouth once daily.    rosuvastatin (CRESTOR) 5 MG tablet TAKE 1 TABLET BY MOUTH ONCE  DAILY    valsartan (DIOVAN) 160 MG tablet Take 1 tablet (160 mg total) by mouth 2 (two) times daily.     No current facility-administered medications for this visit.       Review of patient's allergies indicates:  No Known Allergies    No family history on file.    Social History[1]    Chief Complaint: No chief complaint on file.      History of present illness:  85-year-old left hand  dominant female seen after a fall for left shoulder pain.  Patient tripped on an uneven part of the ground and landed straight ahead.  Has been unable to raise her arm up since then.  Date of injury was in mid June.  Pain is 10/10.  Hard to sleep at night    Prior treatment:  None        Review of Systems:    Constitution: Negative for chills, fever, and sweats.  Negative for unexplained weight loss.    HENT:  Negative for headaches and blurry vision.    Cardiovascular:Negative for chest pain or irregular heart beat. Negative for hypertension.    Respiratory:  Negative for cough and shortness of breath.    Gastrointestinal: Negative for abdominal pain, heartburn, melena, nausea, and vomitting.    Genitourinary:  Negative bladder incontinence and dysuria.    Musculoskeletal:  See HPI    Neurological: Negative for numbness.    Psychiatric/Behavioral: Negative for depression.  The patient is not nervous/anxious.      Endocrine: Negative for polyuria    Hematologic/Lymphatic: Negative for bleeding problem.  Does not bruise/bleed easily.    Skin: Negative for poor would healing and rash      Physical Examination:    Vital Signs:  There were no vitals filed for this visit.    There is no height or weight on file to calculate BMI.    This a well-developed, well nourished patient in no acute distress.  They are alert and oriented and cooperative to examination.  Pt. walks without an antalgic gait.      Examination of the left shoulder shows no rashes or erythema. There are no masses, ecchymosis, or atrophy. The patient has decreased active range of motion in forward flexion, external rotation, and internal rotation to the mid T-spine. The patient has positive Hernández and Neer test- Woodruff's test. - Speeds test. Nontender to palpation over a.c. joint. Normal stability anteriorly, posteriorly, and negative sulcus sign. Passive range of motion: Forward flexion of 180°, external rotation at 90° of 90°, internal rotation of  50°, and external rotation at 0° of 50°. 2+ radial pulse. Intact axillary, radial, median and ulnar sensation. 4 out of 5 resisted forward flexion, external rotation, and negative lift off test.      X-rays:  X-rays left shoulder ordered and review which show no bony abnormality.         Assessment:  Left traumatic rotator cuff tear    Plan:  I reviewed the findings with her today.  Recommended an MRI to evaluate for traumatic rotator cuff tear.  Patient does have a cochlear implant and might not be able to get an MRI.  Would get a CT arthrogram instead if that is the case.            All previous pertinent notes including ER visits, physical therapy visits, other orthopedic visits as well as other care for the same musculoskeletal problem were reviewed.  All pertinent lab values and previous imaging was reviewed pertinent to the current visit.    This note was created using [employer] voice recognition software that occasionally misinterpreted phrases or words.    Consult note is delivered via Epic messaging service.           [1]   Social History  Socioeconomic History    Marital status: Single   Tobacco Use    Smoking status: Former     Current packs/day: 0.00     Types: Cigarettes     Quit date:      Years since quittin.5     Passive exposure: Past    Smokeless tobacco: Never    Tobacco comments:     8 months ago   Substance and Sexual Activity    Alcohol use: No   Social History Narrative    Living with daughter      Social Drivers of Health     Financial Resource Strain: Medium Risk (2025)    Overall Financial Resource Strain (CARDIA)     Difficulty of Paying Living Expenses: Somewhat hard   Food Insecurity: Food Insecurity Present (2025)    Hunger Vital Sign     Worried About Running Out of Food in the Last Year: Never true     Ran Out of Food in the Last Year: Sometimes true   Physical Activity: Sufficiently Active (2025)    Exercise Vital Sign     Days of Exercise per Week: 5 days      Minutes of Exercise per Session: 50 min   Stress: No Stress Concern Present (2/5/2025)    [employer] - Occupational Stress Questionnaire     Feeling of Stress : Not at all   Housing Stability: Unknown (2/5/2025)    Housing Stability Vital Sign     Unable to Pay for Housing in the Last Year: No

## 2025-07-25 ENCOUNTER — TELEPHONE (OUTPATIENT)
Dept: ORTHOPEDICS | Facility: CLINIC | Age: 85
End: 2025-07-25
Payer: MEDICARE

## 2025-07-25 NOTE — TELEPHONE ENCOUNTER
----- Message from Cesario Anne sent at 7/25/2025 12:48 PM CDT -----  Contact: teodoro  Pt has coclear implant, Teodoro does not want to wait for MRI state radiologist said other testing would work   Call back   ----- Message -----  From: Dayana Cornelius MA  Sent: 7/25/2025  12:49 PM CDT  To: [facility] Staff    Pt has coclear implant, should not schedule MRI   Call back

## 2025-07-28 DIAGNOSIS — M75.102 TEAR OF LEFT ROTATOR CUFF, UNSPECIFIED TEAR EXTENT, UNSPECIFIED WHETHER TRAUMATIC: Primary | ICD-10-CM

## 2025-07-28 RX ORDER — TRAMADOL HYDROCHLORIDE 50 MG/1
50 TABLET, FILM COATED ORAL EVERY 6 HOURS PRN
Qty: 28 TABLET | Refills: 0 | Status: SHIPPED | OUTPATIENT
Start: 2025-07-28

## 2025-08-06 ENCOUNTER — LAB VISIT (OUTPATIENT)
Dept: LAB | Facility: HOSPITAL | Age: 85
End: 2025-08-06
Attending: PHYSICIAN ASSISTANT
Payer: MEDICARE

## 2025-08-06 ENCOUNTER — HOSPITAL ENCOUNTER (OUTPATIENT)
Dept: RADIOLOGY | Facility: HOSPITAL | Age: 85
Discharge: HOME | End: 2025-08-06
Attending: ORTHOPAEDIC SURGERY
Payer: MEDICARE

## 2025-08-06 DIAGNOSIS — E55.9 HYPOVITAMINOSIS D: ICD-10-CM

## 2025-08-06 DIAGNOSIS — E78.2 MIXED HYPERLIPIDEMIA: ICD-10-CM

## 2025-08-06 DIAGNOSIS — E04.2 MULTIPLE THYROID NODULES: ICD-10-CM

## 2025-08-06 DIAGNOSIS — E21.3 HYPERPARATHYROIDISM: ICD-10-CM

## 2025-08-06 DIAGNOSIS — M75.102 TEAR OF LEFT ROTATOR CUFF, UNSPECIFIED TEAR EXTENT, UNSPECIFIED WHETHER TRAUMATIC: ICD-10-CM

## 2025-08-06 LAB
25(OH)D3+25(OH)D2 SERPL-MCNC: 42 NG/ML (ref 30–96)
ALBUMIN SERPL BCP-MCNC: 3.8 G/DL (ref 3.5–5.2)
ALP SERPL-CCNC: 41 UNIT/L (ref 40–150)
ALT SERPL W/O P-5'-P-CCNC: 21 UNIT/L (ref 0–55)
ANION GAP (OHS): 10 MMOL/L (ref 8–16)
AST SERPL-CCNC: 31 UNIT/L (ref 0–50)
BILIRUB SERPL-MCNC: 0.2 MG/DL (ref 0.1–1)
BUN SERPL-MCNC: 23 MG/DL (ref 8–23)
CA-I BLD-SCNC: 1.21 MMOL/L (ref 1.06–1.42)
CALCIUM SERPL-MCNC: 9.1 MG/DL (ref 8.7–10.5)
CHLORIDE SERPL-SCNC: 106 MMOL/L (ref 95–110)
CHOLEST SERPL-MCNC: 160 MG/DL (ref 120–199)
CHOLEST/HDLC SERPL: 2.6 {RATIO} (ref 2–5)
CO2 SERPL-SCNC: 25 MMOL/L (ref 23–29)
CREAT SERPL-MCNC: 0.9 MG/DL (ref 0.5–1.4)
GFR SERPLBLD CREATININE-BSD FMLA CKD-EPI: >60 ML/MIN/1.73/M2
GLUCOSE SERPL-MCNC: 108 MG/DL (ref 70–110)
HDLC SERPL-MCNC: 61 MG/DL (ref 40–75)
HDLC SERPL: 38.1 % (ref 20–50)
LDLC SERPL CALC-MCNC: 86 MG/DL (ref 63–159)
NONHDLC SERPL-MCNC: 99 MG/DL
POTASSIUM SERPL-SCNC: 4.8 MMOL/L (ref 3.5–5.1)
PROT SERPL-MCNC: 6.5 GM/DL (ref 6–8.4)
PTH-INTACT SERPL-MCNC: 85.7 PG/ML (ref 9–77)
SODIUM SERPL-SCNC: 141 MMOL/L (ref 136–145)
T4 FREE SERPL-MCNC: 1.02 NG/DL (ref 0.71–1.51)
TRIGL SERPL-MCNC: 65 MG/DL (ref 30–150)
TSH SERPL-ACNC: 1.21 UIU/ML (ref 0.4–4)

## 2025-08-06 PROCEDURE — 83970 ASSAY OF PARATHORMONE: CPT

## 2025-08-06 PROCEDURE — 73040 CONTRAST X-RAY OF SHOULDER: CPT | Mod: TC,LT

## 2025-08-06 PROCEDURE — 84439 ASSAY OF FREE THYROXINE: CPT

## 2025-08-06 PROCEDURE — 82330 ASSAY OF CALCIUM: CPT

## 2025-08-06 PROCEDURE — 25500020 PHARM REV CODE 255

## 2025-08-06 PROCEDURE — 73201 CT UPPER EXTREMITY W/DYE: CPT | Mod: 26,LT,, | Performed by: RADIOLOGY

## 2025-08-06 PROCEDURE — 36415 COLL VENOUS BLD VENIPUNCTURE: CPT | Mod: PO

## 2025-08-06 PROCEDURE — 73201 CT UPPER EXTREMITY W/DYE: CPT | Mod: TC,LT

## 2025-08-06 PROCEDURE — 80061 LIPID PANEL: CPT

## 2025-08-06 PROCEDURE — 80053 COMPREHEN METABOLIC PANEL: CPT

## 2025-08-06 PROCEDURE — 84443 ASSAY THYROID STIM HORMONE: CPT

## 2025-08-06 PROCEDURE — 82306 VITAMIN D 25 HYDROXY: CPT

## 2025-08-06 RX ORDER — LIDOCAINE HYDROCHLORIDE 10 MG/ML
INJECTION, SOLUTION EPIDURAL; INFILTRATION; INTRACAUDAL; PERINEURAL
Status: DISCONTINUED
Start: 2025-08-06 | End: 2025-08-06 | Stop reason: WASHOUT

## 2025-08-06 RX ADMIN — IOHEXOL 9 ML: 350 INJECTION, SOLUTION INTRAVENOUS at 03:08

## 2025-08-13 ENCOUNTER — OFFICE VISIT (OUTPATIENT)
Dept: ENDOCRINOLOGY | Facility: CLINIC | Age: 85
End: 2025-08-13
Payer: MEDICARE

## 2025-08-13 VITALS
BODY MASS INDEX: 25.1 KG/M2 | HEART RATE: 72 BPM | WEIGHT: 132.94 LBS | HEIGHT: 61 IN | OXYGEN SATURATION: 95 % | SYSTOLIC BLOOD PRESSURE: 140 MMHG | TEMPERATURE: 98 F | DIASTOLIC BLOOD PRESSURE: 86 MMHG

## 2025-08-13 DIAGNOSIS — E04.2 MULTIPLE THYROID NODULES: Primary | ICD-10-CM

## 2025-08-13 DIAGNOSIS — Z78.0 POSTMENOPAUSAL: ICD-10-CM

## 2025-08-13 DIAGNOSIS — E21.3 HYPERPARATHYROIDISM: ICD-10-CM

## 2025-08-13 DIAGNOSIS — E55.9 HYPOVITAMINOSIS D: ICD-10-CM

## 2025-08-13 PROCEDURE — G2211 COMPLEX E/M VISIT ADD ON: HCPCS | Mod: ,,, | Performed by: PHYSICIAN ASSISTANT

## 2025-08-13 PROCEDURE — 99214 OFFICE O/P EST MOD 30 MIN: CPT | Mod: PBBFAC,PO | Performed by: PHYSICIAN ASSISTANT

## 2025-08-13 PROCEDURE — 99213 OFFICE O/P EST LOW 20 MIN: CPT | Mod: S$PBB,,, | Performed by: PHYSICIAN ASSISTANT

## 2025-08-13 PROCEDURE — 99999 PR PBB SHADOW E&M-EST. PATIENT-LVL IV: CPT | Mod: PBBFAC,,, | Performed by: PHYSICIAN ASSISTANT

## 2025-08-14 ENCOUNTER — OFFICE VISIT (OUTPATIENT)
Dept: ORTHOPEDICS | Facility: CLINIC | Age: 85
End: 2025-08-14
Payer: MEDICARE

## 2025-08-14 VITALS — HEIGHT: 61 IN | WEIGHT: 132.94 LBS | BODY MASS INDEX: 25.1 KG/M2

## 2025-08-14 DIAGNOSIS — M19.012 PRIMARY OSTEOARTHRITIS OF LEFT SHOULDER: Primary | ICD-10-CM

## 2025-08-14 DIAGNOSIS — M75.102 TEAR OF LEFT ROTATOR CUFF, UNSPECIFIED TEAR EXTENT, UNSPECIFIED WHETHER TRAUMATIC: ICD-10-CM

## 2025-08-14 PROCEDURE — 99212 OFFICE O/P EST SF 10 MIN: CPT | Mod: PBBFAC,PO | Performed by: ORTHOPAEDIC SURGERY

## 2025-08-14 PROCEDURE — 99214 OFFICE O/P EST MOD 30 MIN: CPT | Mod: 25,S$PBB,, | Performed by: ORTHOPAEDIC SURGERY

## 2025-08-14 PROCEDURE — 99999PBSHW PR PBB SHADOW TECHNICAL ONLY FILED TO HB: Mod: PBBFAC,,,

## 2025-08-14 PROCEDURE — 20610 DRAIN/INJ JOINT/BURSA W/O US: CPT | Mod: PBBFAC,PO,LT | Performed by: ORTHOPAEDIC SURGERY

## 2025-08-14 PROCEDURE — 99999 PR PBB SHADOW E&M-EST. PATIENT-LVL II: CPT | Mod: PBBFAC,,, | Performed by: ORTHOPAEDIC SURGERY

## 2025-08-14 RX ORDER — TRIAMCINOLONE ACETONIDE 40 MG/ML
40 INJECTION, SUSPENSION INTRA-ARTICULAR; INTRAMUSCULAR
Status: DISCONTINUED | OUTPATIENT
Start: 2025-08-14 | End: 2025-08-14 | Stop reason: HOSPADM

## 2025-08-14 RX ADMIN — TRIAMCINOLONE ACETONIDE 40 MG: 40 INJECTION, SUSPENSION INTRA-ARTICULAR; INTRAMUSCULAR at 02:08

## 2025-08-19 ENCOUNTER — CLINICAL SUPPORT (OUTPATIENT)
Dept: REHABILITATION | Facility: HOSPITAL | Age: 85
End: 2025-08-19
Attending: ORTHOPAEDIC SURGERY
Payer: MEDICARE

## 2025-08-19 DIAGNOSIS — M25.512 PAIN IN JOINT OF LEFT SHOULDER: Primary | ICD-10-CM

## 2025-08-19 DIAGNOSIS — M25.612 LIMITATION OF JOINT MOTION OF SHOULDER, LEFT: ICD-10-CM

## 2025-08-19 PROCEDURE — 97161 PT EVAL LOW COMPLEX 20 MIN: CPT | Mod: PO

## 2025-08-19 PROCEDURE — 97110 THERAPEUTIC EXERCISES: CPT | Mod: PO

## 2025-08-21 ENCOUNTER — INFUSION (OUTPATIENT)
Dept: INFUSION THERAPY | Facility: HOSPITAL | Age: 85
End: 2025-08-21
Attending: PHYSICIAN ASSISTANT
Payer: MEDICARE

## 2025-08-21 VITALS
HEIGHT: 61 IN | RESPIRATION RATE: 18 BRPM | WEIGHT: 132.5 LBS | BODY MASS INDEX: 25.02 KG/M2 | SYSTOLIC BLOOD PRESSURE: 109 MMHG | HEART RATE: 92 BPM | DIASTOLIC BLOOD PRESSURE: 75 MMHG | OXYGEN SATURATION: 97 % | TEMPERATURE: 96 F

## 2025-08-21 DIAGNOSIS — M81.0 AGE-RELATED OSTEOPOROSIS WITHOUT CURRENT PATHOLOGICAL FRACTURE: Primary | ICD-10-CM

## 2025-08-21 PROCEDURE — 96372 THER/PROPH/DIAG INJ SC/IM: CPT

## 2025-08-21 PROCEDURE — 63600175 PHARM REV CODE 636 W HCPCS: Mod: JZ,TB | Performed by: PHYSICIAN ASSISTANT

## 2025-08-21 RX ADMIN — DENOSUMAB 60 MG: 60 INJECTION SUBCUTANEOUS at 11:08

## 2025-08-27 ENCOUNTER — CLINICAL SUPPORT (OUTPATIENT)
Dept: REHABILITATION | Facility: HOSPITAL | Age: 85
End: 2025-08-27
Attending: ORTHOPAEDIC SURGERY
Payer: MEDICARE

## 2025-08-27 DIAGNOSIS — M25.612 LIMITATION OF JOINT MOTION OF SHOULDER, LEFT: ICD-10-CM

## 2025-08-27 DIAGNOSIS — M25.512 PAIN IN JOINT OF LEFT SHOULDER: Primary | ICD-10-CM

## 2025-08-27 PROCEDURE — 97110 THERAPEUTIC EXERCISES: CPT | Mod: PO

## 2025-09-03 ENCOUNTER — CLINICAL SUPPORT (OUTPATIENT)
Dept: REHABILITATION | Facility: HOSPITAL | Age: 85
End: 2025-09-03
Attending: ORTHOPAEDIC SURGERY
Payer: MEDICARE

## 2025-09-03 DIAGNOSIS — M25.512 PAIN IN JOINT OF LEFT SHOULDER: Primary | ICD-10-CM

## 2025-09-03 DIAGNOSIS — M25.612 LIMITATION OF JOINT MOTION OF SHOULDER, LEFT: ICD-10-CM

## 2025-09-03 PROCEDURE — 97110 THERAPEUTIC EXERCISES: CPT | Mod: PO
